# Patient Record
Sex: MALE | Race: WHITE | NOT HISPANIC OR LATINO | Employment: OTHER | URBAN - METROPOLITAN AREA
[De-identification: names, ages, dates, MRNs, and addresses within clinical notes are randomized per-mention and may not be internally consistent; named-entity substitution may affect disease eponyms.]

---

## 2017-07-05 ENCOUNTER — TRANSCRIBE ORDERS (OUTPATIENT)
Dept: ADMINISTRATIVE | Facility: HOSPITAL | Age: 82
End: 2017-07-05

## 2017-07-05 DIAGNOSIS — R53.1 WEAKNESS: Primary | ICD-10-CM

## 2017-07-13 ENCOUNTER — HOSPITAL ENCOUNTER (OUTPATIENT)
Dept: MRI IMAGING | Facility: HOSPITAL | Age: 82
Discharge: HOME | End: 2017-07-13
Attending: INTERNAL MEDICINE
Payer: COMMERCIAL

## 2017-07-13 DIAGNOSIS — R53.1 WEAKNESS: ICD-10-CM

## 2017-07-13 PROCEDURE — 70551 MRI BRAIN STEM W/O DYE: CPT

## 2018-09-24 ENCOUNTER — HOSPITAL ENCOUNTER (EMERGENCY)
Facility: HOSPITAL | Age: 83
Discharge: HOME | End: 2018-09-24
Attending: EMERGENCY MEDICINE | Admitting: EMERGENCY MEDICINE
Payer: COMMERCIAL

## 2018-09-24 VITALS
DIASTOLIC BLOOD PRESSURE: 91 MMHG | SYSTOLIC BLOOD PRESSURE: 169 MMHG | RESPIRATION RATE: 18 BRPM | HEART RATE: 73 BPM | TEMPERATURE: 97.7 F | WEIGHT: 210.87 LBS | OXYGEN SATURATION: 97 %

## 2018-09-24 DIAGNOSIS — R33.9 URINARY RETENTION: Primary | ICD-10-CM

## 2018-09-24 LAB
ANION GAP SERPL CALCULATED.3IONS-SCNC: 7 MMOL/L (ref 4–13)
BASOPHILS # BLD AUTO: 0.04 THOUSANDS/ΜL (ref 0–0.1)
BASOPHILS NFR BLD AUTO: 1 % (ref 0–1)
BILIRUB UR QL STRIP: NEGATIVE
BUN SERPL-MCNC: 22 MG/DL (ref 5–25)
CALCIUM SERPL-MCNC: 8.1 MG/DL (ref 8.3–10.1)
CHLORIDE SERPL-SCNC: 106 MMOL/L (ref 100–108)
CLARITY UR: CLEAR
CO2 SERPL-SCNC: 26 MMOL/L (ref 21–32)
COLOR UR: YELLOW
CREAT SERPL-MCNC: 1.26 MG/DL (ref 0.6–1.3)
EOSINOPHIL # BLD AUTO: 0.07 THOUSAND/ΜL (ref 0–0.61)
EOSINOPHIL NFR BLD AUTO: 2 % (ref 0–6)
ERYTHROCYTE [DISTWIDTH] IN BLOOD BY AUTOMATED COUNT: 12.5 % (ref 11.6–15.1)
GFR SERPL CREATININE-BSD FRML MDRD: 49 ML/MIN/1.73SQ M
GLUCOSE SERPL-MCNC: 96 MG/DL (ref 65–140)
GLUCOSE UR STRIP-MCNC: NEGATIVE MG/DL
HCT VFR BLD AUTO: 41.8 % (ref 36.5–49.3)
HGB BLD-MCNC: 14.1 G/DL (ref 12–17)
HGB UR QL STRIP.AUTO: NEGATIVE
IMM GRANULOCYTES # BLD AUTO: 0.02 THOUSAND/UL (ref 0–0.2)
IMM GRANULOCYTES NFR BLD AUTO: 0 % (ref 0–2)
KETONES UR STRIP-MCNC: NEGATIVE MG/DL
LEUKOCYTE ESTERASE UR QL STRIP: NEGATIVE
LYMPHOCYTES # BLD AUTO: 1.2 THOUSANDS/ΜL (ref 0.6–4.47)
LYMPHOCYTES NFR BLD AUTO: 27 % (ref 14–44)
MCH RBC QN AUTO: 30.6 PG (ref 26.8–34.3)
MCHC RBC AUTO-ENTMCNC: 33.7 G/DL (ref 31.4–37.4)
MCV RBC AUTO: 91 FL (ref 82–98)
MONOCYTES # BLD AUTO: 0.41 THOUSAND/ΜL (ref 0.17–1.22)
MONOCYTES NFR BLD AUTO: 9 % (ref 4–12)
NEUTROPHILS # BLD AUTO: 2.79 THOUSANDS/ΜL (ref 1.85–7.62)
NEUTS SEG NFR BLD AUTO: 61 % (ref 43–75)
NITRITE UR QL STRIP: NEGATIVE
NRBC BLD AUTO-RTO: 0 /100 WBCS
PH UR STRIP.AUTO: 5.5 [PH] (ref 4.5–8)
PLATELET # BLD AUTO: 148 THOUSANDS/UL (ref 149–390)
PMV BLD AUTO: 11.3 FL (ref 8.9–12.7)
POTASSIUM SERPL-SCNC: 3.9 MMOL/L (ref 3.5–5.3)
PROT UR STRIP-MCNC: NEGATIVE MG/DL
RBC # BLD AUTO: 4.61 MILLION/UL (ref 3.88–5.62)
SODIUM SERPL-SCNC: 139 MMOL/L (ref 136–145)
SP GR UR STRIP.AUTO: 1.01 (ref 1–1.03)
UROBILINOGEN UR QL STRIP.AUTO: 0.2 E.U./DL
WBC # BLD AUTO: 4.53 THOUSAND/UL (ref 4.31–10.16)

## 2018-09-24 PROCEDURE — 36415 COLL VENOUS BLD VENIPUNCTURE: CPT | Performed by: EMERGENCY MEDICINE

## 2018-09-24 PROCEDURE — 80048 BASIC METABOLIC PNL TOTAL CA: CPT | Performed by: EMERGENCY MEDICINE

## 2018-09-24 PROCEDURE — 81003 URINALYSIS AUTO W/O SCOPE: CPT

## 2018-09-24 PROCEDURE — 99283 EMERGENCY DEPT VISIT LOW MDM: CPT

## 2018-09-24 PROCEDURE — 85025 COMPLETE CBC W/AUTO DIFF WBC: CPT | Performed by: EMERGENCY MEDICINE

## 2018-09-24 RX ORDER — TAMSULOSIN HYDROCHLORIDE 0.4 MG/1
0.4 CAPSULE ORAL
COMMUNITY
Start: 2017-10-12

## 2018-09-24 NOTE — ED PROVIDER NOTES
History  Chief Complaint   Patient presents with    Urinary Retention     pt here with c/o urinary retention and pain since this morning  hx of blockage     77-year-old male brought in for evaluation with chief complaint of inability to urinate  Onset was this morning  Patient has significant urgency and suprapubic abdominal pain  Patient has tried to urinate but is only able to pass a few drops of urine  No fevers or chills  No dysuria  No hematuria  Patient has a history of similar symptoms in the past requiring a Ruelas catheter  According to family members however this was a traumatic experience for him and several times he accidentally dislodged the catheter himself  History provided by:  Patient and relative   used: No    Difficulty Urinating   Presenting symptoms: no dysuria    Presenting symptoms comment:  Urinary retention    Relieved by:  Nothing  Worsened by:  Nothing  Ineffective treatments:  None tried  Associated symptoms: abdominal pain (suprapubic) and urinary retention    Associated symptoms: no diarrhea, no fever, no hematuria, no nausea, no urinary frequency, no urinary incontinence and no vomiting    Abdominal pain:     Location:  Suprapubic    Quality: aching and pressure      Severity:  Moderate    Onset quality:  Sudden    Duration:  1 day    Timing:  Constant    Progression:  Worsening    Chronicity:  New  Risk factors: no change in medication        Prior to Admission Medications   Prescriptions Last Dose Informant Patient Reported? Taking?   tamsulosin (FLOMAX) 0 4 mg   Yes Yes   Sig: Take 0 8 mg by mouth      Facility-Administered Medications: None       Past Medical History:   Diagnosis Date    Gall stone     Hypotension     Urinary retention        History reviewed  No pertinent surgical history  History reviewed  No pertinent family history  I have reviewed and agree with the history as documented      Social History   Substance Use Topics    Smoking status: Never Smoker    Smokeless tobacco: Never Used    Alcohol use No        Review of Systems   Constitutional: Negative for chills, diaphoresis and fever  Respiratory: Negative for shortness of breath  Cardiovascular: Negative for chest pain and palpitations  Gastrointestinal: Positive for abdominal pain (suprapubic)  Negative for diarrhea, nausea and vomiting  Genitourinary: Positive for decreased urine volume, difficulty urinating and urgency  Negative for bladder incontinence, dysuria, frequency and hematuria  Skin: Negative for rash  All other systems reviewed and are negative  Physical Exam  Physical Exam   Constitutional: He is oriented to person, place, and time  He appears well-developed and well-nourished  He appears distressed  HENT:   Head: Normocephalic and atraumatic  Eyes: EOM are normal  Pupils are equal, round, and reactive to light  Neck: Normal range of motion  No JVD present  Cardiovascular: Normal rate, regular rhythm, normal heart sounds and intact distal pulses  Exam reveals no gallop and no friction rub  No murmur heard  Pulmonary/Chest: Effort normal and breath sounds normal  No respiratory distress  He has no wheezes  He has no rales  He exhibits no tenderness  Abdominal: There is tenderness (suprapubic tenderness over bladder)  Musculoskeletal: Normal range of motion  He exhibits no tenderness  Neurological: He is alert and oriented to person, place, and time  Hard of hearing     Skin: Skin is warm and dry  Psychiatric: He has a normal mood and affect  His behavior is normal  Judgment and thought content normal    Nursing note and vitals reviewed        Vital Signs  ED Triage Vitals [09/24/18 0748]   Temperature Pulse Respirations Blood Pressure SpO2   97 7 °F (36 5 °C) 73 18 169/91 97 %      Temp Source Heart Rate Source Patient Position - Orthostatic VS BP Location FiO2 (%)   Oral Monitor Sitting Right arm --      Pain Score       -- Vitals:    09/24/18 0748   BP: 169/91   Pulse: 73   Patient Position - Orthostatic VS: Sitting       Visual Acuity      ED Medications  Medications - No data to display    Diagnostic Studies  Results Reviewed     Procedure Component Value Units Date/Time    Basic metabolic panel [[id number]]  (Abnormal) Collected:  09/24/18 0819    Lab Status:  Final result Specimen:  Blood from Arm, Right Updated:  09/24/18 0849     Sodium 139 mmol/L      Potassium 3 9 mmol/L      Chloride 106 mmol/L      CO2 26 mmol/L      ANION GAP 7 mmol/L      BUN 22 mg/dL      Creatinine 1 26 mg/dL      Glucose 96 mg/dL      Calcium 8 1 (L) mg/dL      eGFR 49 ml/min/1 73sq m     Narrative:         National Kidney Disease Education Program recommendations are as follows:  GFR calculation is accurate only with a steady state creatinine  Chronic Kidney disease less than 60 ml/min/1 73 sq  meters  Kidney failure less than 15 ml/min/1 73 sq  meters      CBC and differential [[id number]]  (Abnormal) Collected:  09/24/18 0819    Lab Status:  Final result Specimen:  Blood from Arm, Right Updated:  09/24/18 0833     WBC 4 53 Thousand/uL      RBC 4 61 Million/uL      Hemoglobin 14 1 g/dL      Hematocrit 41 8 %      MCV 91 fL      MCH 30 6 pg      MCHC 33 7 g/dL      RDW 12 5 %      MPV 11 3 fL      Platelets 556 (L) Thousands/uL      nRBC 0 /100 WBCs      Neutrophils Relative 61 %      Immat GRANS % 0 %      Lymphocytes Relative 27 %      Monocytes Relative 9 %      Eosinophils Relative 2 %      Basophils Relative 1 %      Neutrophils Absolute 2 79 Thousands/µL      Immature Grans Absolute 0 02 Thousand/uL      Lymphocytes Absolute 1 20 Thousands/µL      Monocytes Absolute 0 41 Thousand/µL      Eosinophils Absolute 0 07 Thousand/µL      Basophils Absolute 0 04 Thousands/µL     ED Urine Macroscopic [[id number]] Collected:  09/24/18 0838    Lab Status:  Final result Specimen:  Urine Updated:  09/24/18 0826     Color, UA Yellow     Clarity, UA Clear pH, UA 5 5     Leukocytes, UA Negative     Nitrite, UA Negative     Protein, UA Negative mg/dl      Glucose, UA Negative mg/dl      Ketones, UA Negative mg/dl      Urobilinogen, UA 0 2 E U /dl      Bilirubin, UA Negative     Blood, UA Negative     Specific Gravity, UA 1 015    Narrative:       CLIN[facility] RESULT                 No orders to display              Procedures  Procedures       Phone Contacts  ED Phone Contact    ED Course                               MDM  Number of Diagnoses or Management Options  Urinary retention: new and requires workup  Diagnosis management comments: Background:89 y o  male patient presents with s/s of urinary retention    Differential: prostate disease, urinary tract infection, medication side effects, doubt neoplasm    Plan: bladder scan, urinalysis, bmp - urology referral           Amount and/or Complexity of Data Reviewed  Clinical lab tests: ordered and reviewed    Risk of Complications, Morbidity, and/or Mortality  Diagnostic procedures: high  Management options: high      CritCare Time    Disposition  Final diagnoses:   Urinary retention - acute     Time reflects when diagnosis was documented in both MDM as applicable and the Disposition within this note     Time User Action Codes Description Comment    9/24/2018  8:50 AM Farheen ZUÑIGA Add [R33 9] Urinary retention     9/24/2018  8:50 AM Farheen ZUÑIGA Modify [R33 9] Urinary retention acute      ED Disposition     ED Disposition Condition Comment    Discharge  [address] discharge to home/self care      Condition at discharge: Good        Follow-up Information     Follow up With Specialties Details Why [facility]  [address]  For [facility] Schedule an appointment as soon as possible for a visit in 2 days  [phone]  [phone]          Patient's Medications   Discharge Prescriptions    No medications on file     No discharge procedures on file     ED Provider  Electronically Signed by           Samuel Anne MD  09/24/18 1450

## 2018-09-24 NOTE — DISCHARGE INSTRUCTIONS
Urinary Retention in Men   WHAT YOU NEED TO KNOW:   Urinary retention is a condition that develops when your bladder does not empty completely when you urinate  DISCHARGE INSTRUCTIONS:   Medicines:   · Medicines  can help decrease the size of your prostate, fight infection, and help you urinate more easily  · Take your medicine as directed  Contact your healthcare provider if you think your medicine is not helping or if you have side effects  Tell him or her if you are allergic to any medicine  Keep a list of the medicines, vitamins, and herbs you take  Include the amounts, and when and why you take them  Bring the list or the pill bottles to follow-up visits  Carry your medicine list with you in case of an emergency  Ruelas catheter care: You may need a Ruelas catheter for up to 2 weeks at home  Healthcare providers will give you a smaller leg bag to collect urine  Keep the bag below your waist  This will prevent urine from flowing back into your bladder and causing an infection or other problems  Also, keep the tube free of kinks so the urine will drain properly  Do not pull on the catheter  This can cause pain and bleeding, and may cause the catheter to come out  Ask your healthcare provider or urologist for more information on Ruelas catheter care  Urinate regularly:  When your catheter is removed, do not let your bladder become too full before you urinate  Set regular times each day to urinate  Urinate as soon as you feel the need or at least every 3 hours while you are awake  Do not drink liquids before you go to bed  Urinate right before you go to bed  Follow up with your healthcare provider or urologist as directed:  Write down your questions so you remember to ask them during your visits  Contact your healthcare provider or urologist if:   · You have a fever  · You have pain when you urinate  · You have blood in your urine  · You have problems with your catheter      · You have questions or concerns about your condition or care  Return to the emergency department if:   · You have severe abdominal pain  · You are breathing faster than usual     · Your heartbeat is faster than usual     · Your face, hands, feet, or ankles are swollen  © 2017 [employer] Information is for End User's use only and may not be sold, redistributed or otherwise used for commercial purposes  All illustrations and images included in CareNotes® are the copyrighted property of [employer]  or [employer] Singh  The above information is an  only  It is not intended as medical advice for individual conditions or treatments  Talk to your doctor, nurse or pharmacist before following any medical regimen to see if it is safe and effective for you  Ruelas Catheter Placement and Care   WHAT YOU NEED TO KNOW:   A Ruelas catheter is a sterile tube that is inserted into your bladder to drain urine  It is also called an indwelling urinary catheter  The tip of the catheter has a small balloon filled with solution that holds the catheter inside your bladder  DISCHARGE INSTRUCTIONS:   Return to the emergency department if:   · Your catheter comes out  · You suddenly have material that looks like sand in the tubing or drainage bag  · No urine is draining into the bag and you have checked the system  · You have pain in your hip, back, pelvis, or lower abdomen  · You are confused or cannot think clearly  Contact your healthcare provider if:   · You have a fever  · You have bladder spasms for more than 1 day after the catheter is placed  · You see blood in the tubing or drainage bag  · You have a rash or itching where the catheter tube is secured to your skin  · Urine leaks from or around the catheter, tubing, or drainage bag  · The closed drainage system has accidently come open or apart  · You see a layer of crystals inside the tubing      · You have questions or concerns about your condition or care  Care for your Ruelas catheter:   · Clean your genital area 2 times every day  Clean your catheter and the area around where it was inserted  Use soap and water  Clean your anal opening and catheter area after every bowel movement  · Secure the catheter tube  so you do not pull or move the catheter  This helps prevent pain and bladder spasms  Healthcare providers will show you how to use medical tape or a strap to secure the catheter tube to your body  · Keep a closed drainage system  Your Ruelas catheter should always be attached to the drainage bag to form a closed system  Do not disconnect any part of the closed system unless you need to change the bag  Care for your drainage bag:   · Ask if a leg bag is right for you  A leg bag can be worn under your clothes  Ask your healthcare provider for more information about a leg bag  · Keep the drainage bag below the level of your waist   This helps stop urine from moving back up the tubing and into your bladder  Do not loop or kink the tubing  This can cause urine to back up and collect in your bladder  Do not let the drainage bag touch or lie on the floor  · Empty the drainage bag when needed  The weight of a full drainage bag can be painful  Empty the drainage bag every 3 to 6 hours or when it is ? full  · Clean and change the drainage bag as directed  Ask your healthcare provider how often you should change the drainage bag and what cleaning solution to use  Wear disposable gloves when you change the bag  Do not allow the end of the catheter or tubing to touch anything  Clean the ends with an alcohol pad before you reconnect them  What to do if problems develop:   · No urine is draining into the bag:      ¨ Check for kinks in the tubing and straighten them out  ¨ Check the tape or strap used to secure the catheter tube to your skin  Make sure it is not blocking the tube       ¨ Make sure you are not sitting or lying on the tubing  ¨ Make sure the urine bag is hanging below the level of your waist     · Urine leaks from or around the catheter, tubing, or drainage bag:  Check if the closed drainage system has accidently come open or apart  Clean the catheter and tubing ends with a new alcohol pad and reconnect them  Prevent an infection:   · Wash your hands often  Wash before and after you touch your catheter, tubing, or drainage bag  Use soap and water  Wear clean disposable gloves when you care for your catheter or disconnect the drainage bag  Wash your hands before you prepare or eat food  · Drink liquids as directed  Ask your healthcare provider how much liquid to drink each day and which liquids are best for you  Liquids will help flush your kidneys and bladder to help prevent infection  Follow up with your healthcare provider as directed:  Write down your questions so you remember to ask them during your visits  © 2017 [employer] Information is for End User's use only and may not be sold, redistributed or otherwise used for commercial purposes  All illustrations and images included in CareNotes® are the copyrighted property of [employer]  or Nuno Singh  The above information is an  only  It is not intended as medical advice for individual conditions or treatments  Talk to your doctor, nurse or pharmacist before following any medical regimen to see if it is safe and effective for you

## 2018-11-13 ENCOUNTER — HOSPITAL ENCOUNTER (EMERGENCY)
Facility: HOSPITAL | Age: 83
Discharge: HOME | End: 2018-11-13
Attending: EMERGENCY MEDICINE | Admitting: EMERGENCY MEDICINE
Payer: COMMERCIAL

## 2018-11-13 VITALS
TEMPERATURE: 98.1 F | DIASTOLIC BLOOD PRESSURE: 59 MMHG | WEIGHT: 215.17 LBS | SYSTOLIC BLOOD PRESSURE: 121 MMHG | RESPIRATION RATE: 18 BRPM | HEART RATE: 66 BPM | OXYGEN SATURATION: 98 %

## 2018-11-13 DIAGNOSIS — N17.9 AKI (ACUTE KIDNEY INJURY) (HCC): ICD-10-CM

## 2018-11-13 DIAGNOSIS — E87.5 ACUTE HYPERKALEMIA: ICD-10-CM

## 2018-11-13 DIAGNOSIS — R33.8 ACUTE URINARY RETENTION: Primary | ICD-10-CM

## 2018-11-13 LAB
ANION GAP SERPL CALCULATED.3IONS-SCNC: 11 MMOL/L (ref 4–13)
BASOPHILS # BLD AUTO: 0.01 THOUSANDS/ΜL (ref 0–0.1)
BASOPHILS NFR BLD AUTO: 0 % (ref 0–1)
BILIRUB UR QL STRIP: NEGATIVE
BUN SERPL-MCNC: 24 MG/DL (ref 5–25)
CALCIUM SERPL-MCNC: 8.4 MG/DL (ref 8.3–10.1)
CHLORIDE SERPL-SCNC: 104 MMOL/L (ref 100–108)
CLARITY UR: CLEAR
CO2 SERPL-SCNC: 24 MMOL/L (ref 21–32)
COLOR UR: YELLOW
CREAT SERPL-MCNC: 1.42 MG/DL (ref 0.6–1.3)
EOSINOPHIL # BLD AUTO: 0 THOUSAND/ΜL (ref 0–0.61)
EOSINOPHIL NFR BLD AUTO: 0 % (ref 0–6)
ERYTHROCYTE [DISTWIDTH] IN BLOOD BY AUTOMATED COUNT: 12.8 % (ref 11.6–15.1)
GFR SERPL CREATININE-BSD FRML MDRD: 43 ML/MIN/1.73SQ M
GLUCOSE SERPL-MCNC: 112 MG/DL (ref 65–140)
GLUCOSE UR STRIP-MCNC: NEGATIVE MG/DL
HCT VFR BLD AUTO: 40.3 % (ref 36.5–49.3)
HGB BLD-MCNC: 13.6 G/DL (ref 12–17)
HGB UR QL STRIP.AUTO: NEGATIVE
IMM GRANULOCYTES # BLD AUTO: 0.05 THOUSAND/UL (ref 0–0.2)
IMM GRANULOCYTES NFR BLD AUTO: 1 % (ref 0–2)
KETONES UR STRIP-MCNC: NEGATIVE MG/DL
LEUKOCYTE ESTERASE UR QL STRIP: NEGATIVE
LYMPHOCYTES # BLD AUTO: 1 THOUSANDS/ΜL (ref 0.6–4.47)
LYMPHOCYTES NFR BLD AUTO: 11 % (ref 14–44)
MCH RBC QN AUTO: 31.2 PG (ref 26.8–34.3)
MCHC RBC AUTO-ENTMCNC: 33.7 G/DL (ref 31.4–37.4)
MCV RBC AUTO: 92 FL (ref 82–98)
MONOCYTES # BLD AUTO: 0.58 THOUSAND/ΜL (ref 0.17–1.22)
MONOCYTES NFR BLD AUTO: 6 % (ref 4–12)
NEUTROPHILS # BLD AUTO: 7.51 THOUSANDS/ΜL (ref 1.85–7.62)
NEUTS SEG NFR BLD AUTO: 82 % (ref 43–75)
NITRITE UR QL STRIP: NEGATIVE
NRBC BLD AUTO-RTO: 0 /100 WBCS
PH UR STRIP.AUTO: 5.5 [PH] (ref 4.5–8)
PLATELET # BLD AUTO: 154 THOUSANDS/UL (ref 149–390)
PMV BLD AUTO: 11.2 FL (ref 8.9–12.7)
POTASSIUM SERPL-SCNC: 5.5 MMOL/L (ref 3.5–5.3)
PROT UR STRIP-MCNC: NEGATIVE MG/DL
RBC # BLD AUTO: 4.36 MILLION/UL (ref 3.88–5.62)
SODIUM SERPL-SCNC: 139 MMOL/L (ref 136–145)
SP GR UR STRIP.AUTO: 1.01 (ref 1–1.03)
UROBILINOGEN UR QL STRIP.AUTO: 0.2 E.U./DL
WBC # BLD AUTO: 9.15 THOUSAND/UL (ref 4.31–10.16)

## 2018-11-13 PROCEDURE — 85025 COMPLETE CBC W/AUTO DIFF WBC: CPT | Performed by: EMERGENCY MEDICINE

## 2018-11-13 PROCEDURE — 36415 COLL VENOUS BLD VENIPUNCTURE: CPT | Performed by: EMERGENCY MEDICINE

## 2018-11-13 PROCEDURE — 80048 BASIC METABOLIC PNL TOTAL CA: CPT | Performed by: EMERGENCY MEDICINE

## 2018-11-13 PROCEDURE — 99283 EMERGENCY DEPT VISIT LOW MDM: CPT

## 2018-11-13 PROCEDURE — 81003 URINALYSIS AUTO W/O SCOPE: CPT | Performed by: EMERGENCY MEDICINE

## 2018-11-13 RX ORDER — GABAPENTIN 600 MG/1
600 TABLET ORAL
COMMUNITY
End: 2018-11-15 | Stop reason: HOSPADM

## 2018-11-13 RX ORDER — UBIDECARENONE 75 MG
CAPSULE ORAL DAILY
COMMUNITY

## 2018-11-13 RX ORDER — HYDROCODONE BITARTRATE AND ACETAMINOPHEN 5; 300 MG/1; MG/1
1 TABLET ORAL EVERY 6 HOURS PRN
Status: ON HOLD | COMMUNITY
End: 2018-11-27

## 2018-11-13 RX ORDER — ALPRAZOLAM 0.25 MG/1
TABLET ORAL
Status: ON HOLD | COMMUNITY
End: 2018-11-27

## 2018-11-13 RX ORDER — SODIUM CHLORIDE 1000 MG
2 TABLET, SOLUBLE MISCELLANEOUS DAILY
COMMUNITY

## 2018-11-13 NOTE — DISCHARGE INSTRUCTIONS
Urinary Retention in Men   WHAT YOU NEED TO KNOW:   What is urinary retention? Urinary retention is a condition that develops when your bladder does not empty completely when you urinate  What causes urinary retention? · An enlarged prostate    · Blockages, such as a stone, growth, or narrowing of your urethra    · A weak bladder muscle    · Nerve damage from diabetes, stroke, or spinal cord injury    · Bladder diverticula, which are pockets of urine that form in your bladder and do not empty    · Certain medicines, such as narcotics, antihistamines, or antidepressants  What are the signs and symptoms of urinary retention? · Frequent urination, or the urge to urinate right after you finish    · An urge to urinate, but your urine does not come out or dribbles out slowly and weakly    · Frequent urine leaks that happen during the day or while you sleep    · Pain or pressure when you urinate    · Pain or stiffness in your abdomen, lower back, hips, or upper thighs    · Blood in your urine  How is urinary retention diagnosed? Your healthcare provider will ask about your health history and the medicines you take  He will press or tap on your lower abdomen  You may need any of the following tests:  · A digital rectal exam  is when healthcare providers carefully feel the size of your prostate  · A post void residual  test will show how much urine is left in your bladder after you urinate  You will be asked to urinate and then healthcare providers will use a small ultrasound machine to check how much urine is left in your bladder  · Blood or urine tests  may show infection or prostate specific antigen (PSA) levels  PSA may be elevated in prostate cancer  · An ultrasound  uses sound waves to show pictures on a monitor  An ultrasound may be done to show bladder stones, infection, or other problems  · A CT scan , or CAT scan, is a type of x-ray that is taken of your prostate, kidneys, and bladder   The pictures may show what is causing your urinary retention  You may be given a dye before the pictures are taken to help healthcare providers see the pictures better  Tell the healthcare provider if you have ever had an allergic reaction to contrast dye  How is urinary retention treated? · A Ruelas catheter  is a tube put into your bladder to drain urine into a bag  Keep the bag below your waist  This will prevent urine from flowing back into your bladder and causing an infection or other problems  Also, keep the tube free of kinks so the urine will drain properly  Do not pull on the catheter  This can cause pain and bleeding, and may cause the catheter to come out  · Medicines  can help decrease the size of your prostate, fight infection, and help you urinate more easily  · Surgery  may be needed to treat the condition that is causing your urinary retention  When should I contact my healthcare provider? · You have a fever  · You have pain when you urinate  · You have blood in your urine  · You have problems with your catheter  · You have questions or concerns about your condition or care  When should I seek immediate care or call 911? · You have severe abdominal pain  · You are breathing faster than usual     · Your heartbeat is faster than usual     · Your face, hands, feet, or ankles are swollen  CARE AGREEMENT:   You have the right to help plan your care  Learn about your health condition and how it may be treated  Discuss treatment options with your caregivers to decide what care you want to receive  You always have the right to refuse treatment  The above information is an  only  It is not intended as medical advice for individual conditions or treatments  Talk to your doctor, nurse or pharmacist before following any medical regimen to see if it is safe and effective for you    © 2017 [employer] Information is for End User's use only and may not be sold, redistributed or otherwise used for commercial purposes  All illustrations and images included in CareNotes® are the copyrighted property of [employer]  or Nuno Singh

## 2018-11-13 NOTE — ED PROVIDER NOTES
History  Chief Complaint   Patient presents with    Difficulty Urinating     Pt has procedure today at [facility] for throat biopsy  Per wife, pt was reporting unable to urinate this evening  Pt denies pain  15-year-old male presents with difficulty urinating , patient with history of urinary retention due to enlarged prostate  Has not able to fully empty his bladder past day  Only urinating small amounts, frequently throughout the day  Increased urgency increased frequency and burning with urination  Patient describes pain as burning type pain only occurs with urinating but does have a suprapubic fullness  Worse with palpation suprapubically better when left alone or standing  No fevers no chills eating well drinking well  Wife states he has had multiple traumatic experiences with Ruelas's in the past and she absolutely does not want a Ruelas pretty much under any circumstance she states the past we have been able to drain his bladder, he has been able to be discharged and he has been able to void on his own  She agrees to return if he is unable to        History provided by:  Patient and spouse  History limited by:  Dementia      Prior to Admission Medications   Prescriptions Last Dose Informant Patient Reported? Taking?    ALPRAZolam (XANAX) 0 25 mg tablet   Yes Yes   Sig: Take by mouth daily at bedtime as needed for anxiety   HYDROcodone-acetaminophen (VICODIN) 5-300 MG per tablet   Yes Yes   Sig: Take 1 tablet by mouth every 6 (six) hours as needed for moderate pain   cyanocobalamin (VITAMIN B-12) 100 mcg tablet   Yes Yes   Sig: Take by mouth daily   gabapentin (NEURONTIN) 600 MG tablet   Yes Yes   Sig: Take 300 mg by mouth 3 (three) times a day   sodium chloride 1 g tablet   Yes Yes   Sig: Take 2 g by mouth daily   tamsulosin (FLOMAX) 0 4 mg   Yes Yes   Sig: Take 0 4 mg by mouth        Facility-Administered Medications: None       Past Medical History:   Diagnosis Date    Gall stone     Hypotension  Thro[facility] cancer ([facility] [address] )     Urinary retention        History reviewed  No pertinent surgical history  History reviewed  No pertinent family history  I have reviewed and agree with the history as documented  Social History   Substance Use Topics    Smoking status: Never Smoker    Smokeless tobacco: Never Used    Alcohol use No        Review of Systems   Constitutional: Negative for activity change, chills, diaphoresis and fever  HENT: Negative for congestion, sinus pressure and sore throat  Eyes: Negative for pain and visual disturbance  Respiratory: Negative for cough, chest tightness, shortness of breath, wheezing and stridor  Cardiovascular: Negative for chest pain and palpitations  Gastrointestinal: Negative for abdominal distention, abdominal pain, constipation, diarrhea, nausea and vomiting  Genitourinary: Negative for dysuria and frequency  Musculoskeletal: Negative for neck pain and neck stiffness  Skin: Negative for rash  Neurological: Negative for dizziness, speech difficulty, light-headedness, numbness and headaches  Physical Exam  Physical Exam   Constitutional: He is oriented to person, place, and time  He appears well-developed  No distress  HENT:   Head: Normocephalic and atraumatic  Eyes: Pupils are equal, round, and reactive to light  Neck: Normal range of motion  Neck supple  No tracheal deviation present  Cardiovascular: Normal rate, regular rhythm, normal heart sounds and intact distal pulses  No murmur heard  Pulmonary/Chest: Effort normal and breath sounds normal  No stridor  No respiratory distress  Abdominal: Soft  He exhibits no distension  There is tenderness ( Suprapubic over distended palpable bladder)  There is no rebound and no guarding  Musculoskeletal: Normal range of motion  Neurological: He is alert and oriented to person, place, and time  Skin: Skin is warm and dry  He is not diaphoretic  No erythema  No pallor     Psychiatric: He has a normal mood and affect  Vitals reviewed  Vital Signs  ED Triage Vitals   Temperature Pulse Respirations Blood Pressure SpO2   11/13/18 0213 11/13/18 0133 11/13/18 0133 11/13/18 0133 11/13/18 0133   98 1 °F (36 7 °C) 66 18 121/59 98 %      Temp Source Heart Rate Source Patient Position - Orthostatic VS BP Location FiO2 (%)   11/13/18 0213 11/13/18 0133 -- -- --   Oral Monitor         Pain Score       11/13/18 0133       No Pain           Vitals:    11/13/18 0133   BP: 121/59   Pulse: 66       Visual Acuity      ED Medications  Medications - No data to display    Diagnostic Studies  Results Reviewed     Procedure Component Value Units Date/Time    UA w Reflex to Microscopic w Reflex to Culture [[id number]] Collected:  11/13/18 0208    Lab Status:  Final result Specimen:  Urine from Urine, Straight Cath Updated:  11/13/18 0215     Color, UA Yellow     Clarity, UA Clear     Specific Gravity, UA 1 015     pH, UA 5 5     Leukocytes, UA Negative     Nitrite, UA Negative     Protein, UA Negative mg/dl      Glucose, UA Negative mg/dl      Ketones, UA Negative mg/dl      Urobilinogen, UA 0 2 E U /dl      Bilirubin, UA Negative     Blood, UA Negative    Basic metabolic panel [[id number]]  (Abnormal) Collected:  11/13/18 0148    Lab Status:  Final result Specimen:  Blood from Arm, Right Updated:  11/13/18 0206     Sodium 139 mmol/L      Potassium 5 5 (H) mmol/L      Chloride 104 mmol/L      CO2 24 mmol/L      ANION GAP 11 mmol/L      BUN 24 mg/dL      Creatinine 1 42 (H) mg/dL      Glucose 112 mg/dL      Calcium 8 4 mg/dL      eGFR 43 ml/min/1 73sq m     Narrative:         National Kidney Disease Education Program recommendations are as follows:  GFR calculation is accurate only with a steady state creatinine  Chronic Kidney disease less than 60 ml/min/1 73 sq  meters  Kidney failure less than 15 ml/min/1 73 sq  meters      CBC and differential [[id number]]  (Abnormal) Collected:  11/13/18 0148    Lab Status:  Final result Specimen:  Blood from Arm, Right Updated:  11/13/18 0154     WBC 9 15 Thousand/uL      RBC 4 36 Million/uL      Hemoglobin 13 6 g/dL      Hematocrit 40 3 %      MCV 92 fL      MCH 31 2 pg      MCHC 33 7 g/dL      RDW 12 8 %      MPV 11 2 fL      Platelets 950 Thousands/uL      nRBC 0 /100 WBCs      Neutrophils Relative 82 (H) %      Immat GRANS % 1 %      Lymphocytes Relative 11 (L) %      Monocytes Relative 6 %      Eosinophils Relative 0 %      Basophils Relative 0 %      Neutrophils Absolute 7 51 Thousands/µL      Immature Grans Absolute 0 05 Thousand/uL      Lymphocytes Absolute 1 00 Thousands/µL      Monocytes Absolute 0 58 Thousand/µL      Eosinophils Absolute 0 00 Thousand/µL      Basophils Absolute 0 01 Thousands/µL                  No orders to display              Procedures  Procedures       Phone Contacts  ED Phone Contact    ED Course                               MDM  Number of Diagnoses or Management Options  Acute hyperkalemia: new and requires workup  Acute urinary retention: new and requires workup  LEXIE (acute kidney injury) [facility]): new and requires workup  Diagnosis management comments:       Initial ED assessment:  41-year-old male presents in acute urinary retention    Initial DDx includes but is not limited to:   Secondary to enlarged prostate, less likely urinary tract infection  As this is been for a day will check for signs of renal failure    Initial ED plan:   Blood work, urinalysis, recommended Ruelas catheter placement as this is recurrent wife refusing, will perform straight cath instead        Final ED summary/disposition:   After evaluation and workup in the emergency department, discussed results with patient's wife, she does not want him admitted as he has required chemical and physical restraints in the past, she does not want a Ruelas catheter as when he gets this in the past he attempts to pull out , this is all due to his dementia  , she understands risks benefits and alternatives is requesting to take him home and agrees to return for any recurrence of symptoms       Amount and/or Complexity of Data Reviewed  Clinical lab tests: ordered and reviewed  Decide to obtain previous medical records or to obtain history from someone other than the patient: yes  Obtain history from someone other than the patient: yes  Review and summarize past medical records: yes      CritCare Time    Disposition  Final diagnoses:   Acute urinary retention   LEX[facility] ([facility] kidney [facility]) ([facility] )   Acute hyperkalemia     Time reflects when diagnosis was documented in both MDM as applicable and the Disposition within this note     Time User Action Codes Description Comment    11/13/2018  2:18 AM Perri Cheung Add [R33 8] Acute urinary retention     11/13/2018  2:23 AM Irvin Bailon Add [N17 9] LEXIE (acute kidney injury) ([facility] )     11/13/2018  2:23 AM Perri Cheung Add [E87 5] Acute hyperkalemia       ED Disposition     ED Disposition Condition Comment    Discharge  [address] discharge to home/self care  Condition at discharge: Good        Follow-up Information     Follow up With Specialties Details Why Contact Info Additional [address] Emergency Department Emergency Medicine Go to If you still or having difficulty urinating as you will require a Ruelas catheter [address]  [phone] [facility] ED, [address], [place], [place], [address]          Patient's Medications   Discharge Prescriptions    No medications on file     No discharge procedures on file      ED Provider  Electronically Signed by           Dutch Hamilton DO  11/13/18 1838

## 2018-11-14 ENCOUNTER — HOSPITAL ENCOUNTER (OUTPATIENT)
Facility: HOSPITAL | Age: 83
Setting detail: OBSERVATION
Discharge: HOME HEALTH | End: 2018-11-15
Attending: EMERGENCY MEDICINE | Admitting: INTERNAL MEDICINE
Payer: COMMERCIAL

## 2018-11-14 DIAGNOSIS — R33.9 URINARY RETENTION: ICD-10-CM

## 2018-11-14 DIAGNOSIS — Z85.819 HISTORY OF THROAT CANCER: ICD-10-CM

## 2018-11-14 DIAGNOSIS — R33.8 ACUTE URINARY RETENTION: Primary | ICD-10-CM

## 2018-11-14 DIAGNOSIS — F03.90 DEMENTIA (HCC): ICD-10-CM

## 2018-11-14 PROBLEM — N17.9 ACUTE KIDNEY INJURY (HCC): Status: ACTIVE | Noted: 2018-11-14

## 2018-11-14 LAB
ANION GAP SERPL CALCULATED.3IONS-SCNC: 7 MMOL/L (ref 4–13)
ANION GAP SERPL CALCULATED.3IONS-SCNC: 8 MMOL/L (ref 4–13)
BASOPHILS # BLD AUTO: 0.03 THOUSANDS/ΜL (ref 0–0.1)
BASOPHILS # BLD AUTO: 0.03 THOUSANDS/ΜL (ref 0–0.1)
BASOPHILS NFR BLD AUTO: 0 % (ref 0–1)
BASOPHILS NFR BLD AUTO: 1 % (ref 0–1)
BILIRUB UR QL STRIP: NEGATIVE
BUN SERPL-MCNC: 25 MG/DL (ref 5–25)
BUN SERPL-MCNC: 27 MG/DL (ref 5–25)
CALCIUM SERPL-MCNC: 8 MG/DL (ref 8.3–10.1)
CALCIUM SERPL-MCNC: 8.4 MG/DL (ref 8.3–10.1)
CHLORIDE SERPL-SCNC: 108 MMOL/L (ref 100–108)
CHLORIDE SERPL-SCNC: 110 MMOL/L (ref 100–108)
CLARITY UR: CLEAR
CO2 SERPL-SCNC: 26 MMOL/L (ref 21–32)
CO2 SERPL-SCNC: 27 MMOL/L (ref 21–32)
COLOR UR: YELLOW
CREAT SERPL-MCNC: 1.23 MG/DL (ref 0.6–1.3)
CREAT SERPL-MCNC: 1.43 MG/DL (ref 0.6–1.3)
EOSINOPHIL # BLD AUTO: 0.12 THOUSAND/ΜL (ref 0–0.61)
EOSINOPHIL # BLD AUTO: 0.16 THOUSAND/ΜL (ref 0–0.61)
EOSINOPHIL NFR BLD AUTO: 2 % (ref 0–6)
EOSINOPHIL NFR BLD AUTO: 2 % (ref 0–6)
ERYTHROCYTE [DISTWIDTH] IN BLOOD BY AUTOMATED COUNT: 13 % (ref 11.6–15.1)
ERYTHROCYTE [DISTWIDTH] IN BLOOD BY AUTOMATED COUNT: 13.2 % (ref 11.6–15.1)
GFR SERPL CREATININE-BSD FRML MDRD: 42 ML/MIN/1.73SQ M
GFR SERPL CREATININE-BSD FRML MDRD: 51 ML/MIN/1.73SQ M
GLUCOSE SERPL-MCNC: 87 MG/DL (ref 65–140)
GLUCOSE SERPL-MCNC: 93 MG/DL (ref 65–140)
GLUCOSE UR STRIP-MCNC: NEGATIVE MG/DL
HCT VFR BLD AUTO: 36.3 % (ref 36.5–49.3)
HCT VFR BLD AUTO: 39.7 % (ref 36.5–49.3)
HGB BLD-MCNC: 12.1 G/DL (ref 12–17)
HGB BLD-MCNC: 13.1 G/DL (ref 12–17)
HGB UR QL STRIP.AUTO: NEGATIVE
IMM GRANULOCYTES # BLD AUTO: 0.02 THOUSAND/UL (ref 0–0.2)
IMM GRANULOCYTES # BLD AUTO: 0.04 THOUSAND/UL (ref 0–0.2)
IMM GRANULOCYTES NFR BLD AUTO: 0 % (ref 0–2)
IMM GRANULOCYTES NFR BLD AUTO: 1 % (ref 0–2)
KETONES UR STRIP-MCNC: NEGATIVE MG/DL
LEUKOCYTE ESTERASE UR QL STRIP: NEGATIVE
LYMPHOCYTES # BLD AUTO: 2.11 THOUSANDS/ΜL (ref 0.6–4.47)
LYMPHOCYTES # BLD AUTO: 2.28 THOUSANDS/ΜL (ref 0.6–4.47)
LYMPHOCYTES NFR BLD AUTO: 32 % (ref 14–44)
LYMPHOCYTES NFR BLD AUTO: 34 % (ref 14–44)
MCH RBC QN AUTO: 30.8 PG (ref 26.8–34.3)
MCH RBC QN AUTO: 30.9 PG (ref 26.8–34.3)
MCHC RBC AUTO-ENTMCNC: 33 G/DL (ref 31.4–37.4)
MCHC RBC AUTO-ENTMCNC: 33.3 G/DL (ref 31.4–37.4)
MCV RBC AUTO: 93 FL (ref 82–98)
MCV RBC AUTO: 93 FL (ref 82–98)
MONOCYTES # BLD AUTO: 0.56 THOUSAND/ΜL (ref 0.17–1.22)
MONOCYTES # BLD AUTO: 0.58 THOUSAND/ΜL (ref 0.17–1.22)
MONOCYTES NFR BLD AUTO: 8 % (ref 4–12)
MONOCYTES NFR BLD AUTO: 9 % (ref 4–12)
NEUTROPHILS # BLD AUTO: 3.66 THOUSANDS/ΜL (ref 1.85–7.62)
NEUTROPHILS # BLD AUTO: 3.71 THOUSANDS/ΜL (ref 1.85–7.62)
NEUTS SEG NFR BLD AUTO: 55 % (ref 43–75)
NEUTS SEG NFR BLD AUTO: 56 % (ref 43–75)
NITRITE UR QL STRIP: NEGATIVE
NRBC BLD AUTO-RTO: 0 /100 WBCS
NRBC BLD AUTO-RTO: 0 /100 WBCS
PH UR STRIP.AUTO: 5.5 [PH] (ref 4.5–8)
PLATELET # BLD AUTO: 112 THOUSANDS/UL (ref 149–390)
PLATELET # BLD AUTO: 136 THOUSANDS/UL (ref 149–390)
PMV BLD AUTO: 11.1 FL (ref 8.9–12.7)
PMV BLD AUTO: 11.5 FL (ref 8.9–12.7)
POTASSIUM SERPL-SCNC: 4.8 MMOL/L (ref 3.5–5.3)
POTASSIUM SERPL-SCNC: 5 MMOL/L (ref 3.5–5.3)
PROT UR STRIP-MCNC: NEGATIVE MG/DL
RBC # BLD AUTO: 3.91 MILLION/UL (ref 3.88–5.62)
RBC # BLD AUTO: 4.25 MILLION/UL (ref 3.88–5.62)
SODIUM SERPL-SCNC: 143 MMOL/L (ref 136–145)
SODIUM SERPL-SCNC: 143 MMOL/L (ref 136–145)
SP GR UR STRIP.AUTO: 1.01 (ref 1–1.03)
UROBILINOGEN UR QL STRIP.AUTO: 0.2 E.U./DL
WBC # BLD AUTO: 6.61 THOUSAND/UL (ref 4.31–10.16)
WBC # BLD AUTO: 6.69 THOUSAND/UL (ref 4.31–10.16)

## 2018-11-14 PROCEDURE — 97163 PT EVAL HIGH COMPLEX 45 MIN: CPT

## 2018-11-14 PROCEDURE — G8979 MOBILITY GOAL STATUS: HCPCS

## 2018-11-14 PROCEDURE — 85025 COMPLETE CBC W/AUTO DIFF WBC: CPT | Performed by: PHYSICIAN ASSISTANT

## 2018-11-14 PROCEDURE — G8978 MOBILITY CURRENT STATUS: HCPCS

## 2018-11-14 PROCEDURE — 96360 HYDRATION IV INFUSION INIT: CPT

## 2018-11-14 PROCEDURE — 99284 EMERGENCY DEPT VISIT MOD MDM: CPT

## 2018-11-14 PROCEDURE — 80048 BASIC METABOLIC PNL TOTAL CA: CPT | Performed by: EMERGENCY MEDICINE

## 2018-11-14 PROCEDURE — 85025 COMPLETE CBC W/AUTO DIFF WBC: CPT | Performed by: EMERGENCY MEDICINE

## 2018-11-14 PROCEDURE — 99219 PR INITIAL OBSERVATION CARE/DAY 50 MINUTES: CPT | Performed by: INTERNAL MEDICINE

## 2018-11-14 PROCEDURE — 99222 1ST HOSP IP/OBS MODERATE 55: CPT | Performed by: UROLOGY

## 2018-11-14 PROCEDURE — 36415 COLL VENOUS BLD VENIPUNCTURE: CPT | Performed by: EMERGENCY MEDICINE

## 2018-11-14 PROCEDURE — 97116 GAIT TRAINING THERAPY: CPT

## 2018-11-14 PROCEDURE — 80048 BASIC METABOLIC PNL TOTAL CA: CPT | Performed by: PHYSICIAN ASSISTANT

## 2018-11-14 PROCEDURE — 81003 URINALYSIS AUTO W/O SCOPE: CPT

## 2018-11-14 RX ORDER — HYDRALAZINE HYDROCHLORIDE 20 MG/ML
10 INJECTION INTRAMUSCULAR; INTRAVENOUS EVERY 6 HOURS PRN
Status: DISCONTINUED | OUTPATIENT
Start: 2018-11-14 | End: 2018-11-15 | Stop reason: HOSPADM

## 2018-11-14 RX ORDER — SODIUM CHLORIDE 1000 MG
2 TABLET, SOLUBLE MISCELLANEOUS DAILY
Status: DISCONTINUED | OUTPATIENT
Start: 2018-11-14 | End: 2018-11-15

## 2018-11-14 RX ORDER — GABAPENTIN 300 MG/1
900 CAPSULE ORAL
Status: ON HOLD | COMMUNITY
End: 2018-11-15

## 2018-11-14 RX ORDER — TAMSULOSIN HYDROCHLORIDE 0.4 MG/1
0.4 CAPSULE ORAL
Status: DISCONTINUED | OUTPATIENT
Start: 2018-11-14 | End: 2018-11-15 | Stop reason: HOSPADM

## 2018-11-14 RX ORDER — HEPARIN SODIUM 5000 [USP'U]/ML
5000 INJECTION, SOLUTION INTRAVENOUS; SUBCUTANEOUS EVERY 8 HOURS SCHEDULED
Status: DISCONTINUED | OUTPATIENT
Start: 2018-11-14 | End: 2018-11-15 | Stop reason: HOSPADM

## 2018-11-14 RX ORDER — GABAPENTIN 300 MG/1
300 CAPSULE ORAL 3 TIMES DAILY
Status: DISCONTINUED | OUTPATIENT
Start: 2018-11-14 | End: 2018-11-15 | Stop reason: HOSPADM

## 2018-11-14 RX ORDER — ALPRAZOLAM 0.25 MG/1
0.25 TABLET ORAL 3 TIMES DAILY PRN
Status: DISCONTINUED | OUTPATIENT
Start: 2018-11-14 | End: 2018-11-15 | Stop reason: HOSPADM

## 2018-11-14 RX ORDER — UBIDECARENONE 75 MG
100 CAPSULE ORAL DAILY
Status: DISCONTINUED | OUTPATIENT
Start: 2018-11-14 | End: 2018-11-15 | Stop reason: HOSPADM

## 2018-11-14 RX ORDER — BISACODYL 10 MG
10 SUPPOSITORY, RECTAL RECTAL DAILY PRN
Status: DISCONTINUED | OUTPATIENT
Start: 2018-11-14 | End: 2018-11-15 | Stop reason: HOSPADM

## 2018-11-14 RX ORDER — SODIUM CHLORIDE 9 MG/ML
50 INJECTION, SOLUTION INTRAVENOUS CONTINUOUS
Status: DISCONTINUED | OUTPATIENT
Start: 2018-11-14 | End: 2018-11-14

## 2018-11-14 RX ORDER — FINASTERIDE 5 MG/1
5 TABLET, FILM COATED ORAL DAILY
Status: DISCONTINUED | OUTPATIENT
Start: 2018-11-14 | End: 2018-11-15 | Stop reason: HOSPADM

## 2018-11-14 RX ORDER — SENNOSIDES 8.6 MG
1 TABLET ORAL
Status: DISCONTINUED | OUTPATIENT
Start: 2018-11-14 | End: 2018-11-15 | Stop reason: HOSPADM

## 2018-11-14 RX ORDER — ACETAMINOPHEN 325 MG/1
650 TABLET ORAL EVERY 6 HOURS PRN
Status: DISCONTINUED | OUTPATIENT
Start: 2018-11-14 | End: 2018-11-15 | Stop reason: HOSPADM

## 2018-11-14 RX ADMIN — HEPARIN SODIUM 5000 UNITS: 5000 INJECTION, SOLUTION INTRAVENOUS; SUBCUTANEOUS at 21:54

## 2018-11-14 RX ADMIN — SODIUM CHLORIDE TAB 1 GM 2 G: 1 TAB at 08:42

## 2018-11-14 RX ADMIN — SODIUM CHLORIDE 1000 ML: 0.9 INJECTION, SOLUTION INTRAVENOUS at 01:45

## 2018-11-14 RX ADMIN — FINASTERIDE 5 MG: 5 TABLET, FILM COATED ORAL at 18:34

## 2018-11-14 RX ADMIN — ALPRAZOLAM 0.25 MG: 0.25 TABLET ORAL at 23:16

## 2018-11-14 RX ADMIN — HEPARIN SODIUM 5000 UNITS: 5000 INJECTION, SOLUTION INTRAVENOUS; SUBCUTANEOUS at 13:45

## 2018-11-14 RX ADMIN — GABAPENTIN 300 MG: 300 CAPSULE ORAL at 08:42

## 2018-11-14 RX ADMIN — ACETAMINOPHEN 650 MG: 325 TABLET, FILM COATED ORAL at 23:16

## 2018-11-14 RX ADMIN — VITAM B12 100 MCG: 100 TAB at 08:42

## 2018-11-14 RX ADMIN — SODIUM CHLORIDE 50 ML/HR: 0.9 INJECTION, SOLUTION INTRAVENOUS at 03:41

## 2018-11-14 RX ADMIN — TAMSULOSIN HYDROCHLORIDE 0.4 MG: 0.4 CAPSULE ORAL at 16:23

## 2018-11-14 RX ADMIN — GABAPENTIN 300 MG: 300 CAPSULE ORAL at 16:23

## 2018-11-14 RX ADMIN — SENNOSIDES 8.6 MG: 8.6 TABLET, FILM COATED ORAL at 21:53

## 2018-11-14 RX ADMIN — HYDRALAZINE HYDROCHLORIDE 10 MG: 20 INJECTION INTRAMUSCULAR; INTRAVENOUS at 22:34

## 2018-11-14 RX ADMIN — ALPRAZOLAM 0.25 MG: 0.25 TABLET ORAL at 16:23

## 2018-11-14 RX ADMIN — GABAPENTIN 300 MG: 300 CAPSULE ORAL at 21:53

## 2018-11-14 NOTE — CONSULTS
CONSULT    Patient Name: Breanna Lyons  Patient MRN: [id number]  Date of Service: 11/14/2018   Date / Time Note Created: 11/14/2018 7:52 AM   Referring Provider: Zenia Pineda DO  Provider Creating Note: LONI Fair    PCP: Biju Leslie  Attending Provider:  Zenia Pineda DO    Reason for Consult: Urinary Retention    HPI --Mr Marlene Cullen is a 72-year-old male with dementia and BPH with obstruction formally known to [facility] for multiple episodes of retention with catheterization not seen since 2017  Patient is status post procedure for scrotal cancer and presented to emergency room postoperative with urinary retention, straight catheterized and discharged home without Ruelas catheter per family request dot patient with retention exceeding 800 mL in Ruelas catheter was inserted patient is on alpha blockade of note, spouse reports patient has difficulty maintain catheter without risk for self-induced trauma secondary to mental status and is concerned over patient's safety  Since catheterization, renal function has normalized from 1 43 on presentation to 1 2  Wife has expressed diversion to Ruelas catheter secondary to above; as well as, prior catheter associated UTI  Urologic consultation is requested against patient's significant  background for further management recommendations  Source:chart review and the patient       Patient Active Problem List   Diagnosis    Dementia    Urinary retention    Acute kidney [facility] ([facility] [address] )    History of throat cancer         Impressions  BPH with obstruction--long-standing  Patient is known to [facility] for multiple episodes of urinary retention and gross hematuria  Urinary Retention--secondary to previous  Complicated by recent debilitation/declining functional status, recent anesthesia administration and constipation  Recommendations  Maintain Ruelas catheter to straight drainage  Do not remove    Continue alpha blockade through discharge  Spouse reports recent history of constipation  Prescribed aggressive bowel regimen  Will re-attempt void trial tomorrow, after aggressive physical therapy and bowel regimen today  Discussed at length with spouse, patient has multiple competing risks  However, if patient can void even passively tomorrow in quantity sufficient amounts, would prefer to keep patient catheter free for the following reasons:  · Catheterization will not reduce patient's risk for UTI  · Catheterization according to the literature, contributes to medical frailty in patients of advanced age; as well as, reduction quality of life  · Patient has well-established history of [facility] for gross hematuria secondary to self-induced Ruelas trauma  (5 events per history)    It is in patient's best interest to refrain from unnecessary catheterization  If Mr Sonal Fajardo is not chronically infected, does not suffer from renal impairment or pain, recommend against device re-insertion near future  Nevertheless, patient may have low threshold for urinary retention and will have episodes requiring short-term catheterization or intermittent catheterization  The latter also reviewed with spouse  At this time, spouse nor daughter are interested in learning CIC, a healthy alternative  Mr Rox Guzmán lacks both the cognitive ability and physical dexterity to self perform  Will follow void trial all complete tomorrow  Past Medical History:   Diagnosis Date    Gall stone     Hypotension     Throat cancer ([facility] [address] )     Urinary retention        History reviewed  No pertinent surgical history  History reviewed  No pertinent family history  Social History     Social History    Marital status: Registered Domestic Partner     Spouse name: N/A    Number of children: N/A    Years of education: N/A     Occupational History    Not on file       Social History Main Topics    Smoking status: Never Smoker    Smokeless tobacco: Never Used    Alcohol use No    Drug use: No    Sexual activity: Not on file     Other Topics Concern    Not on file     Social History Narrative    No narrative on file       No Known Allergies    Review of Systems  10 point review of systems negative except as noted in HPI     Chart Review   Allergies, current medications, history, problem list    Vital Signs & Physical Exam  General appearance: fatigued  Head: Normocephalic, without obvious abnormality, atraumatic  Neck: no adenopathy, no carotid bruit, no JVD, supple, symmetrical, trachea midline and thyroid not enlarged, symmetric, no tenderness/mass/nodules  Lungs: clear to auscultation bilaterally  Heart: regular rate and rhythm, S1, S2 normal, no murmur, click, rub or gallop  Abdomen: soft, non-tender; bowel sounds normal; no masses,  no organomegaly  Extremities: extremities normal, warm and well-perfused; no cyanosis, clubbing, or edema  Pulses: 2+ and symmetric  Neurologic: Mental status: alertness: lethargic, orientation: person  Ruelas patent for clear mercedes urine     Laboratory Studies  Lab Results   Component Value Date     02/24/2014    K 5 0 11/14/2018    K 4 7 02/24/2014     (H) 11/14/2018     02/24/2014    CO2 26 11/14/2018    CO2 26 02/24/2014    GLUCOSE 114 02/24/2014    CREATININE 1 23 11/14/2018    CREATININE 0 92 02/24/2014    BUN 25 11/14/2018    BUN 23 02/24/2014       Imaging and Other Studies  )No results found  Medications   Scheduled Meds:  Current Facility-Administered Medications:  acetaminophen 650 mg Oral Q6H PRN Beulah Alexander PA-C   cyanocobalamin 100 mcg Oral Daily Nicolas Pouch MEGAN Daniels   gabapentin 300 mg Oral TID Nicolasjulieta Lam PA-C   heparin (porcine) 5,000 Units Subcutaneous [facility] Nicolas Libertad Daniels PA-C   sodium chloride 2 g Oral Daily Nicolas [facility] [place], [place]   tamsulosin 0 4 mg Oral Daily With Flaquita Kim PA-C     Continuous Infusions:   PRN Meds:   acetaminophen      Total time spent with patient 25 minutes, >50% spent counseling and/or coordination of care           )LONI Weinstein

## 2018-11-14 NOTE — H&P
H&P- [facility] 8/14/1926, 80 y o  male MRN: [id number]    Unit/Bed#: -01 Encounter: [id number]    Primary Care Provider: Georgi Gilman DO   Date and time admitted to hospital: 11/14/2018  1:10 AM    * Urinary retention   Assessment & Plan    · Patient initially presented to ED yesterday with wife after inability to urinate  Had been straight cath and sent home without saleh at request of patient's wife  Returned today for same  Patient has history of urinary retention in the past associated with UTI and prior saleh catheter requirement  Reportedly has BPH, currently on flomax  No established outpatient urology follow up currently  · UA negative for UTI yesterday, afebrile, no leukocytosis  · Found to have > 800 cc on bladder scan while in ED  Saleh catheter in place with light yellow urine  Given dementia will likely need mitts to prevent patient pulling on saleh  · Continue flomax  · Urology consult     Acute kidney injury [facility])   Assessment & Plan    · Creatinine on admission 1 42, baseline near 1 1-1 2  · Likely in the setting of post-renal secondary to BPH  · Continue to monitor, re-check BMP      History of throat cancer   Assessment & Plan    · Stable  · Follows with ENT at [place]    · Currently stable  · Delirium precautions, fall precautions  · Avoid benzodiazepines if possible  · Supportive care       VTE Prophylaxis: Heparin  / sequential compression device   Code Status: Level 3 - DNR/DNI  POLST: There is no POLST form on file for this patient (pre-hospital)  Discussion with family:  Discussed at length with wife and daughter at bedside  Anticipated Length of Stay:  Patient will be admitted on an Observation basis with an anticipated length of stay of  Less than 2 midnights  Justification for Hospital Stay: LEXIE, urinary retention    Total Time for Visit, including Counseling / Coordination of Care: 30 minutes    Greater than 50% of this total time spent on direct patient counseling and coordination of care  Chief Complaint:   Inability to urinate    History of Present Illness: Steward Soulier is a 80 y o  male who presents with inability to urinate x 2 days  Past medical history significant for dementia, throat cancer, and urinary retention  HPI mainly gathered from patient's wife and daughter at bedside  Patient's wife states that patient has had intermittent issues with urinary retention in the past   She states that starting yesterday, patient began complaining of abdominal pain and inability to urinate  He presented to the ED where patient required straight catheterization and was sent home without Ruelas at the request of the patient's wife  She verbalizes concern for caring for the patient long-term in regards to a Ruelas catheter given his dementia and history of physically removing catheters  Initially family was insistent on prophylactic antibiotics, however after extensive discussion agreed that at this time antibiotics are not warranted  Patient's wife denies any knowledge of patient having fever/chills or flank pain  Otherwise has offered no other complaints  Review of Systems:    Review of Systems   Unable to perform ROS: Dementia       Past Medical and Surgical History:     Past Medical History:   Diagnosis Date    Gall stone     Hypotension     [facility] ([facility] [address] )     Urinary retention        History reviewed  No pertinent surgical history  Meds/Allergies:    Prior to Admission medications    Medication Sig Start Date End Date Taking?  Authorizing Provider   ALPRAZolam Mateus Peguero) 0 25 mg tablet Take by mouth daily at bedtime as needed for anxiety   Yes Historical Provider, MD   cyanocobalamin (VITAMIN B-12) 100 mcg tablet Take by mouth daily   Yes Historical Provider, MD   gabapentin (NEURONTIN) 600 MG tablet Take 300 mg by mouth 3 (three) times a day   Yes Historical Provider, MD HYDROcodone-acetaminophen (VICODIN) 5-300 MG per tablet Take 1 tablet by mouth every 6 (six) hours as needed for moderate pain   Yes Historical Provider, MD   sodium chloride 1 g tablet Take 2 g by mouth daily   Yes Historical Provider, MD   tamsulosin (FLOMAX) 0 4 mg Take 0 4 mg by mouth   10/12/17  Yes Historical Provider, MD     I have reviewed home medications with patient family member  Allergies: No Known Allergies    Social History:     Marital Status: Registered Domestic Partner   Occupation: Retired  Patient Pre-hospital Living Situation: Lives at home with wife  Patient Pre-hospital Level of Mobility: Requires walker to ambulate  Patient Pre-hospital Diet Restrictions: None  Substance Use History:   History   Alcohol Use No     History   Smoking Status    Never Smoker   Smokeless Tobacco    Never Used     History   Drug Use No       Family History:    History reviewed  No pertinent family history  Physical Exam:     Vitals:   Blood Pressure: 124/88 (11/14/18 0114)  Pulse: 61 (11/14/18 0114)  Temperature: 97 9 °F (36 6 °C) (11/14/18 0114)  Temp Source: Oral (11/14/18 0114)  Respirations: 20 (11/14/18 0114)  Weight - Scale: 96 9 kg (213 lb 10 oz) (11/14/18 0114)  SpO2: 97 % (11/14/18 0114)    Physical Exam   Constitutional: Vital signs are normal  He appears well-developed  HENT:   Head: Normocephalic  Eyes: Pupils are equal, round, and reactive to light  EOM are normal  No scleral icterus  Neck: Normal range of motion  Cardiovascular: Normal rate, regular rhythm and normal heart sounds  No murmur heard  Pulmonary/Chest: Effort normal and breath sounds normal  No respiratory distress  Abdominal: Soft  Bowel sounds are normal  There is no tenderness  Genitourinary:   Genitourinary Comments: Ruelas catheter in place, bag with clear yellow urine  Musculoskeletal: He exhibits no edema  Neurological: He is alert  Patient oriented to self  Skin: Skin is warm and dry  Psychiatric: Cognition and memory are impaired  Additional Data:     Lab Results: I have personally reviewed pertinent reports  Results from last 7 days  Lab Units 11/14/18  0145   WBC Thousand/uL 6 69   HEMOGLOBIN g/dL 13 1   HEMATOCRIT % 39 7   PLATELETS Thousands/uL 136*   NEUTROS ABS Thousands/µL 3 66   NEUTROS PCT % 55   LYMPHS PCT % 34   MONOS PCT % 8   EOS PCT % 2       Results from last 7 days  Lab Units 11/14/18  0145   POTASSIUM mmol/L 4 8   CHLORIDE mmol/L 108   CO2 mmol/L 27   BUN mg/dL 27*   CREATININE mg/dL 1 43*   ANION GAP mmol/L 8   CALCIUM mg/dL 8 4                       Imaging: I have personally reviewed pertinent reports  No orders to display       EKG, Pathology, and Other Studies Reviewed on Admission:   · UA: negative for UTI    Allscripts / Epic Records Reviewed: Yes     ** Please Note: This note has been constructed using a voice recognition system   **

## 2018-11-14 NOTE — ASSESSMENT & PLAN NOTE
· Patient initially presented to ED yesterday with wife after inability to urinate  Had been straight cath and sent home without saleh at request of patient's wife  Returned today for same  Patient has history of urinary retention in the past associated with UTI and prior saleh catheter requirement  Reportedly has BPH, currently on flomax  No established outpatient urology follow up currently  · UA negative for UTI yesterday, afebrile, no leukocytosis  · Found to have > 800 cc on bladder scan while in ED  Saleh catheter in place with light yellow urine  Given dementia will likely need mitts to prevent patient pulling on saleh    · Continue flomax  · Urology consult

## 2018-11-14 NOTE — PLAN OF CARE
Problem: PHYSICAL THERAPY ADULT  Goal: Performs mobility at highest level of function for planned discharge setting  See evaluation for individualized goals  Treatment/Interventions: Functional transfer training, LE strengthening/ROM, Therapeutic exercise, Endurance training, Cognitive reorientation, Patient/family training, Equipment eval/education, Bed mobility, Gait training  Equipment Recommended: Other (Comment) (roller awlker)       See flowsheet documentation for full assessment, interventions and recommendations  Outcome: Progressing  Prognosis: Fair  Problem List: Decreased strength, Decreased endurance, Impaired balance, Decreased mobility, Decreased coordination, Decreased cognition, Decreased safety awareness, Impaired hearing  Assessment: Therapist provided education to pt for mobility technique including transfers and gait w/ roller walker  Education was provided due to findings from evaluation  Frequent repetition was needed for carryover to be noted  Pt was found to have improvement after education w/ decreased level of assist to maintain safety and increased ambulation distance  Pt continues to be a fall risk  continued inpatient PT tx is indicated to reduce fall risk factors  Recommendation: Home PT          See flowsheet documentation for full assessment

## 2018-11-14 NOTE — UTILIZATION REVIEW
Initial Clinical Review    Admission: Date/Time/Statement: 11/14/2018  0216 OBSERVATION    Orders Placed This Encounter   Procedures    Place in Observation (expected length of stay for this patient is less than two midnights)     Standing Status:   Standing     Number of Occurrences:   1     Order Specific Question:   Admitting Physician     Answer:   Ara Shukla     Order Specific Question:   Level of Care     Answer:   Med Surg [16]         ED: Date/Time/Mode of Arrival:   ED Arrival Information     Expected Arrival Acuity Means of Arrival Escorted By Service Admission Type    - 11/14/2018 01:03 Urgent Wheelchair Family Member Hospitalist Urgent    Arrival Complaint    Difficulty urinating          Chief Complaint:   Chief Complaint   Patient presents with    Difficulty Urinating     per spouse, "he is having some difficulty urinating which has been going on for a couple of hours ago, per spouse pt has been urinating all day, pt was seen at  [facility] yesterday for the same issue "       History of Illness: 80 y o  male who presents with inability to urinate x 2 days  Past medical history significant for dementia, throat cancer, and urinary retention  HPI mainly gathered from patient's wife and daughter at bedside  Patient's wife states that patient has had intermittent issues with urinary retention in the past   She states that starting yesterday, patient began complaining of abdominal pain and inability to urinate  He presented to the ED where patient required straight catheterization and was sent home without Ruelas at the request of the patient's wife  She verbalizes concern for caring for the patient long-term in regards to a Ruelas catheter given his dementia and history of physically removing catheters  Initially family was insistent on prophylactic antibiotics, however after extensive discussion agreed that at this time antibiotics are not warranted    Patient's wife denies any knowledge of patient having fever/chills or flank pain  ED Vital Signs:   ED Triage Vitals [11/14/18 0114]   Temperature Pulse Respirations Blood Pressure SpO2   97 9 °F (36 6 °C) 61 20 124/88 97 %      Temp Source Heart Rate Source Patient Position - Orthostatic VS BP Location FiO2 (%)   Oral Monitor Lying Right arm --      Pain Score       Worst Possible Pain        Wt Readings from Last 1 Encounters:   11/14/18 96 9 kg (213 lb 10 oz)       Vital Signs (abnormal): low pulse 53  Bladder scan @ 850 cc  Exam tenderness in suprapubic area  Cognition and memory impaired  impulsive    Abnormal Labs/Diagnostic Test Results:   Bun 27  Creatinine 1 43  Wbc 6 69, hgb 13 1, hct 39 7  Platelets 753  ED Treatment: urethral catheter - output initially 900 cc  Medication Administration from 11/14/2018 0102 to 11/14/2018 0255       Date/Time Order Dose Route Action Comments     11/14/2018 0145 sodium chloride 0 9 % bolus 1,000 mL 1,000 mL Intravenous New Bag           Past Medical/Surgical History:   Past Medical History:   Diagnosis Date    Gall stone     Hypotension     Throat cancer ([facility] [address] )     Urinary retention        Admitting Diagnosis: Difficulty urinating [R39 198]  Dementia [F03 90]  Acute urinary retention [R33 8]    Age/Sex: 80 y o  male    Assessment/Plan:   Urinary retention   Assessment & Plan     · Patient initially presented to ED yesterday with wife after inability to urinate  Had been straight cath and sent home without saleh at request of patient's wife  Returned today for same  Patient has history of urinary retention in the past associated with UTI and prior saleh catheter requirement  Reportedly has BPH, currently on flomax  No established outpatient urology follow up currently  · UA negative for UTI yesterday, afebrile, no leukocytosis  · Found to have > 800 cc on bladder scan while in ED  Saleh catheter in place with light yellow urine    Given dementia will likely need mitts to prevent patient pulling on saleh  · Continue flomax  · Urology consult      Acute kidney injury [facility])   Assessment & Plan     · Creatinine on admission 1 42, baseline near 1 1-1 2  · Likely in the setting of post-renal secondary to BPH  · Continue to monitor, re-check BMP       History of throat cancer   Assessment & Plan     · Stable  · Follows with ENT at [facility]     · Currently stable  · Delirium precautions, fall precautions  · Avoid benzodiazepines if possible  · Supportive care         Admission Orders:  11/14/2018  OBSERVATION   Scheduled Meds:   Current Facility-Administered Medications:  acetaminophen 650 mg Oral Q6H PRN   bisacodyl 10 mg Rectal Daily PRN   cyanocobalamin 100 mcg Oral Daily   gabapentin 300 mg Oral TID   heparin (porcine) 5,000 Units Subcutaneous Q8H [address]   senna 1 tablet Oral HS   sodium chloride 2 g Oral Daily   tamsulosin 0 4 mg Oral Daily With Dinner     Continuous Infusions:    PRN Meds: not used  OTHER ORDERS: scds  Restraints non violent  Consult urology  PT/OT      [address]  Utilization Review Department  Phone: [phone]; Fax [phone]  [email or web address]  ATTENTION: Please call with any questions or concerns to [phone]  and carefully listen to the prompts so that you are directed to the right person  Send all requests for admission clinical reviews, approved or denied determinations and any other requests to fax [phone]   All voicemails are confidential

## 2018-11-14 NOTE — PLAN OF CARE
Problem: PHYSICAL THERAPY ADULT  Goal: Performs mobility at highest level of function for planned discharge setting  See evaluation for individualized goals  Treatment/Interventions: Functional transfer training, LE strengthening/ROM, Therapeutic exercise, Endurance training, Cognitive reorientation, Patient/family training, Equipment eval/education, Bed mobility, Gait training  Equipment Recommended: Other (Comment) (roller awlker)       See flowsheet documentation for full assessment, interventions and recommendations  Prognosis: Fair  Problem List: Decreased strength, Decreased endurance, Impaired balance, Decreased mobility, Decreased coordination, Decreased cognition, Decreased safety awareness, Impaired hearing  Assessment: Pt began complaining of abdominal pain and inability to urinate  Dx: urinary retention, LEXIE, and dementia  order placed for PT eval and tx, w/ activity order of up w/ A and fall precautions  pt presents w/ comorbidities of dementia, throat cancer, urinary retention, and hypotension and personal factors of mobilizing w/ assistive device, decreased cognition, inability to perform IADLs and inability to perform ADLs  pt presents w/ weakness, decreased endurance, impaired cognition, impaired balance, gait deviations, altered sensation, impaired hearing, impaired coordination, decreased safety awareness and fall risk  these impairments are evident in findings from physical examination (weakness, altered sensation and impaired coordination), mobility assessment (need for standby to min assist w/ all phases of mobility when usually mobilizing independently, tolerance to only 90 feet of ambulation and need for cueing for mobility technique), and Barthel Index: 55/100  pt needed input for task focus and mobility technique/safety  pt is at risk for falls due to physical and safety awareness deficits   pt's clinical presentation is unstable/unpredictable (evident in need for assist w/ all phases of mobility when usually mobilizing independently, tolerance to only 90 feet of ambulation and need for input for task focus and mobility technique/safety w/ limited carryover of education received)  pt needs inpatient PT tx to improve mobility deficits  discharge recommendation is for home PT to reduce fall risk and maximize level of functional independence  Recommendation: Home PT          See flowsheet documentation for full assessment

## 2018-11-14 NOTE — PHYSICAL THERAPY NOTE
PHYSICAL THERAPY TREATMENT NOTE    Patient Name: Giovanny Prakash  SBDRQ'T Date: 11/14/2018 11/14/18 1379   Restrictions/Precautions   Other Precautions Impulsive;Cognitive; Chair Alarm; Bed Alarm; Fall Risk;Hard of hearing   General   Chart Reviewed Yes   Family/Caregiver Present Yes   Cognition   Overall Cognitive Status Impaired   Arousal/Participation Cooperative   Attention Attends with cues to redirect   Orientation Level Oriented to person;Disoriented to place; Disoriented to time;Disoriented to situation; Other (Comment)  (pt was identifeid w/ full name, ID bracelet)   Following Commands Follows one step commands with increased time or repetition   Subjective   Subjective pt agreed to PT intervention  mobility education was provided regarding transfers and roller walker use   education was completed via demonstration and verbal instruction  Bed Mobility   Additional Comments sit <---> stand was completed 4x w/ verbal cues for technique  pt noted improved technique on 3rd and 4th reps (progressing to need for only supervision assist w/ sit to stand)  Transfers   Sit to Stand 5  Supervision   Additional items Increased time required;Verbal cues  (for hand placement)   Stand to Sit 5  Supervision   Additional items Verbal cues  (for hand positioning)   Ambulation/Elevation   Gait pattern Shuffling;Decreased foot clearance   Gait Assistance 5  Supervision  (w/ chair follow)   Additional items Verbal cues; Tactile cues  (for walker placement, full step length)   Assistive Device Rolling walker   Distance 160 feet  (additional not possible due to fatigue)   Balance   Static Sitting Good   Dynamic Sitting Fair   Static Standing Fair   Dynamic Standing Fair   Ambulatory Fair -  (w/ roller walker)   Activity Tolerance   Activity Tolerance Patient limited by fatigue   [facility]urse Made A[facility]   Assessment Prognosis Fair   Problem List Decreased strength;Decreased endurance; Impaired balance;Decreased mobility; Decreased coordination;Decreased cognition;Decreased safety awareness; Impaired hearing   Assessment Therapist provided education to pt for mobility technique including transfers and gait w/ roller walker  Education was provided due to findings from evaluation  Frequent repetition was needed for carryover to be noted  Pt was found to have improvement after education w/ decreased level of assist to maintain safety and increased ambulation distance  Pt continues to be a fall risk  continued inpatient PT tx is indicated to reduce fall risk factors  Goals   Patient Goals go home   STG Expiration Date 11/24/18   Short Term Goal #1 pt will: Increase bilateral LE strength 1/2 grade to facilitate independent mobility, Perform all bed mobility tasks independently to decrease fall risk factors, Perform all transfers independently to improve independence, Ambulate 400 ft  with roller walker independently w/o LOB to expedite safe return home, Increase ambulatory balance 1 grade to decrease risk for falls, Complete exercise program w/ less than 25% input from therapist during session to increase overall activity tolerance, Tolerate 3 hr OOB to faciliate upright tolerance and Improve Barthel Index score to 80 or greater to facilitate independence   Treatment Day 1   Plan   Treatment/Interventions Functional transfer training;LE strengthening/ROM; Therapeutic exercise; Endurance training;Cognitive reorientation;Patient/family training;Equipment eval/education; Bed mobility;Gait training   Progress Progressing toward goals   PT Frequency 5x/wk   Recommendation   Recommendation Home PT   Equipment Recommended Other (Comment)  (roller walker)     Skilled inpatient PT recommended while in hospital to progress pt toward treatment goals      Silver Herrera, PT

## 2018-11-14 NOTE — ASSESSMENT & PLAN NOTE
· Stable  · Follows with ENT at [facility]

## 2018-11-14 NOTE — ED PROVIDER NOTES
History  Chief Complaint   Patient presents with    Difficulty Urinating     per spouse, "he is having some difficulty urinating which has been going on for a couple of hours ago, per spouse pt has been urinating all day, pt was seen at  [facility] yesterday for the same issue "     42-year-old male with a history of dementia presents to the emergency department for evaluation of difficulty urinating  Patient was seen in the department yesterday for acute urinary retention  The patient has had prior episodes in the past and the patient's wife was very concerned about the placement of a Ruelas catheter  When the patient had an indwelling Ruelas catheter for a prior episode of urinary retention 2 years ago he pulled the catheter out 3 times  He ended up requiring hospitalization at a skilled nursing facility for a short period of time  The patient has not had fevers or chills  He has been voiding very small amounts of urine frequently today  He presents with approximately 850 cc of urine in his bladder on bladder scan  At his recent visit his creatinine was 1 42, slightly elevated from his baseline  He had a normal urinalysis  History provided by:  Patient and spouse  History limited by:  Dementia   used: No    Difficulty Urinating   Presenting symptoms: dysuria    Context: spontaneously    Relieved by:  Nothing  Worsened by:  Nothing  Ineffective treatments:  None tried  Associated symptoms: urinary frequency and urinary retention    Associated symptoms: no fever and no nausea    Risk factors: no change in medication, no recent infection and no urinary catheter        Prior to Admission Medications   Prescriptions Last Dose Informant Patient Reported? Taking?    ALPRAZolam (XANAX) 0 25 mg tablet   Yes Yes   Sig: Take by mouth daily at bedtime as needed for anxiety   HYDROcodone-acetaminophen (VICODIN) 5-300 MG per tablet   Yes Yes   Sig: Take 1 tablet by mouth every 6 (six) hours as needed for moderate pain   cyanocobalamin (VITAMIN B-12) 100 mcg tablet   Yes Yes   Sig: Take by mouth daily   gabapentin (NEURONTIN) 600 MG tablet   Yes Yes   Sig: Take 300 mg by mouth 3 (three) times a day   sodium chloride 1 g tablet   Yes Yes   Sig: Take 2 g by mouth daily   tamsulosin (FLOMAX) 0 4 mg   Yes Yes   Sig: Take 0 4 mg by mouth        Facility-Administered Medications: None       Past Medical History:   Diagnosis Date    Gall stone     Hypotension     Throat cancer ([facility] [address] )     Urinary retention        History reviewed  No pertinent surgical history  History reviewed  No pertinent family history  I have reviewed and agree with the history as documented  Social History   Substance Use Topics    Smoking status: Never Smoker    Smokeless tobacco: Never Used    Alcohol use No        Review of Systems   Unable to perform ROS: Dementia   Constitutional: Negative for fever  Gastrointestinal: Negative for nausea  Genitourinary: Positive for dysuria and frequency  Musculoskeletal: Negative for back pain  All other systems reviewed and are negative  Physical Exam  Physical Exam   Constitutional: He is oriented to person, place, and time  Vital signs are normal  He appears well-developed and well-nourished  He appears distressed  HENT:   Head: Normocephalic and atraumatic  Eyes: Pupils are equal, round, and reactive to light  Conjunctivae and EOM are normal    Neck: Normal range of motion  Neck supple  Cardiovascular: Normal rate, regular rhythm, normal heart sounds and intact distal pulses  Pulmonary/Chest: Effort normal and breath sounds normal  No accessory muscle usage  No respiratory distress  He exhibits no tenderness  Abdominal: Soft  Normal appearance and bowel sounds are normal  He exhibits no distension  There is tenderness in the suprapubic area  There is no rebound and no guarding  Musculoskeletal: Normal range of motion   He exhibits no edema, tenderness or deformity  Lymphadenopathy:     He has no cervical adenopathy  Neurological: He is alert and oriented to person, place, and time  He has normal strength and normal reflexes  Coordination normal    Skin: Skin is warm, dry and intact  No rash noted  Psychiatric: He has a normal mood and affect  His behavior is normal  Cognition and memory are impaired  He expresses impulsivity  Nursing note and vitals reviewed  Vital Signs  ED Triage Vitals [11/14/18 0114]   Temperature Pulse Respirations Blood Pressure SpO2   97 9 °F (36 6 °C) 61 20 124/88 97 %      Temp Source Heart Rate Source Patient Position - Orthostatic VS BP Location FiO2 (%)   Oral Monitor Lying Right arm --      Pain Score       Worst Possible Pain           Vitals:    11/14/18 0114   BP: 124/88   Pulse: 61   Patient Position - Orthostatic VS: Lying       Visual Acuity      ED Medications  Medications   sodium chloride 0 9 % bolus 1,000 mL (1,000 mL Intravenous New Bag 11/14/18 0145)       Diagnostic Studies  Results Reviewed     Procedure Component Value Units Date/Time    Basic metabolic panel [[id number]]  (Abnormal) Collected:  11/14/18 0145    Lab Status:  Final result Specimen:  Blood from Arm, Right Updated:  11/14/18 0201     Sodium 143 mmol/L      Potassium 4 8 mmol/L      Chloride 108 mmol/L      CO2 27 mmol/L      ANION GAP 8 mmol/L      BUN 27 (H) mg/dL      Creatinine 1 43 (H) mg/dL      Glucose 93 mg/dL      Calcium 8 4 mg/dL      eGFR 42 ml/min/1 73sq m     Narrative:         National Kidney Disease Education Program recommendations are as follows:  GFR calculation is accurate only with a steady state creatinine  Chronic Kidney disease less than 60 ml/min/1 73 sq  meters  Kidney failure less than 15 ml/min/1 73 sq  meters      CBC and differential [[id number]]  (Abnormal) Collected:  11/14/18 0145    Lab Status:  Final result Specimen:  Blood from Arm, Right Updated:  11/14/18 0154     WBC 6 69 Thousand/uL      RBC 4 25 Million/uL Hemoglobin 13 1 g/dL      Hematocrit 39 7 %      MCV 93 fL      MCH 30 8 pg      MCHC 33 0 g/dL      RDW 13 0 %      MPV 11 1 fL      Platelets 689 (L) Thousands/uL      nRBC 0 /100 WBCs      Neutrophils Relative 55 %      Immat GRANS % 1 %      Lymphocytes Relative 34 %      Monocytes Relative 8 %      Eosinophils Relative 2 %      Basophils Relative 0 %      Neutrophils Absolute 3 66 Thousands/µL      Immature Grans Absolute 0 04 Thousand/uL      Lymphocytes Absolute 2 28 Thousands/µL      Monocytes Absolute 0 56 Thousand/µL      Eosinophils Absolute 0 12 Thousand/µL      Basophils Absolute 0 03 Thousands/µL                  No orders to display              Procedures  Procedures       Phone Contacts  ED Phone Contact    ED Course  ED Course as of Nov 14 0242 Wed Nov 14, 2018   [address] Patient's wife and daughter refused to take the patient home  They do not feel that he is safe at home with a catheter in place as he has pulled out multiple times in the past   They are insistent that I place the patient on antibiotics prophylactically                                  MDM  Number of Diagnoses or Management Options  Acute urinary retention: new and requires workup  Dementia: new and requires workup     Amount and/or Complexity of Data Reviewed  Clinical lab tests: ordered and reviewed  Decide to obtain previous medical records or to obtain history from someone other than the patient: yes  Obtain history from someone other than the patient: yes  Independent visualization of images, tracings, or specimens: yes    Patient Progress  Patient progress: stable    CritCare Time    Disposition  Final diagnoses:   Acute urinary retention   Dementia     Time reflects when diagnosis was documented in both MDM as applicable and the Disposition within this note     Time User Action Codes Description Comment    11/14/2018  2:12 AM Ольга Kraft Add [R33 8] Acute urinary retention     11/14/2018  2:13 AM Ольга Kraft Add [F03 90] Dementia       ED Disposition     ED Disposition Condition Comment    Admit  Case was discussed with JESSICA Barbosa and the patient's admission status was agreed to be Admission Status: observation status to the service of Dr Leif Bowie   Follow-up Information    None         Patient's Medications   Discharge Prescriptions    No medications on file     No discharge procedures on file      ED Provider  Electronically Signed by           Meenakshi Dunn DO  11/14/18 9564

## 2018-11-14 NOTE — ASSESSMENT & PLAN NOTE
· Creatinine on admission 1 42, baseline near 1 1-1 2  · Likely in the setting of post-renal secondary to BPH  · Continue to monitor, re-check BMP

## 2018-11-14 NOTE — PLAN OF CARE
Problem: DISCHARGE PLANNING - CARE MANAGEMENT  Goal: Discharge to post-acute care or home with appropriate resources  INTERVENTIONS:  - Conduct assessment to determine patient/family and health care team treatment goals, and need for post-acute services based on payer coverage, community resources, and patient preferences, and barriers to discharge  - Address psychosocial, clinical, and financial barriers to discharge as identified in assessment in conjunction with the patient/family and health care team  - Arrange appropriate level of post-acute services according to patients   needs and preference and payer coverage in collaboration with the physician and health care team  - Communicate with and update the patient/family, physician, and health care team regarding progress on the discharge plan  - Arrange appropriate transportation to post-acute venues  Outcome: Progressing  PATIENT IS ON OBS  PATIENT IS NOT A BUNDLE  PATIENT IS NOT A READMISSION  Observation notice reviewed  Copy provided to patient  Copy placed in medical records  Patient currently is confused and baseline and patient spouse is primary care take  Patient was seen by PT and determined that he did  Very well during evaluation however, will benefit from VNA  CM spoke with patient spouse about VNA options and determined that she would prefer a referral be sent to [facility] for SN and PT  At this time, patient spouse is interested in patient remain for "few days" however, CM explained that pending medical stability, patient maybe able to discharge home later today  CM explained to spouse the attending physician will round on patient today and discuss options and discharge frame  CM will send referral for [facility] and will follow through discharge

## 2018-11-14 NOTE — ASSESSMENT & PLAN NOTE
· Currently stable  · Delirium precautions, fall precautions  · Avoid benzodiazepines if possible  · Supportive care

## 2018-11-14 NOTE — PHYSICAL THERAPY NOTE
PHYSICAL THERAPY EVALUATION NOTE    Patient Name: Julianna Loaj  [id number] Date: 11/14/2018  AGE:   80 y o  Mrn:   [id number]  ADMIT DX:  Difficulty urinating [R39 198]  Dementia [F03 90]  Acute urinary retention [R33 8]    Past Medical History:   Diagnosis Date    Gall stone     Hypotension     Throat cancer ([facility] [address] )     Urinary retention      Length Of Stay: 0  PHYSICAL THERAPY EVALUATION :    11/14/18 0836   Pain Assessment   Pain Assessment No/denies pain   Home Living   Type of [address] [place] [address] Two level; Able to live on main level with bedroom/bathroom; Ramped entrance   Additional Comments lives w/ spouse  ambulated w/ roller walker  receives assist w/ ADLs as needed  no falls in last 6 months  Prior Function   Comments pt seen supine in bed w/ wife present  pt agreed to participate in PT eval  denied pain or dizziness  pt needed occasional input for task focus  social history was provided by pt's spouse  pt wants to go home  Restrictions/Precautions   Other Precautions Impulsive;Cognitive; Chair Alarm; Bed Alarm; Fall Risk;Hard of hearing   General   Additional Pertinent History room air resting pulse ox 96% and 67 BPM, active 94% and 83 BPM    Family/Caregiver Present Yes   Cognition   Overall Cognitive Status Impaired   Arousal/Participation Cooperative   Orientation Level Oriented to person;Disoriented to place; Disoriented to time;Disoriented to situation; Other (Comment)  (pt was identifeid w/ full name, ID bracelet)   Following Commands Follows one step commands with increased time or repetition   RUE Assessment   RUE Assessment WFL  (4-/5, shoulder 3+/5)   LUE Assessment   LUE Assessment WFL  (4-/5, shoulder 3+/5)   RLE Assessment   RLE Assessment WFL  (4-/5)   LLE Assessment   LLE Assessment WFL  (4-/5)   Coordination   Movements are Fluid and Coordinated 0   Coordination and Movement Description impaired coordination all extremities   Sensation X  (impaired hearing)   Light Touch   RLE Light Touch Impaired   RLE Light Touch Comments foot and toes   LLE Light Touch Impaired   LLE Light Touch Comments foot and toes   Bed Mobility   Supine to Sit 5  Supervision   Additional items HOB elevated; Bedrails; Increased time required   Transfers   Sit to Stand 4  Minimal assistance   Additional items Assist x 1; Increased time required;Verbal cues  (for hand placement, LE positioning)   Stand to Sit 5  Supervision   Additional items Impulsive;Verbal cues  (for body positioning, controlled descent)   Ambulation/Elevation   Gait pattern Shuffling;Decreased foot clearance; Foward flexed; Inconsistent sergei   Gait Assistance 4  Minimal assist  (w/ chair follow)   Additional items Assist x 1;Verbal cues; Tactile cues  (for walker placement, full step length, posture)   Assistive Device Rolling walker   Distance 90 feet  (additional not possible due to fatigue)   Balance   Static Sitting Good   Dynamic Sitting Fair   Static Standing Fair -   Dynamic Standing Fair -   Ambulatory Poor +  (w/ roller walker)   Activity Tolerance   Activity Tolerance Patient limited by fatigue   Nurse Made Aware [facility]   Assessment   Prognosis Fair   Problem List Decreased strength;Decreased endurance; Impaired balance;Decreased mobility; Decreased coordination;Decreased cognition;Decreased safety awareness; Impaired hearing   Assessment Pt began complaining of abdominal pain and inability to urinate  Dx: urinary retention, LEXIE, and dementia  order placed for PT eval and tx, w/ activity order of up w/ A and fall precautions  pt presents w/ comorbidities of dementia, throat cancer, urinary retention, and hypotension and personal factors of mobilizing w/ assistive device, decreased cognition, inability to perform IADLs and inability to perform ADLs   pt presents w/ weakness, decreased endurance, impaired cognition, impaired balance, gait deviations, altered sensation, impaired hearing, impaired coordination, decreased safety awareness and fall risk  these impairments are evident in findings from physical examination (weakness, altered sensation and impaired coordination), mobility assessment (need for standby to min assist w/ all phases of mobility when usually mobilizing independently, tolerance to only 90 feet of ambulation and need for cueing for mobility technique), and Barthel Index: 55/100  pt needed input for task focus and mobility technique/safety  pt is at risk for falls due to physical and safety awareness deficits  pt's clinical presentation is unstable/unpredictable (evident in need for assist w/ all phases of mobility when usually mobilizing independently, tolerance to only 90 feet of ambulation and need for input for task focus and mobility technique/safety w/ limited carryover of education received)  pt needs inpatient PT tx to improve mobility deficits  discharge recommendation is for home PT to reduce fall risk and maximize level of functional independence  Goals   Patient Goals go home   STG Expiration Date 11/24/18   Short Term Goal #1 pt will: Increase bilateral LE strength 1/2 grade to facilitate independent mobility, Perform all bed mobility tasks independently to decrease fall risk factors, Perform all transfers independently to improve independence, Ambulate 400 ft  with roller walker independently w/o LOB to expedite safe return home, Increase ambulatory balance 1 grade to decrease risk for falls, Complete exercise program w/ less than 25% input from therapist during session to increase overall activity tolerance, Tolerate 3 hr OOB to faciliate upright tolerance and Improve Barthel Index score to 80 or greater to facilitate independence   Plan   Treatment/Interventions Functional transfer training;LE strengthening/ROM; Therapeutic exercise; Endurance training;Cognitive reorientation;Patient/family training;Equipment eval/education; Bed mobility;Gait training   PT Frequency 5x/wk   Recommendation   Recommendation Home PT   Equipment Recommended Other (Comment)  (roller awlker)   Barthel Index   Feeding 10   Bathing 0   Grooming Score 5   Dressing Score 5   Bladder Score 0   Bowels Score 10   Toilet Use Score 5   Transfers (Bed/Chair) Score 10   Mobility (Level Surface) Score 10   Stairs Score 0   Barthel Index Score 55     Skilled PT recommended while in hospital and upon DC to progress pt toward treatment goals       Marissa Larry, PT

## 2018-11-15 ENCOUNTER — TELEPHONE (OUTPATIENT)
Dept: OTHER | Facility: HOSPITAL | Age: 83
End: 2018-11-15

## 2018-11-15 ENCOUNTER — HOSPITAL ENCOUNTER (EMERGENCY)
Facility: HOSPITAL | Age: 83
Discharge: HOME | End: 2018-11-16
Attending: EMERGENCY MEDICINE
Payer: COMMERCIAL

## 2018-11-15 VITALS
WEIGHT: 213.63 LBS | SYSTOLIC BLOOD PRESSURE: 140 MMHG | RESPIRATION RATE: 18 BRPM | HEART RATE: 58 BPM | DIASTOLIC BLOOD PRESSURE: 62 MMHG | TEMPERATURE: 97.5 F | OXYGEN SATURATION: 99 %

## 2018-11-15 VITALS
OXYGEN SATURATION: 97 % | HEART RATE: 61 BPM | TEMPERATURE: 98.3 F | SYSTOLIC BLOOD PRESSURE: 157 MMHG | DIASTOLIC BLOOD PRESSURE: 67 MMHG | RESPIRATION RATE: 18 BRPM

## 2018-11-15 DIAGNOSIS — R33.8 ACUTE URINARY RETENTION: Primary | ICD-10-CM

## 2018-11-15 LAB
ANION GAP SERPL CALCULATED.3IONS-SCNC: 6 MMOL/L (ref 4–13)
BUN SERPL-MCNC: 21 MG/DL (ref 5–25)
CALCIUM SERPL-MCNC: 8.1 MG/DL (ref 8.3–10.1)
CHLORIDE SERPL-SCNC: 108 MMOL/L (ref 100–108)
CO2 SERPL-SCNC: 28 MMOL/L (ref 21–32)
CREAT SERPL-MCNC: 1.15 MG/DL (ref 0.6–1.3)
GFR SERPL CREATININE-BSD FRML MDRD: 55 ML/MIN/1.73SQ M
GLUCOSE P FAST SERPL-MCNC: 94 MG/DL (ref 65–99)
GLUCOSE SERPL-MCNC: 94 MG/DL (ref 65–140)
MAGNESIUM SERPL-MCNC: 1.9 MG/DL (ref 1.6–2.6)
POTASSIUM SERPL-SCNC: 4.8 MMOL/L (ref 3.5–5.3)
SODIUM SERPL-SCNC: 142 MMOL/L (ref 136–145)

## 2018-11-15 PROCEDURE — 99283 EMERGENCY DEPT VISIT LOW MDM: CPT

## 2018-11-15 PROCEDURE — 83735 ASSAY OF MAGNESIUM: CPT | Performed by: NURSE PRACTITIONER

## 2018-11-15 PROCEDURE — 80048 BASIC METABOLIC PNL TOTAL CA: CPT | Performed by: INTERNAL MEDICINE

## 2018-11-15 PROCEDURE — 97110 THERAPEUTIC EXERCISES: CPT

## 2018-11-15 PROCEDURE — 99217 PR OBSERVATION CARE DISCHARGE MANAGEMENT: CPT | Performed by: NURSE PRACTITIONER

## 2018-11-15 PROCEDURE — 99225 PR SBSQ OBSERVATION CARE/DAY 25 MINUTES: CPT | Performed by: NURSE PRACTITIONER

## 2018-11-15 PROCEDURE — 97116 GAIT TRAINING THERAPY: CPT

## 2018-11-15 PROCEDURE — 97530 THERAPEUTIC ACTIVITIES: CPT

## 2018-11-15 RX ORDER — SENNOSIDES 8.6 MG
1 TABLET ORAL
Qty: 30 EACH | Refills: 0 | Status: SHIPPED | OUTPATIENT
Start: 2018-11-15

## 2018-11-15 RX ORDER — FINASTERIDE 5 MG/1
5 TABLET, FILM COATED ORAL DAILY
Qty: 30 TABLET | Refills: 1 | Status: SHIPPED | OUTPATIENT
Start: 2018-11-16

## 2018-11-15 RX ORDER — GABAPENTIN 300 MG/1
300 CAPSULE ORAL 3 TIMES DAILY
Qty: 90 CAPSULE | Refills: 0 | Status: SHIPPED | OUTPATIENT
Start: 2018-11-15

## 2018-11-15 RX ORDER — SODIUM CHLORIDE 1000 MG
1 TABLET, SOLUBLE MISCELLANEOUS DAILY
Status: DISCONTINUED | OUTPATIENT
Start: 2018-11-15 | End: 2018-11-15 | Stop reason: HOSPADM

## 2018-11-15 RX ORDER — BISACODYL 10 MG
10 SUPPOSITORY, RECTAL RECTAL DAILY PRN
Qty: 20 SUPPOSITORY | Refills: 0 | Status: SHIPPED | OUTPATIENT
Start: 2018-11-15

## 2018-11-15 RX ORDER — SODIUM CHLORIDE 1000 MG
1 TABLET, SOLUBLE MISCELLANEOUS DAILY
Status: DISCONTINUED | OUTPATIENT
Start: 2018-11-16 | End: 2018-11-15

## 2018-11-15 RX ADMIN — GABAPENTIN 300 MG: 300 CAPSULE ORAL at 10:11

## 2018-11-15 RX ADMIN — VITAM B12 100 MCG: 100 TAB at 10:21

## 2018-11-15 RX ADMIN — HEPARIN SODIUM 5000 UNITS: 5000 INJECTION, SOLUTION INTRAVENOUS; SUBCUTANEOUS at 06:41

## 2018-11-15 RX ADMIN — FINASTERIDE 5 MG: 5 TABLET, FILM COATED ORAL at 10:11

## 2018-11-15 NOTE — ASSESSMENT & PLAN NOTE
Patient initially presented to ED with wife after inability to urinate  Had been straight cathed and sent home without saleh at request of patient's wife  Returned for same  Patient has history of urinary retention in the past associated with UTI, prior saleh catheter requirement, and traumatic saleh removals  Reportedly has BPH, currently on flomax  No established outpatient urology follow up currently  · UA negative for UTI yesterday, afebrile, no leukocytosis  · Found to have > 800 cc on bladder scan while in ED  Saleh catheter in place with light yellow urine      · Urology consulted, appreciate input  · Started Proscar in addition to Flomax to assist with decreasing prostate size and voiding dynamics   · Successful voiding trial 11/15/18  · OK for discharge without catheter or straight caths per urology as patient voiding on his own   · Encourage oral hydration to prevent dehydration/ constipation  · Follow-up with urology in 1 month  · Pt has an appt with his PCP tomorrow in case he develops urinary retention after discharge

## 2018-11-15 NOTE — DISCHARGE SUMMARY
Discharge- Gorge Burkitt 8/14/1926, 80 y o  male MRN: [id number]    Unit/Bed#: -01 Encounter: [id number]    Primary Care Provider: Radha Sánchez DO   Date and time admitted to hospital: 11/14/2018  1:10 AM        * Urinary retention   Assessment & Plan    Patient initially presented to ED with wife after inability to urinate  Had been straight cathed and sent home without saleh at request of patient's wife  Returned for same  Patient has history of urinary retention in the past associated with UTI, prior saleh catheter requirement, and traumatic saleh removals  Reportedly has BPH, currently on flomax  No established outpatient urology follow up currently  · UA negative for UTI yesterday, afebrile, no leukocytosis  · Found to have > 800 cc on bladder scan while in ED  Saleh catheter in place with light yellow urine  · Urology consulted, appreciate input  · Started Proscar in addition to Flomax to assist with decreasing prostate size and voiding dynamics   · Successful voiding trial 11/15/18  · OK for discharge without catheter or straight caths per urology as patient voiding on his own   · Encourage oral hydration to prevent dehydration/ constipation  · Follow-up with urology in 1 month  · Pt has an appt with his PCP tomorrow in case he develops urinary retention after discharge      Acute kidney injury ([facility] [address] )   Assessment & Plan    Creatinine on admission 1 42 -> 1 23 -> 1 15, likely in the setting of post-renal secondary to BPH  Improved with saleh catheter  Pt underwent successful voiding trial  · Follow-up with PCP   Wife will encourage oral intake and monitor for voiding/ constipation      Dementia   Assessment & Plan    Currently at baseline per wife   · Supportive care  · Home PT arranged      History of throat cancer   Assessment & Plan    · Stable  · Follows with ENT at [facility] / Practitioner: Sree Parra  PCP: Radha Sánchez, DO  Admission Date:   Admission Orders     Ordered        11/14/18 0216  Place in Observation (expected length of stay for this patient is less than two midnights)  Once             Discharge Date: 11/15/18    Resolved Problems  Date Reviewed: 11/15/2018    None          Consultations During Hospital Stay:  · Urology - Dr Gomez [facility]    Procedures Performed:     · UA: normal     Significant Findings / Test Results:     · None    Incidental Findings:   · None     Test Results Pending at Discharge (will require follow up): · None     Outpatient Tests Requested:  · None    Complications:  None    Reason for Admission: Urinary retention, agitation     Hospital Course: Julianna Loja is a 80 y o  male patient who originally presented to the hospital on 11/14/2018 due to increased agitation and urinary retention  A saleh catheter was inserted in the ED  Patient evaluated by urology  Patient on Flomax 0 4 mg daily  Urology recommended adding Proscar 5 mg daily to assist with decreasing prostate size and improving voiding dynamics  Patient underwent a successful voiding trial  His caregiver is his wife who is at the bedside  She is encouraging oral fluids and will monitor for dehydration and constipation  She would like to take him home today  He has a follow-up appointment scheduled with his PCP tomorrow in case he develops urinary retention after discharge  Please see above list of diagnoses and related plan for additional information  Condition at Discharge: stable     Discharge Day Visit / Exam:     Subjective:  Patient observed resting up right in bed, comfortable  Alert to self but follows commands  Caregiver is his wife who is at bedside along with daughter  Patient tolerated saleh catheter removal at 6:45 am today  He voided twice  Void amount and PVRs reviewed with Urology NP  Patient declines any complaints  Wife requesting to take him home     Vitals: Blood Pressure: 140/62 (11/15/18 1021)  Pulse: 58 (11/15/18 0900)  Temperature: 97 5 °F (36 4 °C) (11/15/18 0900)  Temp Source: Oral (11/14/18 2203)  Respirations: 18 (11/15/18 0900)  Weight - Scale: 96 9 kg (213 lb 10 oz) (11/14/18 0114)  SpO2: 99 % (11/15/18 0900)  Exam:   Physical Exam   Constitutional: He appears well-developed  No distress  HENT:   Head: Normocephalic  Neck: Normal range of motion  Cardiovascular: Normal rate and regular rhythm  Pulmonary/Chest: Effort normal and breath sounds normal  No respiratory distress  He has no wheezes  He has no rhonchi  He has no rales  Abdominal: Soft  Bowel sounds are normal  He exhibits no distension  There is no tenderness  Musculoskeletal: Normal range of motion  He exhibits no edema or tenderness  Neurological: He is alert  Skin: Skin is warm and dry  He is not diaphoretic  Psychiatric: He has a normal mood and affect  His behavior is normal    Nursing note and vitals reviewed  Discussion with Family: Wife and daughter at bedside    Discharge instructions/Information to patient and family:   See after visit summary for information provided to patient and family  Provisions for Follow-Up Care:  See after visit summary for information related to follow-up care and any pertinent home health orders  Disposition:     Home with [facility] (Reminder: Complete face to face encounter)    For Discharges to [facility]:   · Not Applicable to this Patient - Not Applicable to this Patient    Planned Readmission: None     Discharge Statement:  I spent > 30 minutes discharging the patient  This time was spent on the day of discharge  I had direct contact with the patient on the day of discharge  Greater than 50% of the total time was spent examining patient, answering all patient questions, arranging and discussing plan of care with patient as well as directly providing post-discharge instructions  Additional time then spent on discharge activities   Coordination with urology and case management  Discharge Medications:  See after visit summary for reconciled discharge medications provided to patient and family        ** Please Note: This note has been constructed using a voice recognition system **

## 2018-11-15 NOTE — PHYSICAL THERAPY NOTE
PHYSICAL THERAPY NOTE    Patient Name: Willa Allison  [id number] Date: 11/15/2018        11/15/18 1008   Pain Assessment   Pain Assessment No/denies pain   Pain Score No Pain   Restrictions/Precautions   Other Precautions Impulsive;Cognitive; Chair Alarm; Bed Alarm; Fall Risk;Hard of hearing   General   Family/Caregiver Present Yes   Subjective   Subjective Patient supine in bed and is agreeable to therapy session  Patient identifers obtained from name &   Bed Mobility   Supine to Sit 5  Supervision   Additional items Assist x 1;HOB elevated; Bedrails; Increased time required   Sit to Supine Unable to assess   Additional Comments Patient seated OOB in recliner with chair alarm engaged, call bell and belongings in reach  Transfers   Sit to Stand 5  Supervision   Additional items Assist x 1; Armrests; Increased time required;Verbal cues   Stand to Sit 5  Supervision   Additional items Assist x 1; Armrests; Increased time required;Verbal cues   Toilet transfer 5  Supervision   Additional items Assist x 1; Increased time required;Verbal cues;Standard toilet  (R grab bar)   Ambulation/Elevation   Gait pattern Shuffling;Decreased foot clearance; Foward flexed; Inconsistent sergei   Gait Assistance 5  Supervision  (occasional contact guard)   Additional items Assist x 1;Verbal cues; Tactile cues   Assistive Device Rolling walker   Distance 170' x1, 15' x1   Balance   Static Sitting Good   Dynamic Sitting Fair   Static Standing Fair   Dynamic Standing Fair   Ambulatory Fair -  (with roller walker)   Activity Tolerance   Activity Tolerance Patient limited by fatigue   Nurse Made Aware Spoke to [place], RN    Exercises   Hip Flexion Sitting;5 reps;AROM; Bilateral  (less than 5 reps)   Knee AROM Long Arc Quad Sitting;5 reps;AROM; Bilateral  (less than 5 reps)   Ankle Pumps Sitting;5 reps;AROM; Bilateral   Assessment   Prognosis Fair   Problem List Decreased strength;Decreased endurance; Impaired balance;Decreased mobility; Decreased coordination;Decreased cognition;Decreased safety awareness; Impaired hearing   Assessment Patient remains consistent with supervision for supine to sit and sit<>stand transfers with verbal instruction for body positioning and hand placement with good follow through however poor carry over  Demonstrtaed ability to ambulate increased gait distance with roller walker and supervision to contact guard assist  Increased assistance of contact guard required at times secondary to increased distraction from activity in hallway requiring control of walker advancement and obstacle avoidance as well as verbal instruction for posture and step length  Attempt made to perform seated B LE exercise program however patient reluctant, performing minimal movements  Provided handout of HEP as well as verbal and visual demonstration to patient and spouse to increase understanding and form  Continue to focus on OOB mobility tasks with reinforcemetn of safe transfer technique and walker management as appropriate  Goals   Patient Goals to go home   STG Expiration Date 11/24/18   Treatment Day 2   Plan   Treatment/Interventions Functional transfer training;LE strengthening/ROM; Therapeutic exercise; Endurance training;Cognitive reorientation;Patient/family training;Equipment eval/education; Bed mobility;Gait training   Progress Progressing toward goals   PT Frequency 5x/wk   Recommendation   Recommendation Home PT   Equipment Recommended Other (Comment)  (roller walker)       Jack De La Cruz, THIERRY

## 2018-11-15 NOTE — PLAN OF CARE
Problem: PHYSICAL THERAPY ADULT  Goal: Performs mobility at highest level of function for planned discharge setting  See evaluation for individualized goals  Treatment/Interventions: Functional transfer training, LE strengthening/ROM, Therapeutic exercise, Endurance training, Cognitive reorientation, Patient/family training, Equipment eval/education, Bed mobility, Gait training  Equipment Recommended: Other (Comment) (roller awlker)       See flowsheet documentation for full assessment, interventions and recommendations  Outcome: Progressing  Prognosis: Fair  Problem List: Decreased strength, Decreased endurance, Impaired balance, Decreased mobility, Decreased coordination, Decreased cognition, Decreased safety awareness, Impaired hearing  Assessment: Patient remains consistent with supervision for supine to sit and sit<>stand transfers with verbal instruction for body positioning and hand placement with good follow through however poor carry over  Demonstrtaed ability to ambulate increased gait distance with roller walker and supervision to contact guard assist  Increased assistance of contact guard required at times secondary to increased distraction from activity in hallway requiring control of walker advancement and obstacle avoidance as well as verbal instruction for posture and step length  Attempt made to perform seated B LE exercise program however patient reluctant, performing minimal movements  Provided handout of HEP as well as verbal and visual demonstration to patient and spouse to increase understanding and form  Continue to focus on OOB mobility tasks with reinforcemetn of safe transfer technique and walker management as appropriate  Recommendation: Home PT          See flowsheet documentation for full assessment

## 2018-11-15 NOTE — PROGRESS NOTES
Progress Note - Bert Moon 80 y o  male MRN: [id number]    Unit/Bed#: -01 Encounter: [id number]      Assessment:  Mr Hartman Current is a 70-year-old male with dementia seen in consultation yesterday regarding urinary retention status post Ruelas placement  After long discussion with spouse, family elected to proceed with void trial   Today patient is afebrile and comfortable  Spontaneously voiding in small amounts with last bladder scan showing residual of approximately 216 mL  Patient denies any flank, testicular, groin or suprapubic pain, dysuria or gross hematuria  Patient had 1 medium bowel movement yesterday after administration of suppository  Plan:  Monitor void  Patient may make attempts empty frequently  However, in the absence of pain, recurrent UTI or renal failure, due to history of repeated self-induced Ruelas catheter trauma, it is in patient's best interest to refrain from catheterization if possible  Continue medical optimization  Ambulate, prevent/treat constipation and avoid narcotics  Continue alpha blockade  Follow-up in our office in approximately 1 month non urgently  No further  intervention indicated during this hospital stay  Will sign off  Subjective:   I feel good   As above    Objective:     Vitals: Blood pressure 140/62, pulse 58, temperature 97 5 °F (36 4 °C), resp  rate 18, weight 96 9 kg (213 lb 10 oz), SpO2 99 %  ,There is no height or weight on file to calculate BMI        Intake/Output Summary (Last 24 hours) at 11/15/18 1225  Last data filed at 11/15/18 1120   Gross per 24 hour   Intake              120 ml   Output             2800 ml   Net            -2680 ml       Physical Exam: General appearance: alert, appears stated age and cooperative  Head: Normocephalic, without obvious abnormality, atraumatic  Neck: no adenopathy, no carotid bruit, no JVD, supple, symmetrical, trachea midline and thyroid not enlarged, symmetric, no tenderness/mass/nodules  Lungs: clear to auscultation bilaterally  Heart: regular rate and rhythm, S1, S2 normal, no murmur, click, rub or gallop  Abdomen: soft, non-tender; bowel sounds normal; no masses,  no organomegaly  Extremities: extremities normal, warm and well-perfused; no cyanosis, clubbing, or edema  Pulses: 2+ and symmetric  Neurologic: Mental status: alertness: alert, orientation: person, place     Invasive Devices     Peripheral Intravenous Line            Peripheral IV 11/14/18 Right Antecubital 1 day              Lab Results   Component Value Date    WBC 6 61 11/14/2018    HGB 12 1 11/14/2018    HCT 36 3 (L) 11/14/2018    MCV 93 11/14/2018     (L) 11/14/2018     Lab Results   Component Value Date    GLUCOSE 114 02/24/2014    CALCIUM 8 1 (L) 11/15/2018     02/24/2014    K 4 8 11/15/2018    CO2 28 11/15/2018     11/15/2018    BUN 21 11/15/2018    CREATININE 1 15 11/15/2018       Lab, Imaging and other studies: I have personally reviewed pertinent reports

## 2018-11-15 NOTE — DISCHARGE INSTRUCTIONS
Urinary Retention:   Start Proscar 5 mg daily  Continue Flomax 0 4 mg daily   Avoid dehydration and constipation   Follow-up with your PCP    Visiting nurses will be out to see you at home

## 2018-11-15 NOTE — ASSESSMENT & PLAN NOTE
Creatinine on admission 1 42 -> 1 23 -> 1 15, likely in the setting of post-renal secondary to BPH  Improved with saleh catheter  Pt underwent successful voiding trial  · Follow-up with PCP   Wife will encourage oral intake and monitor for voiding/ constipation

## 2018-11-15 NOTE — TELEPHONE ENCOUNTER
Mr Tone Pascual is a 66-year-old male seen at [facility] for urinary retention  Please contact patient caregiver with hospital follow-up appointment date and time in approximately 1 month with Dr Laura Dawson  Thank you

## 2018-11-16 ENCOUNTER — HOSPITAL ENCOUNTER (EMERGENCY)
Facility: HOSPITAL | Age: 83
Discharge: HOME | End: 2018-11-16
Attending: EMERGENCY MEDICINE | Admitting: EMERGENCY MEDICINE
Payer: COMMERCIAL

## 2018-11-16 VITALS
BODY MASS INDEX: 25.77 KG/M2 | HEART RATE: 65 BPM | RESPIRATION RATE: 16 BRPM | TEMPERATURE: 98.1 F | WEIGHT: 207.23 LBS | SYSTOLIC BLOOD PRESSURE: 203 MMHG | DIASTOLIC BLOOD PRESSURE: 99 MMHG | OXYGEN SATURATION: 99 % | HEIGHT: 75 IN

## 2018-11-16 DIAGNOSIS — R31.0 GROSS HEMATURIA: Primary | ICD-10-CM

## 2018-11-16 LAB
ANION GAP SERPL CALCULATED.3IONS-SCNC: 5 MMOL/L (ref 4–13)
BACTERIA UR QL AUTO: ABNORMAL /HPF
BACTERIA UR QL AUTO: ABNORMAL /HPF
BASOPHILS # BLD AUTO: 0.03 THOUSANDS/ΜL (ref 0–0.1)
BASOPHILS NFR BLD AUTO: 0 % (ref 0–1)
BILIRUB UR QL STRIP: NEGATIVE
BILIRUB UR QL STRIP: NEGATIVE
BUN SERPL-MCNC: 21 MG/DL (ref 5–25)
CALCIUM SERPL-MCNC: 8.4 MG/DL (ref 8.3–10.1)
CHLORIDE SERPL-SCNC: 105 MMOL/L (ref 100–108)
CLARITY UR: ABNORMAL
CLARITY UR: CLEAR
CO2 SERPL-SCNC: 30 MMOL/L (ref 21–32)
COLOR UR: ABNORMAL
COLOR UR: YELLOW
CREAT SERPL-MCNC: 1.19 MG/DL (ref 0.6–1.3)
EOSINOPHIL # BLD AUTO: 0.07 THOUSAND/ΜL (ref 0–0.61)
EOSINOPHIL NFR BLD AUTO: 1 % (ref 0–6)
ERYTHROCYTE [DISTWIDTH] IN BLOOD BY AUTOMATED COUNT: 12.8 % (ref 11.6–15.1)
GFR SERPL CREATININE-BSD FRML MDRD: 53 ML/MIN/1.73SQ M
GLUCOSE SERPL-MCNC: 87 MG/DL (ref 65–140)
GLUCOSE UR STRIP-MCNC: NEGATIVE MG/DL
GLUCOSE UR STRIP-MCNC: NEGATIVE MG/DL
HCT VFR BLD AUTO: 42.8 % (ref 36.5–49.3)
HGB BLD-MCNC: 14.2 G/DL (ref 12–17)
HGB UR QL STRIP.AUTO: ABNORMAL
HGB UR QL STRIP.AUTO: ABNORMAL
IMM GRANULOCYTES # BLD AUTO: 0.04 THOUSAND/UL (ref 0–0.2)
IMM GRANULOCYTES NFR BLD AUTO: 1 % (ref 0–2)
KETONES UR STRIP-MCNC: NEGATIVE MG/DL
KETONES UR STRIP-MCNC: NEGATIVE MG/DL
LEUKOCYTE ESTERASE UR QL STRIP: ABNORMAL
LEUKOCYTE ESTERASE UR QL STRIP: NEGATIVE
LYMPHOCYTES # BLD AUTO: 1.5 THOUSANDS/ΜL (ref 0.6–4.47)
LYMPHOCYTES NFR BLD AUTO: 18 % (ref 14–44)
MCH RBC QN AUTO: 30.7 PG (ref 26.8–34.3)
MCHC RBC AUTO-ENTMCNC: 33.2 G/DL (ref 31.4–37.4)
MCV RBC AUTO: 92 FL (ref 82–98)
MONOCYTES # BLD AUTO: 0.79 THOUSAND/ΜL (ref 0.17–1.22)
MONOCYTES NFR BLD AUTO: 9 % (ref 4–12)
NEUTROPHILS # BLD AUTO: 6.07 THOUSANDS/ΜL (ref 1.85–7.62)
NEUTS SEG NFR BLD AUTO: 71 % (ref 43–75)
NITRITE UR QL STRIP: NEGATIVE
NITRITE UR QL STRIP: NEGATIVE
NON-SQ EPI CELLS URNS QL MICRO: ABNORMAL /HPF
NON-SQ EPI CELLS URNS QL MICRO: ABNORMAL /HPF
NRBC BLD AUTO-RTO: 0 /100 WBCS
PH UR STRIP.AUTO: 5.5 [PH] (ref 4.5–8)
PH UR STRIP.AUTO: 6 [PH] (ref 4.5–8)
PLATELET # BLD AUTO: 160 THOUSANDS/UL (ref 149–390)
PMV BLD AUTO: 11.3 FL (ref 8.9–12.7)
POTASSIUM SERPL-SCNC: 6 MMOL/L (ref 3.5–5.3)
PROT UR STRIP-MCNC: ABNORMAL MG/DL
PROT UR STRIP-MCNC: NEGATIVE MG/DL
RBC # BLD AUTO: 4.63 MILLION/UL (ref 3.88–5.62)
RBC #/AREA URNS AUTO: ABNORMAL /HPF
RBC #/AREA URNS AUTO: ABNORMAL /HPF
SODIUM SERPL-SCNC: 140 MMOL/L (ref 136–145)
SP GR UR STRIP.AUTO: 1.02 (ref 1–1.03)
SP GR UR STRIP.AUTO: <=1.005 (ref 1–1.03)
UROBILINOGEN UR QL STRIP.AUTO: 0.2 E.U./DL
UROBILINOGEN UR QL STRIP.AUTO: 0.2 E.U./DL
WBC # BLD AUTO: 8.5 THOUSAND/UL (ref 4.31–10.16)
WBC #/AREA URNS AUTO: ABNORMAL /HPF
WBC #/AREA URNS AUTO: ABNORMAL /HPF

## 2018-11-16 PROCEDURE — 81001 URINALYSIS AUTO W/SCOPE: CPT | Performed by: EMERGENCY MEDICINE

## 2018-11-16 PROCEDURE — 36415 COLL VENOUS BLD VENIPUNCTURE: CPT | Performed by: EMERGENCY MEDICINE

## 2018-11-16 PROCEDURE — 81001 URINALYSIS AUTO W/SCOPE: CPT

## 2018-11-16 PROCEDURE — 85025 COMPLETE CBC W/AUTO DIFF WBC: CPT | Performed by: EMERGENCY MEDICINE

## 2018-11-16 PROCEDURE — 80048 BASIC METABOLIC PNL TOTAL CA: CPT | Performed by: EMERGENCY MEDICINE

## 2018-11-16 PROCEDURE — 99283 EMERGENCY DEPT VISIT LOW MDM: CPT

## 2018-11-16 PROCEDURE — 87086 URINE CULTURE/COLONY COUNT: CPT | Performed by: EMERGENCY MEDICINE

## 2018-11-16 RX ORDER — LIDOCAINE HYDROCHLORIDE 20 MG/ML
JELLY TOPICAL ONCE
Status: COMPLETED | OUTPATIENT
Start: 2018-11-16 | End: 2018-11-16

## 2018-11-16 RX ADMIN — LIDOCAINE HYDROCHLORIDE: 20 JELLY TOPICAL at 00:06

## 2018-11-16 NOTE — ED PROVIDER NOTES
17        Sanjay Tovar  [address] [place] [address]          Patient:  Sanjay Tovar : 1970  MR#: [id number]      Dear Sanjay:    We have been unable to reach you by phone.  Please contact the office at your earliest convenience to discuss recent blood sugar results.      Sincerely,          GRACY Javier  [facility] [place] [address]  [address] [place] [address]  [phone]  [phone]   History  Chief Complaint   Patient presents with    Blood in Urine     Angela arrives to ED with complaint of blood in his fowley cath  Angela family states he has the cath due to an enlarged prostate so he can urinate  Angela family states that he has not had a bloody urine problem in the past but was recently put on flomax, finesteride  Patient is a 24-year-old male that presents for blood in his Ruelas catheter  He was recently seen yesterday p m  And discharge early this morning a m  Patient came in yesterday for acute urinary tension had a Ruelas catheter placed and discharged home  Wife and sister states that he had approximately 2 L of normal appearing urine draining from his Ruelas catheter today and then approximately 30-45 minutes ago with a saw red blood in the Ruelas bag and brought him to the ER  Patient states that he has bilateral lower pelvic pain at approximately the bladder  Denied any recent history of trauma to the catheter or falls, fever, chills, nausea, vomiting, blood in stool  Recently received Lovenox during his last admission but does not take blood thinners daily  MDM  Likely small bladder bleed versus prostate inflammation  Check basic blood work check kidney function, UA, monitor blood loss and urine output in catheter            Prior to Admission Medications   Prescriptions Last Dose Informant Patient Reported? Taking?    ALPRAZolam (XANAX) 0 25 mg tablet   Yes No   Sig: Take by mouth daily at bedtime as needed for anxiety   HYDROcodone-acetaminophen (VICODIN) 5-300 MG per tablet   Yes No   Sig: Take 1 tablet by mouth every 6 (six) hours as needed for moderate pain   bisacodyl (DULCOLAX) 10 mg suppository   No No   Sig: Insert 1 suppository (10 mg total) into the rectum daily as needed for constipation for up to 20 doses   cyanocobalamin (VITAMIN B-12) 100 mcg tablet   Yes No   Sig: Take by mouth daily   finasteride (PROSCAR) 5 mg tablet   No No   Sig: Take 1 tablet (5 mg total) by mouth daily   gabapentin (NEURONTIN) 300 mg capsule   No No   Sig: Take 1 capsule (300 mg total) by mouth 3 (three) times a day   senna (SENOKOT) 8 6 mg   No No   Sig: Take 1 tablet (8 6 mg total) by mouth daily at bedtime   sodium chloride 1 g tablet   Yes No   Sig: Take 2 g by mouth daily   tamsulosin (FLOMAX) 0 4 mg   Yes No   Sig: Take 0 4 mg by mouth        Facility-Administered Medications: None       Past Medical History:   Diagnosis Date    Gall stone     Hypotension     Throat cancer ([facility] [address] )     Urinary retention        Past Surgical History:   Procedure Laterality Date    TONSILLECTOMY         Family History   Problem Relation Age of Onset    No Known Problems Mother     No Known Problems Father     No Known Problems Sister     No Known Problems Brother     No Known Problems Son     No Known Problems Daughter     No Known Problems Maternal Grandmother     No Known Problems Maternal Grandfather     No Known Problems Paternal Grandmother     No Known Problems Paternal Grandfather     No Known Problems Cousin     Rheum arthritis Neg Hx     Osteoarthritis Neg Hx     Asthma Neg Hx     Diabetes Neg Hx     Heart failure Neg Hx     Hyperlipidemia Neg Hx     Hypertension Neg Hx     Migraines Neg Hx     Rashes / Skin problems Neg Hx     Seizures Neg Hx     Stroke Neg Hx     Thyroid disease Neg Hx      I have reviewed and agree with the history as documented  Social History   Substance Use Topics    Smoking status: Never Smoker    Smokeless tobacco: Never Used    Alcohol use No        Review of Systems   Constitutional: Negative for activity change, appetite change and fever  HENT: Negative for facial swelling  Respiratory: Negative for cough, choking, shortness of breath and wheezing  Cardiovascular: Negative for chest pain, palpitations and leg swelling  Gastrointestinal: Negative for abdominal pain, constipation, diarrhea, nausea and vomiting     Genitourinary: Positive for difficulty urinating  Negative for decreased urine volume, discharge, flank pain, frequency, penile swelling, scrotal swelling and testicular pain  Gross Blood in Ruelas catheter   Musculoskeletal: Negative for back pain and neck pain  Skin: Negative for color change  Allergic/Immunologic: Negative for immunocompromised state  Neurological: Negative for dizziness, syncope, facial asymmetry, light-headedness, numbness and headaches  Psychiatric/Behavioral: Negative for agitation, behavioral problems and confusion  Physical Exam  Physical Exam   Constitutional: He appears well-developed and well-nourished  HENT:   Head: Normocephalic and atraumatic  Nose: Nose normal    Eyes: Pupils are equal, round, and reactive to light  Conjunctivae and EOM are normal    Neck: Normal range of motion  Neck supple  Cardiovascular: Normal rate and regular rhythm  Abdominal: Soft  Bowel sounds are normal    Genitourinary: Penis normal  No penile tenderness  Genitourinary Comments: Gross blood in Ruelas catheter   Musculoskeletal: Normal range of motion  Neurological: He is alert  Demented   Skin: Skin is warm  Psychiatric: He has a normal mood and affect  Judgment normal    Nursing note and vitals reviewed        Vital Signs  ED Triage Vitals [11/16/18 1733]   Temperature Pulse Respirations Blood Pressure SpO2   98 1 °F (36 7 °C) 65 16 (!) 203/99 99 %      Temp Source Heart Rate Source Patient Position - Orthostatic VS BP Location FiO2 (%)   Oral Monitor Lying Right arm --      Pain Score       7           Vitals:    11/16/18 1733   BP: (!) 203/99   Pulse: 65   Patient Position - Orthostatic VS: Lying       Visual Acuity      ED Medications  Medications - No data to display    Diagnostic Studies  Results Reviewed     Procedure Component Value Units Date/Time    UA w Reflex to Microscopic w Reflex to Culture [[id number]]  (Abnormal) Collected:  11/16/18 9765    Lab Status:  Final result Specimen:  Urine from Urine, Indwelling Ruelas Catheter Updated:  11/16/18 1902     Color, UA Red     Clarity, UA Slightly Cloudy     Specific [place], UA 1 020     pH, UA 6 0     Leukocytes, UA Small (A)     Nitrite, UA Negative     Protein,  (2+) (A) mg/dl      Glucose, UA Negative mg/dl      Ketones, UA Negative mg/dl      Urobilinogen, UA 0 2 E U /dl      Bilirubin, UA Negative     Blood, UA Large (A)    Urine Microscopic [[id number]] Collected:  11/16/18 1835    Lab Status: In process Specimen:  Urine from Urine, Indwelling Ruelas Catheter Updated:  11/16/18 5546    Basic metabolic panel [[id number]]  (Abnormal) Collected:  11/16/18 1812    Lab Status:  Final result Specimen:  Blood from Arm, Left Updated:  11/16/18 1843     Sodium 140 mmol/L      Potassium 6 0 (H) mmol/L      Chloride 105 mmol/L      CO2 30 mmol/L      ANION GAP 5 mmol/L      BUN 21 mg/dL      Creatinine 1 19 mg/dL      Glucose 87 mg/dL      Calcium 8 4 mg/dL      eGFR 53 ml/min/1 73sq m     Narrative:         National Kidney Disease Education Program recommendations are as follows:  GFR calculation is accurate only with a steady state creatinine  Chronic Kidney disease less than 60 ml/min/1 73 sq  meters  Kidney failure less than 15 ml/min/1 73 sq  meters      CBC and differential [[id number]] Collected:  11/16/18 1812    Lab Status:  Final result Specimen:  Blood from Arm, Left Updated:  11/16/18 1821     WBC 8 50 Thousand/uL      RBC 4 63 Million/uL      Hemoglobin 14 2 g/dL      Hematocrit 42 8 %      MCV 92 fL      MCH 30 7 pg      MCHC 33 2 g/dL      RDW 12 8 %      MPV 11 3 fL      Platelets 980 Thousands/uL      nRBC 0 /100 WBCs      Neutrophils Relative 71 %      Immat GRANS % 1 %      Lymphocytes Relative 18 %      Monocytes Relative 9 %      Eosinophils Relative 1 %      Basophils Relative 0 %      Neutrophils Absolute 6 07 Thousands/µL      Immature Grans Absolute 0 04 Thousand/uL      Lymphocytes Absolute 1 50 Thousands/µL Monocytes Absolute 0 79 Thousand/µL      Eosinophils Absolute 0 07 Thousand/µL      Basophils Absolute 0 03 Thousands/µL                  No orders to display              Procedures  Procedures       Phone Contacts  ED Phone Contact    ED Course  ED Course as of Nov 16 1932 Fri Nov 16, 2018   8359 Spoke with patient and they are comfortable with discharge home he has appointment with PCP Monday and they are going to discuss removing the Ruelas                                MDM  Number of Diagnoses or Management Options  Gross hematuria: new and requires workup     Amount and/or Complexity of Data Reviewed  Clinical lab tests: ordered and reviewed  Tests in the medicine section of CPT®: ordered and reviewed  Discussion of test results with the performing providers: yes  Decide to obtain previous medical records or to obtain history from someone other than the patient: yes  Obtain history from someone other than the patient: yes  Review and summarize past medical records: yes  Discuss the patient with other providers: yes  Independent visualization of images, tracings, or specimens: yes      CritCare Time    Disposition  Final diagnoses:   Gross hematuria     Time reflects when diagnosis was documented in both MDM as applicable and the Disposition within this note     Time User Action Codes Description Comment    11/16/2018  7:27 PM Natalie Area Add [R31 0] Gross hematuria       ED Disposition     ED Disposition Condition Comment    Discharge  [facility] discharge to home/self care      Condition at discharge: Stable        Follow-up Information     Follow up With Specialties Details Why Contact Info Additional [address], DO Family Medicine   [address] Emergency Department Emergency Medicine  As needed, If symptoms worsen [address] [facility]  [facility] ED, Maya Carrillo  [address] [place], [place], [address]          Patient's Medications   Discharge Prescriptions    No medications on file     No discharge procedures on file      ED Provider  Electronically Signed by           Gus Handley PA-C  11/16/18 1924 Baltazar Raman PA-C  11/16/18 1014

## 2018-11-16 NOTE — DISCHARGE INSTRUCTIONS
Urinary Retention in Men   WHAT YOU NEED TO KNOW:   Urinary retention is a condition that develops when your bladder does not empty completely when you urinate  DISCHARGE INSTRUCTIONS:   Medicines:   · Medicines  can help decrease the size of your prostate, fight infection, and help you urinate more easily  · Take your medicine as directed  Contact your healthcare provider if you think your medicine is not helping or if you have side effects  Tell him or her if you are allergic to any medicine  Keep a list of the medicines, vitamins, and herbs you take  Include the amounts, and when and why you take them  Bring the list or the pill bottles to follow-up visits  Carry your medicine list with you in case of an emergency  Ruelas catheter care: You may need a Ruelas catheter for up to 2 weeks at home  Healthcare providers will give you a smaller leg bag to collect urine  Keep the bag below your waist  This will prevent urine from flowing back into your bladder and causing an infection or other problems  Also, keep the tube free of kinks so the urine will drain properly  Do not pull on the catheter  This can cause pain and bleeding, and may cause the catheter to come out  Ask your healthcare provider or urologist for more information on Ruelas catheter care  Urinate regularly:  When your catheter is removed, do not let your bladder become too full before you urinate  Set regular times each day to urinate  Urinate as soon as you feel the need or at least every 3 hours while you are awake  Do not drink liquids before you go to bed  Urinate right before you go to bed  Follow up with your healthcare provider or urologist as directed:  Write down your questions so you remember to ask them during your visits  Contact your healthcare provider or urologist if:   · You have a fever  · You have pain when you urinate  · You have blood in your urine  · You have problems with your catheter      · You have questions or concerns about your condition or care  Return to the emergency department if:   · You have severe abdominal pain  · You are breathing faster than usual     · Your heartbeat is faster than usual     · Your face, hands, feet, or ankles are swollen  © 2017 [address] [employer] Information is for End User's use only and may not be sold, redistributed or otherwise used for commercial purposes  All illustrations and images included in CareNotes® are the copyrighted property of A D A M , [employer]  or Nuno Singh  The above information is an  only  It is not intended as medical advice for individual conditions or treatments  Talk to your doctor, nurse or pharmacist before following any medical regimen to see if it is safe and effective for you  Urinary Leg Bag   WHAT YOU NEED TO KNOW:   What is a urinary leg bag? A urinary leg bag holds urine that drains from your catheter  It fits under your clothes and allows you to do your normal daily activities  How do I use a urinary leg bag? · Wash your hands  before and after you touch your catheter, tubing, or drainage bag  Use soap and water  This reduces the risk of infection  · Strap your leg bag to your thigh or calf  Make sure the straps are comfortable  The straps can cause problems with blood flow in your leg if they are too tight  · Clean the tip of the drainage tube with alcohol  before attaching it to your catheter  This helps prevent bacteria from getting into your catheter  · The connecting tube should not pull on your catheter  Skin breakdown can occur if there is constant pulling on the catheter  · Check the tube often to make sure it is not kinked or twisted  Blockage in the tube can cause urine to back up into your bladder  Your urine must flow straight through the tube into your leg bag  · Always keep the leg bag below your bladder    This prevents urine from the bag going back into your bladder, which may cause an infection  · Empty your leg bag when it is ½ full, or every 3 hours  A full bag may break or disconnect from the catheter  · Change to your bedside bag before you go to bed  Your bedside bag can hold more urine  Do not use your leg bag at night because it could become too full or break  · Clean your leg bag after every use  Fill the bag with 2 parts vinegar and 3 parts water  Let it soak for 20 minutes, then rinse and let dry  Follow your healthcare provider's instruction on replacing your leg bag with a new one  CARE AGREEMENT:   You have the right to help plan your care  Learn about your health condition and how it may be treated  Discuss treatment options with your caregivers to decide what care you want to receive  You always have the right to refuse treatment  The above information is an  only  It is not intended as medical advice for individual conditions or treatments  Talk to your doctor, nurse or pharmacist before following any medical regimen to see if it is safe and effective for you  © 2017 [address] [facility] Information is for End User's use only and may not be sold, redistributed or otherwise used for commercial purposes  All illustrations and images included in CareNotes® are the copyrighted property of A D A M , Inc  or [employer]  Ruelas Catheter Placement and Care   WHAT YOU NEED TO KNOW:   A Ruelas catheter is a sterile tube that is inserted into your bladder to drain urine  It is also called an indwelling urinary catheter  The tip of the catheter has a small balloon filled with solution that holds the catheter inside your bladder  DISCHARGE INSTRUCTIONS:   Return to the emergency department if:   · Your catheter comes out  · You suddenly have material that looks like sand in the tubing or drainage bag  · No urine is draining into the bag and you have checked the system      · You have pain in your hip, back, pelvis, or lower abdomen  · You are confused or cannot think clearly  Contact your healthcare provider if:   · You have a fever  · You have bladder spasms for more than 1 day after the catheter is placed  · You see blood in the tubing or drainage bag  · You have a rash or itching where the catheter tube is secured to your skin  · Urine leaks from or around the catheter, tubing, or drainage bag  · The closed drainage system has accidently come open or apart  · You see a layer of crystals inside the tubing  · You have questions or concerns about your condition or care  Care for your Ruelas catheter:   · Clean your genital area 2 times every day  Clean your catheter and the area around where it was inserted  Use soap and water  Clean your anal opening and catheter area after every bowel movement  · Secure the catheter tube  so you do not pull or move the catheter  This helps prevent pain and bladder spasms  Healthcare providers will show you how to use medical tape or a strap to secure the catheter tube to your body  · Keep a closed drainage system  Your Ruelas catheter should always be attached to the drainage bag to form a closed system  Do not disconnect any part of the closed system unless you need to change the bag  Care for your drainage bag:   · Ask if a leg bag is right for you  A leg bag can be worn under your clothes  Ask your healthcare provider for more information about a leg bag  · Keep the drainage bag below the level of your waist   This helps stop urine from moving back up the tubing and into your bladder  Do not loop or kink the tubing  This can cause urine to back up and collect in your bladder  Do not let the drainage bag touch or lie on the floor  · Empty the drainage bag when needed  The weight of a full drainage bag can be painful  Empty the drainage bag every 3 to 6 hours or when it is ? full       · Clean and change the drainage bag as directed  Ask your healthcare provider how often you should change the drainage bag and what cleaning solution to use  Wear disposable gloves when you change the bag  Do not allow the end of the catheter or tubing to touch anything  Clean the ends with an alcohol pad before you reconnect them  What to do if problems develop:   · No urine is draining into the bag:      ¨ Check for kinks in the tubing and straighten them out  ¨ Check the tape or strap used to secure the catheter tube to your skin  Make sure it is not blocking the tube  ¨ Make sure you are not sitting or lying on the tubing  ¨ Make sure the urine bag is hanging below the level of your waist     · Urine leaks from or around the catheter, tubing, or drainage bag:  Check if the closed drainage system has accidently come open or apart  Clean the catheter and tubing ends with a new alcohol pad and reconnect them  Prevent an infection:   · Wash your hands often  Wash before and after you touch your catheter, tubing, or drainage bag  Use soap and water  Wear clean disposable gloves when you care for your catheter or disconnect the drainage bag  Wash your hands before you prepare or eat food  · Drink liquids as directed  Ask your healthcare provider how much liquid to drink each day and which liquids are best for you  Liquids will help flush your kidneys and bladder to help prevent infection  Follow up with your healthcare provider as directed:  Write down your questions so you remember to ask them during your visits  © 2017 [address] [employer] Information is for End User's use only and may not be sold, redistributed or otherwise used for commercial purposes  All illustrations and images included in CareNotes® are the copyrighted property of A [employer] M [employer] Inc  or Nuno Singh  The above information is an  only  It is not intended as medical advice for individual conditions or treatments   Talk to your doctor, nurse or pharmacist before following any medical regimen to see if it is safe and effective for you

## 2018-11-16 NOTE — ED PROVIDER NOTES
History  Chief Complaint   Patient presents with    Difficulty Urinating     C/O of painful urination and difficulty urinating  Was seen in ER fpr the past two days, tuesday and wednesday  Pt was discharged from [facility] this morning, was admitted for painful/difficulty urinating  Pt did have a catheter when admitted and then removed   Painful Urination     Patient is a 80year old male with lower abdominal pain and burning pain in his penis since last night  Unable to urinate since last night and dysuria  Was last seen in this ED on 11/14/18 for urinary retention and had saleh catheter removed prior to his discharge from the hospital  Patient has dementia  No vomiting or diarrhea  No fever  [facility] website checked on this patient and last Rx filled was on 10/30/18 for vicodin for 40 day supply  No abdominal surgery  History provided by:  Patient, EMS personnel and relative (daughter)  History limited by:  Dementia   used: No    Difficulty Urinating   Presenting symptoms: penile pain    Associated symptoms: abdominal pain    Associated symptoms: no diarrhea, no fever and no vomiting        Prior to Admission Medications   Prescriptions Last Dose Informant Patient Reported? Taking?    ALPRAZolam (XANAX) 0 25 mg tablet   Yes Yes   Sig: Take by mouth daily at bedtime as needed for anxiety   HYDROcodone-acetaminophen (VICODIN) 5-300 MG per tablet   Yes Yes   Sig: Take 1 tablet by mouth every 6 (six) hours as needed for moderate pain   bisacodyl (DULCOLAX) 10 mg suppository   No Yes   Sig: Insert 1 suppository (10 mg total) into the rectum daily as needed for constipation for up to 20 doses   cyanocobalamin (VITAMIN B-12) 100 mcg tablet   Yes Yes   Sig: Take by mouth daily   finasteride (PROSCAR) 5 mg tablet   No Yes   Sig: Take 1 tablet (5 mg total) by mouth daily   gabapentin (NEURONTIN) 300 mg capsule   No Yes   Sig: Take 1 capsule (300 mg total) by mouth 3 (three) times a day   senna (SENOKOT) 8 6 mg   No Yes   Sig: Take 1 tablet (8 6 mg total) by mouth daily at bedtime   sodium chloride 1 g tablet   Yes Yes   Sig: Take 2 g by mouth daily   tamsulosin (FLOMAX) 0 4 mg   Yes Yes   Sig: Take 0 4 mg by mouth        Facility-Administered Medications: None       Past Medical History:   Diagnosis Date    Gall stone     Hypotension     Throat cancer ([address] )     Urinary retention        Past Surgical History:   Procedure Laterality Date    TONSILLECTOMY         Family History   Problem Relation Age of Onset    No Known Problems Mother     No Known Problems Father     No Known Problems Sister     No Known Problems Brother     No Known Problems Son     No Known Problems Daughter     No Known Problems Maternal Grandmother     No Known Problems Maternal Grandfather     No Known Problems Paternal Grandmother     No Known Problems Paternal Grandfather     No Known Problems Cousin     Rheum arthritis Neg Hx     Osteoarthritis Neg Hx     Asthma Neg Hx     Diabetes Neg Hx     Heart failure Neg Hx     Hyperlipidemia Neg Hx     Hypertension Neg Hx     Migraines Neg Hx     Rashes / Skin problems Neg Hx     Seizures Neg Hx     Stroke Neg Hx     Thyroid disease Neg Hx      I have reviewed and agree with the history as documented  Social History   Substance Use Topics    Smoking status: Never Smoker    Smokeless tobacco: Never Used    Alcohol use No        Review of Systems   Constitutional: Negative for fever  Gastrointestinal: Positive for abdominal pain  Negative for diarrhea and vomiting  Genitourinary: Positive for difficulty urinating and penile pain  Psychiatric/Behavioral: Positive for confusion (dementia)  All other systems reviewed and are negative  Physical Exam  Physical Exam   Constitutional: He appears well-developed and well-nourished  He appears distressed (moderate)  HENT:   Head: Normocephalic and atraumatic     Mucous membranes moist     Eyes: No scleral icterus  Neck: No tracheal deviation present  Cardiovascular: Normal rate, regular rhythm and normal heart sounds  No murmur heard  Pulmonary/Chest: Effort normal and breath sounds normal  No stridor  No respiratory distress  Abdominal: Soft  Bowel sounds are normal  He exhibits distension (suprapubic)  There is tenderness (suprapubic)  There is no rebound and no guarding  Musculoskeletal: He exhibits no edema or deformity  Neurological:   Awake, confused  No gross focal deficits  Skin: Skin is warm and dry  No rash noted  Psychiatric: He has a normal mood and affect  Nursing note and vitals reviewed        Vital Signs  ED Triage Vitals [11/15/18 2352]   Temperature Pulse Respirations Blood Pressure SpO2   98 3 °F (36 8 °C) 61 18 157/67 97 %      Temp Source Heart Rate Source Patient Position - Orthostatic VS BP Location FiO2 (%)   Oral Monitor Lying Left arm --      Pain Score       7           Vitals:    11/15/18 2352   BP: 157/67   Pulse: 61   Patient Position - Orthostatic VS: Lying       Visual Acuity      ED Medications  Medications   lidocaine (URO-JET) 2 % topical gel ( Topical Given 11/16/18 0006)       Diagnostic Studies  Results Reviewed     Procedure Component Value Units Date/Time    Urine Microscopic [[id number]]  (Abnormal) Collected:  11/16/18 0100    Lab Status:  Final result Specimen:  Urine from Urine, Indwelling Ruelas Catheter Updated:  11/16/18 0117     RBC, UA 0-1 (A) /hpf      WBC, UA None Seen /hpf      Epithelial Cells None Seen /hpf      Bacteria, UA None Seen /hpf     ED Urine Macroscopic [[id number]]  (Abnormal) Collected:  11/16/18 0100    Lab Status:  Final result Specimen:  Urine Updated:  11/16/18 0104     Color, UA Yellow     Clarity, UA Clear     pH, UA 5 5     Leukocytes, UA Negative     Nitrite, UA Negative     Protein, UA Negative mg/dl      Glucose, UA Negative mg/dl      Ketones, UA Negative mg/dl      Urobilinogen, UA 0 2 E U /dl      Bilirubin, UA Negative Blood, UA Trace (A)     Specific [place], UA <=1 005    Narrative:       CLINITEK RESULT                 No orders to display              Procedures  Procedures       Phone Contacts  ED Phone Contact    ED Course  ED Course as of Nov 16 0126 Fri Nov 16, 2018   0123 UA d/w daughter  Patient sleeping  900 mL of urine obtained after saleh placed  Abdomen not distended and nontender  [facility]  Number of Diagnoses or Management Options  Diagnosis management comments: DDX including but not limited to: urinary retention, reaction to anesthesia, BPH, hematuria, UTI, tumor, neurologic etiology  Amount and/or Complexity of Data Reviewed  Clinical lab tests: ordered and reviewed  Decide to obtain previous medical records or to obtain history from someone other than the patient: yes  Obtain history from someone other than the patient: yes  Review and summarize past medical records: yes      CritCare Time    Disposition  Final diagnoses:   Acute urinary retention     Time reflects when diagnosis was documented in both MDM as applicable and the Disposition within this note     Time User Action Codes Description Comment    11/16/2018  1:24 AM Jackeline Colorado [R33 8] Acute urinary retention       ED Disposition     ED Disposition Condition Comment    Discharge  [address] discharge to home/self care  Condition at discharge: Stable        Follow-up Information     Follow up With Specialties Details Why Contact Info Additional [address] [facility] [place] [facility] [place] [facility] Call in 1 day Return sooner if increased pain, fever, vomiting, bleeding  Jeff Smith [address]  [address] [place] [address] [facility] [place] [facility] [place], [address] [place] [address] [facility] [place], [place], [phone]          Patient's Medications   Discharge Prescriptions    No medications on file     No discharge procedures on file      ED Provider  Electronically Signed by           Loco De Oliveira MD  11/16/18 6153

## 2018-11-16 NOTE — TELEPHONE ENCOUNTER
Solitario Rodriguez from [facility] called and said that patient was in the ER last night and had saleh placed, and was to call today for orders  Please advise the [facility]

## 2018-11-16 NOTE — TELEPHONE ENCOUNTER
Called and spoke to Carolina Malhotra from [place],  Verbal order given to maintain saleh catheter to drainage, irrigate as needed for blood, sediment, clogging, change as needed  Patient scheduled for NP appt with Dr Christian Deleon on 12/18/18 at 8:15 am [facility]

## 2018-11-16 NOTE — ED ATTENDING ATTESTATION
I, Arvind Smith DO, saw and evaluated the patient  I have discussed the patient with the resident/non-physician practitioner and agree with the resident's/non-physician practitioner's findings, Plan of Care, and MDM as documented in the resident's/non-physician practitioner's note, except where noted  All available labs and Radiology studies were reviewed  At this point I agree with the current assessment done in the Emergency Department  I have conducted an independent evaluation of this patient a history and physical is as follows:      Critical Care Time  CritCare Time    Procedures     12-year-old male presents with hematuria patient recently seen here by myself retention, was discharged and upcoming back due to recurrent retention had to have a Ruelas placed was removed upon discharge as patient was admitted, was seen here again overnight and Ruelas was replaced  Urine did not show evidence of UTI  , patient drain 2 L of straw-colored urine throughout the day today  , few hours ago a turned blood tinged prompting family to be very concerned bring patient here for evaluation  Patient himself has no complaints  No modifying or alleviating factors  On examination patient does have clear clot free blood tinged urine  No abdominal tenderness  No CVA tenderness    Long discussion with family, will check hemoglobin will check creatinine will recheck urine

## 2018-11-17 NOTE — DISCHARGE INSTRUCTIONS
Acute Hematuria   WHAT YOU SHOULD KNOW:   Hematuria is blood in your urine from an injury or medical condition  Acute means the problem starts suddenly, worsens quickly, and lasts a short time  Your urine may be bright red to dark brown  Some common causes of hematuria are bladder infection, kidney stone, trauma to the kidneys or bladder, and some medications  Sometimes blood clots can block the urethra so that you cannot empty your bladder  AFTER YOU LEAVE:   Medicines:  Ask about these or other medicines you may need to treat the cause of your acute hematuria:  · Antibiotics: This medicine is given to fight or prevent an infection caused by bacteria  Always take your antibiotics exactly as ordered by your healthcare provider  Do not stop taking your medicine unless directed by your healthcare provider  Never save antibiotics or take leftover antibiotics that were given to you for another illness  · Take your medicine as directed  Call your healthcare provider if you think your medicine is not helping or if you have side effects  Tell him if you are allergic to any medicine  Keep a list of the medicines, vitamins, and herbs you take  Include the amounts, and when and why you take them  Bring the list or the pill bottles to follow-up visits  Carry your medicine list with you in case of an emergency  Increase the amount of fluid you drink:  Drink clear fluids to help flush the blood from your urinary tract  Follow instructions about how much fluid to drink  Follow up with your healthcare provider as directed: Your healthcare provider will tell you how often to come in for follow-up visits  He may refer you to a specialist, such as a urologist or nephrologist  The specialists may do tests or procedures to find more serious problems with your urinary system  Write down your questions so you remember to ask them during your visits     Contact your healthcare provider if:   · You have a fever that gets worse or does not go away with treatment  · You cannot keep liquids or medicines down  · Your urine gets darker, even after you drink extra liquids  · You have questions or concerns about your condition, treatment, or care  Seek care immediately or call 911 if:   · You have blood in your urine after a new injury, such as a fall  · You are urinating very small amounts or not at all  · You feel like you cannot empty your bladder  · You have severe back or side pain that does not go away with treatment  © 2014 [employer] is for End User's use only and may not be sold, redistributed or otherwise used for commercial purposes  All illustrations and images included in CareNotes® are the copyrighted property of [employer]  or Nuno Singh  The above information is an  only  It is not intended as medical advice for individual conditions or treatments  Talk to your doctor, nurse or pharmacist before following any medical regimen to see if it is safe and effective for you

## 2018-11-18 LAB — BACTERIA UR CULT: NORMAL

## 2018-11-19 NOTE — TELEPHONE ENCOUNTER
Called and spoke to wife, patient was seen by primary care physician this morning and had his saleh removed, he was instructed by PCP to contact his primary care office if unable to urinate by 7 pm tonight  Per spouse his primary care has office hours until 9 pm tonight and patient would be evaluated if unable to urinate by his PCP  Wife states she will call office back if needed

## 2018-11-20 ENCOUNTER — HOSPITAL ENCOUNTER (INPATIENT)
Facility: HOSPITAL | Age: 83
LOS: 7 days | Discharge: NURSING, REHAB OR LONG-TERM CARE | DRG: 726 | End: 2018-11-28
Attending: EMERGENCY MEDICINE | Admitting: INTERNAL MEDICINE
Payer: COMMERCIAL

## 2018-11-20 DIAGNOSIS — R33.9 URINARY RETENTION: ICD-10-CM

## 2018-11-20 DIAGNOSIS — N17.9 ACUTE-ON-CHRONIC KIDNEY INJURY (HCC): Primary | ICD-10-CM

## 2018-11-20 DIAGNOSIS — N18.9 ACUTE-ON-CHRONIC KIDNEY INJURY (HCC): Primary | ICD-10-CM

## 2018-11-20 DIAGNOSIS — F03.90 DEMENTIA WITHOUT BEHAVIORAL DISTURBANCE, UNSPECIFIED DEMENTIA TYPE (HCC): ICD-10-CM

## 2018-11-20 DIAGNOSIS — R31.29 MICROSCOPIC HEMATURIA: ICD-10-CM

## 2018-11-20 DIAGNOSIS — K80.20 GALLSTONE: ICD-10-CM

## 2018-11-20 PROCEDURE — 99285 EMERGENCY DEPT VISIT HI MDM: CPT

## 2018-11-21 ENCOUNTER — APPOINTMENT (EMERGENCY)
Dept: RADIOLOGY | Facility: HOSPITAL | Age: 83
DRG: 726 | End: 2018-11-21
Payer: COMMERCIAL

## 2018-11-21 ENCOUNTER — APPOINTMENT (OUTPATIENT)
Dept: ULTRASOUND IMAGING | Facility: HOSPITAL | Age: 83
DRG: 726 | End: 2018-11-21
Payer: COMMERCIAL

## 2018-11-21 ENCOUNTER — APPOINTMENT (EMERGENCY)
Dept: CT IMAGING | Facility: HOSPITAL | Age: 83
DRG: 726 | End: 2018-11-21
Payer: COMMERCIAL

## 2018-11-21 ENCOUNTER — TELEPHONE (OUTPATIENT)
Dept: OTHER | Facility: HOSPITAL | Age: 83
End: 2018-11-21

## 2018-11-21 PROBLEM — N18.9 ACUTE-ON-CHRONIC KIDNEY INJURY (HCC): Status: ACTIVE | Noted: 2018-11-21

## 2018-11-21 PROBLEM — R31.29 MICROSCOPIC HEMATURIA: Status: ACTIVE | Noted: 2018-11-21

## 2018-11-21 PROBLEM — N17.9 ACUTE-ON-CHRONIC KIDNEY INJURY (HCC): Status: ACTIVE | Noted: 2018-11-21

## 2018-11-21 LAB
ABO GROUP BLD: NORMAL
ALBUMIN SERPL BCP-MCNC: 3 G/DL (ref 3.5–5)
ALP SERPL-CCNC: 248 U/L (ref 46–116)
ALT SERPL W P-5'-P-CCNC: 17 U/L (ref 12–78)
ANION GAP SERPL CALCULATED.3IONS-SCNC: 5 MMOL/L (ref 4–13)
ANION GAP SERPL CALCULATED.3IONS-SCNC: 7 MMOL/L (ref 4–13)
APTT PPP: 30 SECONDS (ref 26–38)
AST SERPL W P-5'-P-CCNC: 13 U/L (ref 5–45)
BACTERIA UR QL AUTO: ABNORMAL /HPF
BASOPHILS # BLD AUTO: 0.04 THOUSANDS/ΜL (ref 0–0.1)
BASOPHILS NFR BLD AUTO: 1 % (ref 0–1)
BILIRUB SERPL-MCNC: 0.6 MG/DL (ref 0.2–1)
BILIRUB UR QL STRIP: NEGATIVE
BLD GP AB SCN SERPL QL: NEGATIVE
BUN SERPL-MCNC: 20 MG/DL (ref 5–25)
BUN SERPL-MCNC: 24 MG/DL (ref 5–25)
CALCIUM SERPL-MCNC: 8.2 MG/DL (ref 8.3–10.1)
CALCIUM SERPL-MCNC: 8.4 MG/DL (ref 8.3–10.1)
CHLORIDE SERPL-SCNC: 105 MMOL/L (ref 100–108)
CHLORIDE SERPL-SCNC: 107 MMOL/L (ref 100–108)
CLARITY UR: CLEAR
CO2 SERPL-SCNC: 27 MMOL/L (ref 21–32)
CO2 SERPL-SCNC: 29 MMOL/L (ref 21–32)
COLOR UR: YELLOW
CREAT SERPL-MCNC: 1.2 MG/DL (ref 0.6–1.3)
CREAT SERPL-MCNC: 1.42 MG/DL (ref 0.6–1.3)
EOSINOPHIL # BLD AUTO: 0.17 THOUSAND/ΜL (ref 0–0.61)
EOSINOPHIL NFR BLD AUTO: 3 % (ref 0–6)
ERYTHROCYTE [DISTWIDTH] IN BLOOD BY AUTOMATED COUNT: 12.6 % (ref 11.6–15.1)
GFR SERPL CREATININE-BSD FRML MDRD: 43 ML/MIN/1.73SQ M
GFR SERPL CREATININE-BSD FRML MDRD: 52 ML/MIN/1.73SQ M
GLUCOSE SERPL-MCNC: 94 MG/DL (ref 65–140)
GLUCOSE SERPL-MCNC: 97 MG/DL (ref 65–140)
GLUCOSE UR STRIP-MCNC: NEGATIVE MG/DL
HCT VFR BLD AUTO: 40.4 % (ref 36.5–49.3)
HGB BLD-MCNC: 13.4 G/DL (ref 12–17)
HGB UR QL STRIP.AUTO: ABNORMAL
IMM GRANULOCYTES # BLD AUTO: 0.04 THOUSAND/UL (ref 0–0.2)
IMM GRANULOCYTES NFR BLD AUTO: 1 % (ref 0–2)
INR PPP: 1.03 (ref 0.86–1.17)
KETONES UR STRIP-MCNC: NEGATIVE MG/DL
LEUKOCYTE ESTERASE UR QL STRIP: NEGATIVE
LYMPHOCYTES # BLD AUTO: 2.33 THOUSANDS/ΜL (ref 0.6–4.47)
LYMPHOCYTES NFR BLD AUTO: 34 % (ref 14–44)
MCH RBC QN AUTO: 30.9 PG (ref 26.8–34.3)
MCHC RBC AUTO-ENTMCNC: 33.2 G/DL (ref 31.4–37.4)
MCV RBC AUTO: 93 FL (ref 82–98)
MONOCYTES # BLD AUTO: 0.59 THOUSAND/ΜL (ref 0.17–1.22)
MONOCYTES NFR BLD AUTO: 9 % (ref 4–12)
NEUTROPHILS # BLD AUTO: 3.66 THOUSANDS/ΜL (ref 1.85–7.62)
NEUTS SEG NFR BLD AUTO: 52 % (ref 43–75)
NITRITE UR QL STRIP: NEGATIVE
NON-SQ EPI CELLS URNS QL MICRO: ABNORMAL /HPF
NRBC BLD AUTO-RTO: 0 /100 WBCS
PH UR STRIP.AUTO: 5.5 [PH] (ref 4.5–8)
PLATELET # BLD AUTO: 187 THOUSANDS/UL (ref 149–390)
PMV BLD AUTO: 11.2 FL (ref 8.9–12.7)
POTASSIUM SERPL-SCNC: 4.5 MMOL/L (ref 3.5–5.3)
POTASSIUM SERPL-SCNC: 4.7 MMOL/L (ref 3.5–5.3)
PROT SERPL-MCNC: 6.6 G/DL (ref 6.4–8.2)
PROT UR STRIP-MCNC: NEGATIVE MG/DL
PROTHROMBIN TIME: 13.2 SECONDS (ref 11.8–14.2)
RBC # BLD AUTO: 4.33 MILLION/UL (ref 3.88–5.62)
RBC #/AREA URNS AUTO: ABNORMAL /HPF
RH BLD: POSITIVE
SODIUM SERPL-SCNC: 139 MMOL/L (ref 136–145)
SODIUM SERPL-SCNC: 141 MMOL/L (ref 136–145)
SP GR UR STRIP.AUTO: 1.02 (ref 1–1.03)
SPECIMEN EXPIRATION DATE: NORMAL
UROBILINOGEN UR QL STRIP.AUTO: 0.2 E.U./DL
WBC # BLD AUTO: 6.83 THOUSAND/UL (ref 4.31–10.16)
WBC #/AREA URNS AUTO: ABNORMAL /HPF

## 2018-11-21 PROCEDURE — 76857 US EXAM PELVIC LIMITED: CPT

## 2018-11-21 PROCEDURE — 86900 BLOOD TYPING SEROLOGIC ABO: CPT | Performed by: EMERGENCY MEDICINE

## 2018-11-21 PROCEDURE — 74178 CT ABD&PLV WO CNTR FLWD CNTR: CPT

## 2018-11-21 PROCEDURE — 81001 URINALYSIS AUTO W/SCOPE: CPT | Performed by: EMERGENCY MEDICINE

## 2018-11-21 PROCEDURE — 99222 1ST HOSP IP/OBS MODERATE 55: CPT | Performed by: NURSE PRACTITIONER

## 2018-11-21 PROCEDURE — 0T9B70Z DRAINAGE OF BLADDER WITH DRAINAGE DEVICE, VIA NATURAL OR ARTIFICIAL OPENING: ICD-10-PCS | Performed by: EMERGENCY MEDICINE

## 2018-11-21 PROCEDURE — 86901 BLOOD TYPING SEROLOGIC RH(D): CPT | Performed by: EMERGENCY MEDICINE

## 2018-11-21 PROCEDURE — 93005 ELECTROCARDIOGRAM TRACING: CPT

## 2018-11-21 PROCEDURE — 85025 COMPLETE CBC W/AUTO DIFF WBC: CPT | Performed by: EMERGENCY MEDICINE

## 2018-11-21 PROCEDURE — 36415 COLL VENOUS BLD VENIPUNCTURE: CPT | Performed by: EMERGENCY MEDICINE

## 2018-11-21 PROCEDURE — 96361 HYDRATE IV INFUSION ADD-ON: CPT

## 2018-11-21 PROCEDURE — 99219 PR INITIAL OBSERVATION CARE/DAY 50 MINUTES: CPT | Performed by: INTERNAL MEDICINE

## 2018-11-21 PROCEDURE — 86850 RBC ANTIBODY SCREEN: CPT | Performed by: EMERGENCY MEDICINE

## 2018-11-21 PROCEDURE — 85730 THROMBOPLASTIN TIME PARTIAL: CPT | Performed by: EMERGENCY MEDICINE

## 2018-11-21 PROCEDURE — 96360 HYDRATION IV INFUSION INIT: CPT

## 2018-11-21 PROCEDURE — 80053 COMPREHEN METABOLIC PANEL: CPT | Performed by: EMERGENCY MEDICINE

## 2018-11-21 PROCEDURE — 80048 BASIC METABOLIC PNL TOTAL CA: CPT | Performed by: PHYSICIAN ASSISTANT

## 2018-11-21 PROCEDURE — 71045 X-RAY EXAM CHEST 1 VIEW: CPT

## 2018-11-21 PROCEDURE — 85610 PROTHROMBIN TIME: CPT | Performed by: EMERGENCY MEDICINE

## 2018-11-21 RX ORDER — ALPRAZOLAM 0.25 MG/1
0.25 TABLET ORAL ONCE
Status: COMPLETED | OUTPATIENT
Start: 2018-11-21 | End: 2018-11-21

## 2018-11-21 RX ORDER — FINASTERIDE 5 MG/1
5 TABLET, FILM COATED ORAL DAILY
Status: DISCONTINUED | OUTPATIENT
Start: 2018-11-21 | End: 2018-11-28 | Stop reason: HOSPADM

## 2018-11-21 RX ORDER — LIDOCAINE HYDROCHLORIDE 20 MG/ML
JELLY TOPICAL ONCE
Status: COMPLETED | OUTPATIENT
Start: 2018-11-21 | End: 2018-11-21

## 2018-11-21 RX ORDER — SODIUM CHLORIDE 1000 MG
2 TABLET, SOLUBLE MISCELLANEOUS DAILY
Status: DISCONTINUED | OUTPATIENT
Start: 2018-11-21 | End: 2018-11-28 | Stop reason: HOSPADM

## 2018-11-21 RX ORDER — SENNOSIDES 8.6 MG
1 TABLET ORAL
Status: DISCONTINUED | OUTPATIENT
Start: 2018-11-21 | End: 2018-11-28 | Stop reason: HOSPADM

## 2018-11-21 RX ORDER — HYDRALAZINE HYDROCHLORIDE 20 MG/ML
5 INJECTION INTRAMUSCULAR; INTRAVENOUS EVERY 6 HOURS PRN
Status: DISCONTINUED | OUTPATIENT
Start: 2018-11-21 | End: 2018-11-28 | Stop reason: HOSPADM

## 2018-11-21 RX ORDER — BISACODYL 10 MG
10 SUPPOSITORY, RECTAL RECTAL DAILY PRN
Status: DISCONTINUED | OUTPATIENT
Start: 2018-11-21 | End: 2018-11-28 | Stop reason: HOSPADM

## 2018-11-21 RX ORDER — UBIDECARENONE 75 MG
100 CAPSULE ORAL DAILY
Status: DISCONTINUED | OUTPATIENT
Start: 2018-11-21 | End: 2018-11-28 | Stop reason: HOSPADM

## 2018-11-21 RX ORDER — AMLODIPINE BESYLATE 5 MG/1
5 TABLET ORAL DAILY
Status: DISCONTINUED | OUTPATIENT
Start: 2018-11-21 | End: 2018-11-25

## 2018-11-21 RX ORDER — ACETAMINOPHEN 325 MG/1
650 TABLET ORAL EVERY 6 HOURS PRN
Status: DISCONTINUED | OUTPATIENT
Start: 2018-11-21 | End: 2018-11-28 | Stop reason: HOSPADM

## 2018-11-21 RX ORDER — GABAPENTIN 300 MG/1
300 CAPSULE ORAL 3 TIMES DAILY
Status: DISCONTINUED | OUTPATIENT
Start: 2018-11-21 | End: 2018-11-28 | Stop reason: HOSPADM

## 2018-11-21 RX ORDER — ALPRAZOLAM 0.25 MG/1
0.25 TABLET ORAL
Status: DISCONTINUED | OUTPATIENT
Start: 2018-11-21 | End: 2018-11-24

## 2018-11-21 RX ORDER — SODIUM CHLORIDE 9 MG/ML
125 INJECTION, SOLUTION INTRAVENOUS CONTINUOUS
Status: DISCONTINUED | OUTPATIENT
Start: 2018-11-21 | End: 2018-11-21

## 2018-11-21 RX ORDER — TRAMADOL HYDROCHLORIDE 50 MG/1
50 TABLET ORAL 2 TIMES DAILY PRN
Status: DISCONTINUED | OUTPATIENT
Start: 2018-11-21 | End: 2018-11-28 | Stop reason: HOSPADM

## 2018-11-21 RX ORDER — TAMSULOSIN HYDROCHLORIDE 0.4 MG/1
0.4 CAPSULE ORAL
Status: DISCONTINUED | OUTPATIENT
Start: 2018-11-21 | End: 2018-11-28 | Stop reason: HOSPADM

## 2018-11-21 RX ADMIN — GABAPENTIN 300 MG: 300 CAPSULE ORAL at 16:11

## 2018-11-21 RX ADMIN — SODIUM CHLORIDE 125 ML/HR: 0.9 INJECTION, SOLUTION INTRAVENOUS at 01:15

## 2018-11-21 RX ADMIN — TRAMADOL HYDROCHLORIDE 50 MG: 50 TABLET, COATED ORAL at 20:09

## 2018-11-21 RX ADMIN — TAMSULOSIN HYDROCHLORIDE 0.4 MG: 0.4 CAPSULE ORAL at 16:11

## 2018-11-21 RX ADMIN — SODIUM CHLORIDE TAB 1 GM 2 G: 1 TAB at 09:51

## 2018-11-21 RX ADMIN — GABAPENTIN 300 MG: 300 CAPSULE ORAL at 20:09

## 2018-11-21 RX ADMIN — LIDOCAINE HYDROCHLORIDE: 20 JELLY TOPICAL at 00:46

## 2018-11-21 RX ADMIN — VITAM B12 100 MCG: 100 TAB at 09:57

## 2018-11-21 RX ADMIN — ACETAMINOPHEN 650 MG: 325 TABLET, FILM COATED ORAL at 12:10

## 2018-11-21 RX ADMIN — SODIUM CHLORIDE 500 ML: 0.9 INJECTION, SOLUTION INTRAVENOUS at 00:36

## 2018-11-21 RX ADMIN — ALPRAZOLAM 0.25 MG: 0.25 TABLET ORAL at 17:41

## 2018-11-21 RX ADMIN — FINASTERIDE 5 MG: 5 TABLET, FILM COATED ORAL at 09:51

## 2018-11-21 RX ADMIN — IODIXANOL 100 ML: 320 INJECTION, SOLUTION INTRAVASCULAR at 02:01

## 2018-11-21 RX ADMIN — ALPRAZOLAM 0.25 MG: 0.25 TABLET ORAL at 21:37

## 2018-11-21 RX ADMIN — GABAPENTIN 300 MG: 300 CAPSULE ORAL at 09:51

## 2018-11-21 RX ADMIN — SENNOSIDES 8.6 MG: 8.6 TABLET, FILM COATED ORAL at 21:37

## 2018-11-21 RX ADMIN — AMLODIPINE BESYLATE 5 MG: 5 TABLET ORAL at 16:11

## 2018-11-21 NOTE — ASSESSMENT & PLAN NOTE
· Pt has attempted to pull out Ruelas in the past  · Supportive care and gentle re-direction, sleep hygiene  · Agitation that required restraint   Start low dose quetiapine

## 2018-11-21 NOTE — CONSULTS
CONSULT    Patient Name: Othel Lefort  Patient MRN: [id number]  Date of Service: 11/21/2018   Date / Time Note Created: 11/21/2018 10:07 AM   Referring Provider: Mia Kothari MD  Provider Creating Note: LONI iXe    PCP: Munira Williamson  Attending Provider:  Mia Kothari MD    Reason for Consult: Urinary Retention    HPI -- Ms Linda Dickson is a 51-year-old male seen in consultation for urinary retention and discharge with Ruelas catheter 11/15 with failed trial of void 11/16 requiring insertion of catheter by home visiting nurses and return visit to emergency room for gross hematuria with spontaneous resolution  Ruelas catheter removed once again by primary care physician with requirement for catheter re-insertion  Patient was scheduled for office visit and patient declined awaiting urologic evaluation prior to catheter removal   Patient is afebrile and denies  complaints  Creatinine normalized overnight post catheter insertion from 1 42 to baseline 1 20  Patient maintained on tamsulosin  CT scan of the abdomen and pelvis show positive large bladder diverticulum versus peritoneal cyst/mass  Bilateral upper tracts are nonobstructed  There is no lymphadenopathy appreciated  Significant prostatomegaly  Ruelas catheter is patent for clear mercedes urine  Urologic consultation is requested against patient's  background  Source:chart review and the patient--of note, most of history obtained via chart review, interview with nurse  Patient is very poor historian         Patient Active Problem List   Diagnosis    Dementia    Urinary retention    [facility] [facility] )    History of throat cancer    Acute-on-chronic kidney injury ([facility] )    Microscopic hematuria       Impressions  BPH--significant prostatomegaly seen on CT scan  Urinary retention--chronic in secondary to 1   With evidence suggesting substantial long-term high bladder pressures with subsequent development of bladder diverticulum versus mass/peritoneal cyst   Acute kidney injury--question obstructive  Resolved post catheter insertion    Recommendations  Maintain Ruelas catheter to straight drainage  Do not remove  Not nursing or other department managed  Not inclined to suspect malignancy at this point  CT scan is negative for other finding such as lymphadenopathy or metastatic disease  However, will review ultrasound when made available  In light of significant bladder diverticulum, patient will unfortunately never fully empty bladder  Continue alpha blockade  Due to patient's advanced age, surgical intervention may not be in patient's best interest and comes with significant risk  However, will defer this decision to attending  Patient does not require emergent  intervention, but further evaluation and workup for possible consideration for other newer surgical approaches to treat BPH such as urolift, if appropriate  Will reiterate, it may not help to empty bladder, considering presence of diverticulum  Discharge with Ruelas in place  Assign visiting nurses services to patient for monitoring and catheter care at home  Our service will reschedule office follow-up            Past Medical History:   Diagnosis Date    Gall stone     Hypotension     Throat cancer ([address]     Urinary retention        Past Surgical History:   Procedure Laterality Date    TONSILLECTOMY         Family History   Problem Relation Age of Onset    No Known Problems Mother     No Known Problems Father     No Known Problems Sister     No Known Problems Brother     No Known Problems Son     No Known Problems Daughter     No Known Problems Maternal Grandmother     No Known Problems Maternal Grandfather     No Known Problems Paternal Grandmother     No Known Problems Paternal Grandfather     No Known Problems Cousin     Rheum arthritis Neg Hx     Osteoarthritis Neg Hx     Asthma Neg Hx     Diabetes Neg Hx     Heart failure Neg Hx     Hyperlipidemia Neg Hx     Hypertension Neg Hx     Migraines Neg Hx     Rashes / Skin problems Neg Hx     Seizures Neg Hx     Stroke Neg Hx     Thyroid disease Neg Hx        Social History     Social History    Marital status: Registered Domestic Partner     Spouse name: N/A    Number of children: N/A    Years of education: N/A     Occupational History    Not on file       Social History Main Topics    Smoking status: Never Smoker    Smokeless tobacco: Never Used    Alcohol use No    Drug use: No    Sexual activity: Not on file     Other Topics Concern    Not on file     Social History Narrative    No narrative on file       No Known Allergies    Review of Systems  10 point review of systems negative except as noted in HPI     Chart Review   Allergies, current medications, history, problem list    Vital Signs & Physical Exam  General appearance: alert, appears stated age, cooperative and slowed mentation  Head: Normocephalic, without obvious abnormality, atraumatic  Neck: no adenopathy, no carotid bruit, no JVD, supple, symmetrical, trachea midline and thyroid not enlarged, symmetric, no tenderness/mass/nodules  Lungs: diminished breath sounds  Heart: regular rate and rhythm, S1, S2 normal, no murmur, click, rub or gallop  Abdomen: soft, non-tender; bowel sounds normal; no masses,  no organomegaly  Extremities: extremities normal, warm and well-perfused; no cyanosis, clubbing, or edema  Pulses: 2+ and symmetric  Neurologic: Grossly normal  Ruelas patent for clear mercedes urine     Laboratory Studies  Lab Results   Component Value Date     02/24/2014    K 4 5 11/21/2018    K 4 7 02/24/2014     11/21/2018     02/24/2014    CO2 29 11/21/2018    CO2 26 02/24/2014    GLUCOSE 114 02/24/2014    CREATININE 1 20 11/21/2018    CREATININE 0 92 02/24/2014    BUN 20 11/21/2018    BUN 23 02/24/2014    MG 1 9 11/15/2018       Imaging and Other Studies  )Xr Chest 1 View Portable    Result Date: 11/21/2018  Narrative: CHEST INDICATION:   hematuria  Urinary retention COMPARISON:  02/24/2014 EXAM PERFORMED/VIEWS:  XR CHEST PORTABLE One image FINDINGS: Cardiomediastinal silhouette appears unremarkable  No evidence of heart failure  Questionable nodule in the right midlung  No infiltrates  No pneumothorax  No pleural effusion  Osseous structures appear within normal limits for patient age  Impression: Questionable nodule in the right midlung  This is the not described on the initial ED interpretation  CT of the chest recommended  The study was marked in [facility] for immediate notification  Workstation performed: [id number]     Ct Renal Protocol    Result Date: 11/21/2018  Narrative: CT ABDOMEN AND PELVIS WITH AND WITHOUT IV CONTRAST INDICATION:   Hematuria  COMPARISON:  None  TECHNIQUE: Initial CT of the abdomen and pelvis was performed without intravenous contrast   Subsequent dynamic CT evaluation of the abdomen and pelvis was performed after the administration of intravenous contrast in both nephrographic and delayed phases after the administration of intravenous contrast   Axial, sagittal, and coronal 2D reformatted images were created from the source data and submitted for interpretation  Radiation dose length product (DLP) for this visit:  7524 mGy-cm   This examination, like all CT scans performed in the [facility], was performed utilizing techniques to minimize radiation dose exposure, including the use of iterative reconstruction and automated exposure control  IV Contrast:  100 mL of iodixanol (VISIPAQUE) Enteric Contrast:  Enteric contrast was not administered  FINDINGS: ABDOMEN RIGHT KIDNEY AND URETER: No solid renal mass  No detectable urothelial mass  No hydronephrosis or hydroureter  No urinary tract calculi  No perinephric collection  LEFT KIDNEY AND URETER: No solid renal mass  No detectable urothelial mass  No hydronephrosis or hydroureter  No urinary tract calculi  No perinephric collection  URINARY BLADDER: Partially collapsed around a Ruelas catheter balloon  Tiny droplets of air in the urinary bladder  Large left posterior urinary bladder diverticulum versus peritoneal cyst/mass measuring 4 1 x 3 9 cm  Thickening of the urinary bladder wall is seen  No calculi  LOWER CHEST:  No clinically significant abnormality identified in the visualized lower chest  LIVER/BILIARY TREE:  Unremarkable  GALLBLADDER:  There are gallstone(s) within the gallbladder, without pericholecystic inflammatory changes  SPLEEN:  Unremarkable  PANCREAS:  Fatty infiltration of the pancreas  ADRENAL GLANDS:  Unremarkable  STOMACH AND BOWEL:  Unremarkable  ABDOMINOPELVIC CAVITY:  No ascites  No free intraperitoneal air  No lymphadenopathy  VESSELS:  Atherosclerotic changes of the aorta and its branches  PELVIS REPRODUCTIVE ORGANS:  Extremely large prostate APPENDIX: No findings to suggest appendicitis  ABDOMINAL WALL/INGUINAL REGIONS:  There is a small fat-containing umbilical hernia  Large left inguinal hernia containing nonobstructed loops of bowel  OSSEOUS STRUCTURES:  Heterogeneous appearance of the sacrum and iliac bones of unclear etiology  Degenerative changes of the spine are seen  Impression: Diffusely thickened urinary bladder wall which is collapsed around a Ruelas catheter balloon  Correlate for cystitis  Large left posterior urinary bladder diverticulum versus peritoneal cyst/mass  Recommend nonemergent urinary bladder ultrasound for further evaluation  Cholelithiasis The study was marked in EPIC for significant notification   Workstation performed: [id number]         Medications   Scheduled Meds:  Current Facility-Administered Medications:  acetaminophen 650 mg Oral Q6H PRN Nestora Cogan, PA-C   bisacodyl 10 mg Rectal Daily PRN Nestora Cogan, PA-EULALIO   cyanocobalamin 100 mcg Oral Daily Nestora Cogan, PA-EULALIO   finasteride 5 mg Oral Daily Nestora Cogan, PA-C   gabapentin 300 mg Oral TID Bhavna Brown PA-C   senna 1 tablet Oral HS Bhavna Brown PA-C   sodium chloride 2 g Oral Daily Bhavna Brown PA-C   tamsulosin 0 4 mg Oral Daily With Dinner Bhavna Brown PA-C     Continuous Infusions:   PRN Meds:   acetaminophen    bisacodyl      Total time spent with patient  20minutes, >50% spent counseling and/or coordination of care           )LONI Woods

## 2018-11-21 NOTE — QUICK NOTE
Contacted by Internal Medicine service to review plan with patient's significant other and daughter  We discussed the following:  Diagnosis and impression  Risk factors with and without catheter    Plan:  Patient's daughter and significant other were amenable to the following--    Maintain Ruelas catheter  Do not remove  Continue medical optimization and proceed with transfer to short-term rehab  Ruelas catheter will be removed by skilled nursing at rehab facility  Begin CIC at facility if patient does not void t i d   Follow-up with attending physician at outpatient visit to to discuss and determine long term elimination management  Answered all questions to both daughter and significant other satisfaction  Communicated with Dr Genie Blanco, attending Internal Medicine

## 2018-11-21 NOTE — QUICK NOTE
Ultrasound reviewed:  Large bladder diverticulum confirmed  Proceed with plan as noted prior  Refer to previous note  After visit summary details instructions for Ruelas catheter care and trial of void to be completed by skilled nursing at [facility]  Orders placed to maintain Ruelas and not to remove    Our office will contact caregiver with follow-up  No further  intervention indicated

## 2018-11-21 NOTE — UTILIZATION REVIEW
Initial Clinical Review    Admission: Date/Time/Statement: 11/21/2018  0307 OBSERVATION AND CHANGED TO INPATIENT 11/21/2018  1422 RE: needs > 2 midnight stay for saleh catheter management/voding trial/possible straight cath if voiding difficulty  Start   Ordered   11/21/18 1422  Inpatient Admission Once     Transfer Service: General Medicine       Question Answer Comment   Admitting Physician Estefany Holguin    Level of Care Med Surg    Estimated length of stay More than 2 Midnights    Certification I certify that inpatient services are medically necessary for this patient for a duration of greater than two midnights  See H&P and MD Progress Notes for additional information about the patient's course of treatment  11/21/18 1422             ED: Date/Time/Mode of Arrival:   ED Arrival Information     Expected Arrival Acuity Means of Arrival Escorted By Service Admission Type    - 11/20/2018 23:55 Urgent Wheelchair Family Member Hospitalist Urgent    Arrival Complaint    prostate blockage per family          Chief Complaint:   Chief Complaint   Patient presents with    Urinary Retention     Pt has had multiple hospitalizations for urinary retention  Pt c/o lower pelvic pain  Pt states he has been urinating all day but sabrina a little at a time  History of Illness: 80 y o  male with PMHx of dementia, throat cancer who presents with urinary retention  Pt is alert and oriented x2  He knows his name and that he is in the hospital  But does not know the year  He states he is here because he could not sleep last night because of "pain in my penis"  He cannot specify any further  When asked if has gross hematuria, he says "there was"       ED Vital Signs:   ED Triage Vitals [11/21/18 0002]   Temperature Pulse Respirations Blood Pressure SpO2   97 6 °F (36 4 °C) 58 16 163/72 98 %      Temp Source Heart Rate Source Patient Position - Orthostatic VS BP Location FiO2 (%)   Oral Monitor Sitting Right arm -- Pain Score       --        Wt Readings from Last 1 Encounters:   11/21/18 94 kg (207 lb 3 7 oz)       Vital Signs (abnormal): maximum /78  Low pulse 52  Exam - abdominal distention and mild suprapubic tenderness  Bradycardia  Abnormal Labs/Diagnostic Test Results:   UA large blood  Bun 24  Creatinine 1 42  Alkaline phosphatase 248  Albumin 3  Ct renal protocol - Diffusely thickened urinary bladder wall which is collapsed around a Saleh catheter balloon   Correlate for cystitis  Large left posterior urinary bladder diverticulum versus peritoneal cyst/mass   Recommend nonemergent urinary bladder ultrasound for further evaluation  Cholelithiasis    CxR- Questionable nodule in the right midlung    ED Treatment: urinary saleh  Medication Administration from 11/20/2018 0223 to 11/21/2018 0339       Date/Time Order Dose Route Action Comments     11/21/2018 0136 sodium chloride 0 9 % bolus 500 mL 0 mL Intravenous Stopped      11/21/2018 0036 sodium chloride 0 9 % bolus 500 mL 500 mL Intravenous New Bag      11/21/2018 0115 sodium chloride 0 9 % infusion 125 mL/hr Intravenous New Bag      11/21/2018 0046 lidocaine (URO-JET) 2 % topical gel   Topical Given      11/21/2018 0201 iodixanol (VISIPAQUE) 320 MG/ML injection 100 mL 100 mL Intravenous Given           Past Medical/Surgical History: Active Ambulatory Problems     Diagnosis Date Noted    Dementia 11/14/2018    Urinary retention 11/14/2018    Acute kidney injury ([facility] [address] ) 11/14/2018    History of throat cancer 11/14/2018    Acute-on-chronic kidney injury ([facility] [address] ) 11/21/2018     Past Medical History:   Diagnosis Date    Gall stone     Hypotension     Throat cancer ([facility] [address] )     Urinary retention        Admitting Diagnosis: Gallstone [K80 20]  Urinary retention [R33 9]  Microscopic hematuria [R31 29]  Acute-on-chronic kidney injury ([address] ) [N17 9, N18 9]    Age/Sex: 80 y o  male    Assessment/Plan:   This is a 80year old male from home with past medical history of dementia and throat cancer who presents with urinary retention  He has a history of urinary retention due to enlarged prostate  Patient presented to ED 11/13 for retention, required straight cath and was discharged home, wife refused saleh catheter as patient pulls out in the past  He returned 11/14 for retention, admitted for observation, required saleh, but had successful voiding trial on 11/15 and discharged with out it  On 11/15 patient returned to ED with lower abdominal pain and burning, found in retention, saleh placed and discharged home  On 11/16, patient returned to ED with blood in saleh, labs ok and patient dc home to follow with PCP to remove saleh on Monday  Patient returns  11/20/2018 to ED, PCP removed saleh earlier in day and patient had retention  Saleh placed  CT abdomen showed large left posterior urinary bladder diverticulum vs peritoneal cyst/mass  Urinary bladder US recommended  Patient is admitted to observation and urology consulted  BMP will be monitored for increased creatinine of 1 42 with baseline of 1 1  Patient changed to inpatient on 11/21/2018-  Patient with history of dementia, who lives at home has urinary retention  Patient pulls at saleh at times, Patient will likely benefit from higher level care for Saleh catheter management/voiding trial/possible straight cath if voiding difficulty  PT/OT eval for possible rehab  placement   Continue Saleh catheter for now    Admission Orders: 11/21/2018  0307 OBSERVATION AND CHANGED TO  INPATIENT 11/21/2018  1422  Scheduled Meds:   Current Facility-Administered Medications:  acetaminophen 650 mg Oral Q6H PRN   bisacodyl 10 mg Rectal Daily PRN   cyanocobalamin 100 mcg Oral Daily   finasteride 5 mg Oral Daily   gabapentin 300 mg Oral TID   senna 1 tablet Oral HS   sodium chloride 2 g Oral Daily   tamsulosin 0 4 mg Oral Daily With Dinner     Continuous Infusions:   sodium chloride 0 9 % infusion  Rate: 125 mL/hr Dose: 125 mL/hr  Freq: Continuous Route: IV  Indications of Use: IV Hydration  Last Dose: 125 mL/hr (11/21/18 0115)  Start: 11/21/18 0015 End: 11/21/18 0502    PRN Meds:   Acetaminophen- used x 1  OTHER ORDERS: insert urinary catheter  scds  Consult urology    Per urology - BPH--significant prostatomegaly seen on CT scan  Urinary retention--chronic in secondary to 1  With evidence suggesting substantial long-term high bladder pressures with subsequent development of bladder diverticulum versus mass/peritoneal cyst   Acute kidney injury--question obstructive  Resolved post catheter insertion     Recommendations  Maintain Ruelas catheter to straight drainage  Do not remove  Not nursing or other department managed  Not inclined to suspect malignancy at this point  CT scan is negative for other finding such as lymphadenopathy or metastatic disease  However, will review ultrasound when made available  In light of significant bladder diverticulum, patient will unfortunately never fully empty bladder  Continue alpha blockade  Due to patient's advanced age, surgical intervention may not be in patient's best interest and comes with significant risk  However, will defer this decision to attending  Patient does not require emergent  intervention, but further evaluation and workup for possible consideration for other newer surgical approaches to treat BPH such as urolift, if appropriate  Will reiterate, it may not help to empty bladder, considering presence of diverticulum        [facility] Utilization Review Department  Phone: [phone]; Fax [phone]  [email or web address]  ATTENTION: Please call with any questions or concerns to [phone]  and carefully listen to the prompts so that you are directed to the right person  Send all requests for admission clinical reviews, approved or denied determinations and any other requests to fax [phone]  All voicemails are confidential

## 2018-11-21 NOTE — PLAN OF CARE
DISCHARGE PLANNING     Discharge to home or other facility with appropriate resources Progressing        INFECTION - ADULT     Absence or prevention of progression during hospitalization Progressing        Knowledge Deficit     Patient/family/caregiver demonstrates understanding of disease process, treatment plan, medications, and discharge instructions Progressing        PAIN - ADULT     Verbalizes/displays adequate comfort level or baseline comfort level Progressing        Prexisting or High Potential for Compromised Skin Integrity     Skin integrity is maintained or improved Progressing        SAFETY ADULT     Patient will remain free of falls Progressing     Maintain or return to baseline ADL function Progressing     Maintain or return mobility status to optimal level Progressing

## 2018-11-21 NOTE — ASSESSMENT & PLAN NOTE
· Cr baseline likely 1 1-1 2, today is 1 42  · Likely post-obstructive uropathy 2/2 BPH and urinary retention   · Suspect improvement following placement of Ruelas   · This was the case on previous admission   · Monitor BMP

## 2018-11-21 NOTE — ASSESSMENT & PLAN NOTE
· Pt has attempted to pull out Ruelas in the past  · Supportive care and gentle re-direction, sleep hygiene

## 2018-11-21 NOTE — ED PROVIDER NOTES
History  Chief Complaint   Patient presents with    Urinary Retention     Pt has had multiple hospitalizations for urinary retention  Pt c/o lower pelvic pain  Pt states he has been urinating all day but sabrina a little at a time  Patient is a 80year old male who had saleh catheter removed by PMD yesterday and has had trouble urinating today  Was last seen in this ED on 11/16/18 for gross hematuria and had saleh catheter  (+) lower abdominal pain  No N/V/D  No fever  Family states that Dr Taran Miller wants patient admitted tonight  Patient has dementia and pulls at saleh catheter as per family  No known abdominal surgery  Has appt with urology on 12/13/18  [facility] website checked on this patient and last Rx filled was on 10/30/18 for vicodin for 40 day supply  History provided by:  Patient, relative and significant other (daughter)  History limited by:  Dementia   used: No        Prior to Admission Medications   Prescriptions Last Dose Informant Patient Reported? Taking?    ALPRAZolam (XANAX) 0 25 mg tablet   Yes Yes   Sig: Take by mouth daily at bedtime as needed for anxiety   HYDROcodone-acetaminophen (VICODIN) 5-300 MG per tablet   Yes Yes   Sig: Take 1 tablet by mouth every 6 (six) hours as needed for moderate pain   bisacodyl (DULCOLAX) 10 mg suppository   No Yes   Sig: Insert 1 suppository (10 mg total) into the rectum daily as needed for constipation for up to 20 doses   cyanocobalamin (VITAMIN B-12) 100 mcg tablet   Yes Yes   Sig: Take by mouth daily   finasteride (PROSCAR) 5 mg tablet   No Yes   Sig: Take 1 tablet (5 mg total) by mouth daily   gabapentin (NEURONTIN) 300 mg capsule   No Yes   Sig: Take 1 capsule (300 mg total) by mouth 3 (three) times a day   senna (SENOKOT) 8 6 mg   No Yes   Sig: Take 1 tablet (8 6 mg total) by mouth daily at bedtime   sodium chloride 1 g tablet   Yes Yes   Sig: Take 2 g by mouth daily   tamsulosin (FLOMAX) 0 4 mg   Yes Yes   Sig: Take 0 4 mg by mouth        Facility-Administered Medications: None       Past Medical History:   Diagnosis Date    Gall stone     Hypotension     Throat cancer ([facility] [address] )     Urinary retention        Past Surgical History:   Procedure Laterality Date    TONSILLECTOMY         Family History   Problem Relation Age of Onset    No Known Problems Mother     No Known Problems Father     No Known Problems Sister     No Known Problems Brother     No Known Problems Son     No Known Problems Daughter     No Known Problems Maternal Grandmother     No Known Problems Maternal Grandfather     No Known Problems Paternal Grandmother     No Known Problems Paternal Grandfather     No Known Problems Cousin     Rheum arthritis Neg Hx     Osteoarthritis Neg Hx     Asthma Neg Hx     Diabetes Neg Hx     Heart failure Neg Hx     Hyperlipidemia Neg Hx     Hypertension Neg Hx     Migraines Neg Hx     Rashes / Skin problems Neg Hx     Seizures Neg Hx     Stroke Neg Hx     Thyroid disease Neg Hx      I have reviewed and agree with the history as documented  Social History   Substance Use Topics    Smoking status: Never Smoker    Smokeless tobacco: Never Used    Alcohol use No        Review of Systems   Unable to perform ROS: Dementia   Constitutional: Negative for fever  Gastrointestinal: Positive for abdominal pain  Negative for diarrhea, nausea and vomiting  Genitourinary: Positive for difficulty urinating and hematuria  All other systems reviewed and are negative  Physical Exam  Physical Exam   Constitutional: He appears well-developed and well-nourished  He appears distressed (moderate)  HENT:   Head: Normocephalic and atraumatic  Moist mucous membranes  Eyes: No scleral icterus  Neck: No tracheal deviation present  Cardiovascular: Regular rhythm and normal heart sounds  No murmur heard  Bradycardia  Pulmonary/Chest: Effort normal and breath sounds normal  No stridor   No respiratory distress  Abdominal: Soft  Bowel sounds are normal  He exhibits distension (mild suprapubic)  There is tenderness (mild suprapubic)  There is no rebound and no guarding  Musculoskeletal: He exhibits no edema or deformity  Neurological:   Awake  Skin: Skin is warm and dry  No rash noted  Psychiatric: He has a normal mood and affect  Nursing note and vitals reviewed        Vital Signs  ED Triage Vitals [11/21/18 0002]   Temperature Pulse Respirations Blood Pressure SpO2   97 6 °F (36 4 °C) 58 16 163/72 98 %      Temp Source Heart Rate Source Patient Position - Orthostatic VS BP Location FiO2 (%)   Oral Monitor Sitting Right arm --      Pain Score       --           Vitals:    11/21/18 0002 11/21/18 0045 11/21/18 0230   BP: 163/72 137/63 (!) 175/78   Pulse: 58 (!) 52 (!) 52   Patient Position - Orthostatic VS: Sitting Lying Lying       Visual Acuity      ED Medications  Medications   sodium chloride 0 9 % infusion (125 mL/hr Intravenous New Bag 11/21/18 0115)   sodium chloride 0 9 % bolus 500 mL (0 mL Intravenous Stopped 11/21/18 0136)   lidocaine (URO-JET) 2 % topical gel ( Topical Given 11/21/18 0046)   iodixanol (VISIPAQUE) 320 MG/ML injection 100 mL (100 mL Intravenous Given 11/21/18 0201)       Diagnostic Studies  Results Reviewed     Procedure Component Value Units Date/Time    Urine Microscopic [[id number]]  (Abnormal) Collected:  11/21/18 0114    Lab Status:  Final result Specimen:  Urine from Urine, Straight Cath Updated:  11/21/18 0218     RBC, UA 10-20 (A) /hpf      WBC, UA 0-1 (A) /hpf      Epithelial Cells Occasional /hpf      Bacteria, UA None Seen /hpf     UA w Reflex to Microscopic w Reflex to Culture [[id number]]  (Abnormal) Collected:  11/21/18 0114    Lab Status:  Final result Specimen:  Urine from Urine, Straight Cath Updated:  11/21/18 0210     Color, UA Yellow     Clarity, UA Clear     Specific [place], UA 1 020     pH, UA 5 5     Leukocytes, UA Negative     Nitrite, UA Negative Protein, UA Negative mg/dl      Glucose, UA Negative mg/dl      Ketones, UA Negative mg/dl      Urobilinogen, UA 0 2 E U /dl      Bilirubin, UA Negative     Blood, UA Large (A)    Comprehensive metabolic panel [[id number]]  (Abnormal) Collected:  11/21/18 0034    Lab Status:  Final result Specimen:  Blood from Arm, Right Updated:  11/21/18 0100     Sodium 139 mmol/L      Potassium 4 7 mmol/L      Chloride 105 mmol/L      CO2 27 mmol/L      ANION GAP 7 mmol/L      BUN 24 mg/dL      Creatinine 1 42 (H) mg/dL      Glucose 97 mg/dL      Calcium 8 4 mg/dL      AST 13 U/L      ALT 17 U/L      Alkaline Phosphatase 248 (H) U/L      Total Protein 6 6 g/dL      Albumin 3 0 (L) g/dL      Total Bilirubin 0 60 mg/dL      eGFR 43 ml/min/1 73sq m     Narrative:         National Kidney Disease Education Program recommendations are as follows:  GFR calculation is accurate only with a steady state creatinine  Chronic Kidney disease less than 60 ml/min/1 73 sq  meters  Kidney failure less than 15 ml/min/1 73 sq  meters      Protime-INR [[id number]]  (Normal) Collected:  11/21/18 0034    Lab Status:  Final result Specimen:  Blood from Arm, Right Updated:  11/21/18 0054     Protime 13 2 seconds      INR 1 03    APTT [[id number]]  (Normal) Collected:  11/21/18 0034    Lab Status:  Final result Specimen:  Blood from Arm, Right Updated:  11/21/18 0054     PTT 30 seconds     CBC and differential [[id number]] Collected:  11/21/18 0034    Lab Status:  Final result Specimen:  Blood from Arm, Right Updated:  11/21/18 0044     WBC 6 83 Thousand/uL      RBC 4 33 Million/uL      Hemoglobin 13 4 g/dL      Hematocrit 40 4 %      MCV 93 fL      MCH 30 9 pg      MCHC 33 2 g/dL      RDW 12 6 %      MPV 11 2 fL      Platelets 296 Thousands/uL      nRBC 0 /100 WBCs      Neutrophils Relative 52 %      Immat GRANS % 1 %      Lymphocytes Relative 34 %      Monocytes Relative 9 %      Eosinophils Relative 3 %      Basophils Relative 1 %      Neutrophils Absolute 3 66 Thousands/µL      Immature Grans Absolute 0 04 Thousand/uL      Lymphocytes Absolute 2 33 Thousands/µL      Monocytes Absolute 0 59 Thousand/µL      Eosinophils Absolute 0 17 Thousand/µL      Basophils Absolute 0 04 Thousands/µL                  CT renal protocol   ED Interpretation by Thomas Alan MD (11/21 0300)   FINDINGS:      ABDOMEN      RIGHT KIDNEY AND URETER:   No solid renal mass   No detectable urothelial mass  No hydronephrosis or hydroureter  No urinary tract calculi  No perinephric collection  LEFT KIDNEY AND URETER:   No solid renal mass   No detectable urothelial mass  No hydronephrosis or hydroureter  No urinary tract calculi  No perinephric collection  URINARY BLADDER:   Partially collapsed around a Ruelas catheter balloon   Tiny droplets of air in the urinary bladder   Large left posterior urinary bladder diverticulum versus peritoneal cyst/mass measuring 4 1 x 3 9 cm   Thickening of the urinary bladder wall is seen  No calculi  LOWER CHEST:  No clinically significant abnormality identified in the visualized lower chest       LIVER/BILIARY TREE:  Unremarkable  GALLBLADDER:  There are gallstone(s) within the gallbladder, without pericholecystic inflammatory changes  SPLEEN:  Unremarkable  PANCREAS:  Fatty infiltration of the pancreas  ADRENAL GLANDS:  Unremarkable  STOMACH AND BOWEL:  Unremarkable  ABDOMINOPELVIC CAVITY:  No ascites   No free intraperitoneal air  No lymphadenopathy  VESSELS:  Atherosclerotic changes of the aorta and its branches  PELVIS      REPRODUCTIVE ORGANS:  Extremely large prostate      APPENDIX: No findings to suggest appendicitis  ABDOMINAL WALL/INGUINAL REGIONS: [place] is a small fat-containing umbilical hernia   Large left inguinal hernia containing nonobstructed loops of bowel  OSSEOUS STRUCTURES:  Heterogeneous appearance of the sacrum and iliac bones of unclear etiology   Degenerative changes of the spine are seen  Impression:        Diffusely thickened urinary bladder wall which is collapsed around a Ruelas catheter balloon   Correlate for cystitis  Large left posterior urinary bladder diverticulum versus peritoneal cyst/mass   Recommend nonemergent urinary bladder ultrasound for further evaluation  Cholelithiasis      The study was marked in EPIC for significant notification  Workstation performed: [id number]         Final Result by Ruy Anand DO (11/21 0247)      Diffusely thickened urinary bladder wall which is collapsed around a Ruelas catheter balloon  Correlate for cystitis  Large left posterior urinary bladder diverticulum versus peritoneal cyst/mass  Recommend nonemergent urinary bladder ultrasound for further evaluation  Cholelithiasis      The study was marked in EPIC for significant notification  Workstation performed: [id number]         XR chest 1 view portable   ED Interpretation by Carmel Akbar MD (11/21 0150)   No acute disease read by me  Procedures  ECG 12 Lead Documentation  Date/Time: 11/21/2018 12:26 AM  Performed by: Silvino Peguero  Authorized by: Silvino Peguero     Indications / Diagnosis:  Gross hematuria  ECG reviewed by me, the ED Provider: yes    Patient location:  ED  Previous ECG:     Previous ECG:  Compared to current    Comparison ECG info:  2/24/14    Similarity:  Changes noted (inverted P waves)  Rate:     ECG rate:  54    ECG rate assessment: bradycardic    Rhythm:     Rhythm comment:  S  allie vs  ectopic bradycardia; inverted P waves  Ectopy:     Ectopy: none    QRS:     QRS axis:  Normal  Conduction:     Conduction: normal    ST segments:     ST segments:  Normal  T waves:     T waves: normal             Phone Contacts  ED Phone Contact    ED Course  ED Course as of Nov 21 0331   Wed Nov 21, 2018   0128 Labs d/w daughters   Ruelas placed and about 300 mL of urine obtained and no gross hematuria noted at this time  IVFs being given for acute on chronic kidney injury  MDM  Number of Diagnoses or Management Options  Diagnosis management comments: DDx including but not limited to: UTI, coagulopathy, anemia, renal colic, pyelonephritis, tumor, urinary retention, recent instrumentation  Amount and/or Complexity of Data Reviewed  Clinical lab tests: ordered and reviewed  Tests in the radiology section of CPT®: ordered and reviewed  Decide to obtain previous medical records or to obtain history from someone other than the patient: yes  Obtain history from someone other than the patient: yes  Review and summarize past medical records: yes  Independent visualization of images, tracings, or specimens: yes      CritCare Time    Disposition  Final diagnoses:   Acute-on-chronic kidney injury ([facility] [address] )   Microscopic hematuria   Urinary retention   Gallstone     Time reflects when diagnosis was documented in both MDM as applicable and the Disposition within this note     Time User Action Codes Description Comment    11/21/2018  3:05 AM Charmel Michelet Add [N17 9,  N18 9] Acute-on-chronic kidney injury ([facility] [address] )     11/21/2018  3:05 AM Charmel Michelet Add [R31 29] Microscopic hematuria     11/21/2018  3:05 AM Charmel Michelet Add [R33 9] Urinary retention     11/21/2018  3:06 AM Charmel Michelet Add [K80 20] Gallstone       ED Disposition     ED Disposition Condition Comment    Admit  Case was discussed with JESSICA Denney and the patient's admission status was agreed to be Admission Status: observation status to the service of Dr Kole Hopson   Follow-up Information    None         Patient's Medications   Discharge Prescriptions    No medications on file     No discharge procedures on file      ED Provider  Electronically Signed by           Raj Ayala MD  11/21/18 6165       Raj Ayala MD  11/21/18 3158

## 2018-11-21 NOTE — ASSESSMENT & PLAN NOTE
· Discharged on 11/15 after passing voiding trial without Ruelas, however 11/16 required Ruelas placement in ED with return of acute urinary retention 2/2 BPH  · PCP removed Ruelas today and again pt presents with retention to the ED   · CTAP shows "Large left posterior urinary bladder diverticulum"  Confirmed by urinary bladder ultrasound  · Maintain Ruelas  · Family expressed concern managing catheter at home due to dementia   May benefit from higher level care

## 2018-11-21 NOTE — H&P
H&P- Edgard Dhaliwal 8/14/1926, 80 y o  male MRN: [id number]    Unit/Bed#: -01 Encounter: [id number]    Primary Care Provider: Richard Cast DO   Date and time admitted to hospital: 11/20/2018 11:58 PM    * Urinary retention   Assessment & Plan    · Discharged on 11/15 after passing voiding trial without Ruelas, however 11/16 required Ruelas placement in ED with return of acute urinary retention 2/2 BPH  · Then presented after this for gross hematuria, no CBI required as this improved and pt comfortable to be discharged with outpatient f/u  · PCP removed Ruelas today and again pt presents with retention to the ED   · Ruelas placed again and 300 mL's of urine put out, no clots or gross hematuria   · CTAP shows "Large left posterior urinary bladder diverticulum versus peritoneal cyst/mass    Recommend nonemergent urinary bladder ultrasound for further evaluation"  · UA negative for infection although it is remarkable for microscopic hematuria  · CBC wnl, afebrile, hgb stable at 13 4  · Urology appointment for hematuria is not until 12/13 and PCP Dr Erik Payton would like admission tonight with inpatient Urology consult   · Will obtain bladder US and consult Urology   · Continue Flomax and Proscar     Acute kidney injury [facility])   Assessment & Plan    · Cr baseline likely 1 1-1 2, today is 1 42  · Likely post-obstructive uropathy 2/2 BPH and urinary retention   · Suspect improvement following placement of Ruelas   · This was the case on previous admission   · Monitor BMP      History of throat cancer   Assessment & Plan    · No issues currently, follows with ENT at [place]    · Pt has attempted to pull out Ruelas in the past  · Supportive care and gentle re-direction, sleep hygiene     Microscopic hematuria   Assessment & Plan    · See primary plan        VTE Prophylaxis: hold, risk of gross hematuria  / sequential compression device   Code Status: DNR/DNI, this is per prior and will need to be discussed with family once available   POLST: POLST form is not discussed and not completed at this time  Discussion with family: none    Anticipated Length of Stay:  Patient will be admitted on an Observation basis with an anticipated length of stay of  < 2 midnights  Justification for Hospital Stay: per plan above    Total Time for Visit, including Counseling / Coordination of Care: 20 minutes  Greater than 50% of this total time spent on direct patient counseling and coordination of care  Chief Complaint:   Urinary retention    History of Present Illness: Loretta Gaming is a 80 y o  male with PMHx of dementia, throat cancer who presents with urinary retention  Pt is alert and oriented x2  He knows his name and that he is in the hospital  But does not know the year  He states he is here because he could not sleep last night because of "pain in my penis"  He cannot specify any further  When asked if has gross hematuria, he says "there was"  Denies CP or SOB  Denies back pain  Denies abdominal pain or diarrhea/n/v  Denies HA or dizziness  Review of Systems:    Review of Systems   Constitutional: Negative  HENT: Negative  Eyes: Negative  Respiratory: Negative  Cardiovascular: Negative  Gastrointestinal: Negative  Endocrine: Negative  Genitourinary: Positive for hematuria and penile pain  Musculoskeletal: Negative  Skin: Negative  Allergic/Immunologic: Negative  Neurological: Negative  Hematological: Negative  Psychiatric/Behavioral: Negative  Past Medical and Surgical History:     Past Medical History:   Diagnosis Date    Gall stone     Hypotension     [facility]     Urinary retention        Past Surgical History:   Procedure Laterality Date    TONSILLECTOMY         Meds/Allergies:    Prior to Admission medications    Medication Sig Start Date End Date Taking?  Authorizing Provider   ALPRAZolam Alcides Leonard 0 25 mg tablet Take by mouth daily at bedtime as needed for anxiety   Yes Historical Provider, MD   bisacodyl (DULCOLAX) 10 mg suppository Insert 1 suppository (10 mg total) into the rectum daily as needed for constipation for up to 20 doses 11/15/18  Yes LONI Murray   cyanocobalamin (VITAMIN B-12) 100 mcg tablet Take by mouth daily   Yes Historical Provider, MD   finasteride (PROSCAR) 5 mg tablet Take 1 tablet (5 mg total) by mouth daily 11/16/18  Yes LONI Murray   gabapentin (NEURONTIN) 300 mg capsule Take 1 capsule (300 mg total) by mouth 3 (three) times a day 11/15/18  Yes LONI Murray   HYDROcodone-acetaminophen (VICODIN) 5-300 MG per tablet Take 1 tablet by mouth every 6 (six) hours as needed for moderate pain   Yes Historical Provider, MD   senna (SENOKOT) 8 6 mg Take 1 tablet (8 6 mg total) by mouth daily at bedtime 11/15/18  Yes LONI Murray   sodium chloride 1 g tablet Take 2 g by mouth daily   Yes Historical Provider, MD   tamsulosin (FLOMAX) 0 4 mg Take 0 4 mg by mouth   10/12/17  Yes Historical Provider, MD STOUT have reviewed home medications using allscripts      Allergies: No Known Allergies    Social History:     Marital Status: Registered Domestic Partner   Occupation: retired  Patient Pre-hospital Living Situation: not discussed  Patient Pre-hospital Level of Mobility: not discussed  Patient Pre-hospital Diet Restrictions: none  Substance Use History:   History   Alcohol Use No     History   Smoking Status    Never Smoker   Smokeless Tobacco    Never Used     History   Drug Use No       Family History:    non-contributory    Physical Exam:     Vitals:   Blood Pressure: 142/82 (11/21/18 0358)  Pulse: 59 (11/21/18 0358)  Temperature: 97 6 °F (36 4 °C) (11/21/18 0358)  Temp Source: Oral (11/21/18 0358)  Respirations: 18 (11/21/18 0358)  Weight - Scale: 94 kg (207 lb 3 7 oz) (11/21/18 0002)  SpO2: 99 % (11/21/18 0358)    Physical Exam   Constitutional: He appears well-developed and well-nourished  No distress  Resting comfortably   HENT:   Head: Normocephalic  Mouth/Throat: Oropharynx is clear and moist    Cardiovascular: Normal rate, regular rhythm, normal heart sounds and intact distal pulses  Pulmonary/Chest: Effort normal and breath sounds normal  No respiratory distress  He has no wheezes  He has no rales  He exhibits no tenderness  Abdominal: Soft  Bowel sounds are normal  He exhibits no distension and no mass  There is no tenderness  There is no rebound and no guarding  Genitourinary:   Genitourinary Comments: Ruelas draining pink-yellow urine, no clots    Musculoskeletal: He exhibits no edema or tenderness  Neurological: He is alert  Oriented x2, pleasant   Skin: Skin is warm and dry  No rash noted  He is not diaphoretic  No erythema  No pallor  Psychiatric: He has a normal mood and affect  His behavior is normal    Nursing note and vitals reviewed  Additional Data:     Lab Results: I have personally reviewed pertinent reports  Results from last 7 days  Lab Units 11/21/18  0034   WBC Thousand/uL 6 83   HEMOGLOBIN g/dL 13 4   HEMATOCRIT % 40 4   PLATELETS Thousands/uL 187   NEUTROS PCT % 52   LYMPHS PCT % 34   MONOS PCT % 9   EOS PCT % 3       Results from last 7 days  Lab Units 11/21/18  0034   SODIUM mmol/L 139   POTASSIUM mmol/L 4 7   CHLORIDE mmol/L 105   CO2 mmol/L 27   BUN mg/dL 24   CREATININE mg/dL 1 42*   ANION GAP mmol/L 7   CALCIUM mg/dL 8 4   ALBUMIN g/dL 3 0*   TOTAL BILIRUBIN mg/dL 0 60   ALK PHOS U/L 248*   ALT U/L 17   AST U/L 13   GLUCOSE RANDOM mg/dL 97       Results from last 7 days  Lab Units 11/21/18  0034   INR  1 03                   Imaging: I have personally reviewed pertinent reports  CT renal protocol   ED Interpretation by Rica Pennington MD (11/21 0300)   FINDINGS:      ABDOMEN      RIGHT KIDNEY AND URETER:   No solid renal mass   No detectable urothelial mass     No hydronephrosis or hydroureter  No urinary tract calculi  No perinephric collection  LEFT KIDNEY AND URETER:   No solid renal mass   No detectable urothelial mass  No hydronephrosis or hydroureter  No urinary tract calculi  No perinephric collection  URINARY BLADDER:   Partially collapsed around a Ruelas catheter balloon   Tiny droplets of air in the urinary bladder   Large left posterior urinary bladder diverticulum versus peritoneal cyst/mass measuring 4 1 x 3 9 cm   Thickening of the urinary bladder wall is seen  No calculi  LOWER CHEST:  No clinically significant abnormality identified in the visualized lower chest       LIVER/BILIARY TREE:  Unremarkable  GALLBLADDER:  There are gallstone(s) within the gallbladder, without pericholecystic inflammatory changes  SPLEEN:  Unremarkable  PANCREAS:  Fatty infiltration of the pancreas  ADRENAL GLANDS:  Unremarkable  STOMACH AND BOWEL:  Unremarkable  ABDOMINOPELVIC CAVITY:  No ascites   No free intraperitoneal air  No lymphadenopathy  VESSELS:  Atherosclerotic changes of the aorta and its branches  PELVIS      REPRODUCTIVE ORGANS:  Extremely large prostate      APPENDIX: No findings to suggest appendicitis  ABDOMINAL WALL/INGUINAL REGIONS: Celso Best is a small fat-containing umbilical hernia   Large left inguinal hernia containing nonobstructed loops of bowel  OSSEOUS STRUCTURES:  Heterogeneous appearance of the sacrum and iliac bones of unclear etiology   Degenerative changes of the spine are seen  Impression:        Diffusely thickened urinary bladder wall which is collapsed around a Ruelas catheter balloon   Correlate for cystitis  Large left posterior urinary bladder diverticulum versus peritoneal cyst/mass   Recommend nonemergent urinary bladder ultrasound for further evaluation  Cholelithiasis      The study was marked in EPIC for significant notification           Workstation performed: [id number] Final Result by Kisha Guzman DO (11/21 0850)      Diffusely thickened urinary bladder wall which is collapsed around a Ruelas catheter balloon  Correlate for cystitis  Large left posterior urinary bladder diverticulum versus peritoneal cyst/mass  Recommend nonemergent urinary bladder ultrasound for further evaluation  Cholelithiasis      The study was marked in EPIC for significant notification  Workstation performed: [id number]         XR chest 1 view portable   ED Interpretation by Dalton Mojica MD (11/21 0150)   No acute disease read by me  US bladder    (Results Pending)       Allscripts / Epic Records Reviewed: Yes     ** Please Note: This note has been constructed using a voice recognition system   **

## 2018-11-21 NOTE — DISCHARGE INSTRUCTIONS
Saleh Cath Care and void trial instructions---Maintain saleh catheter to straight drainage  May use leg bag and shower  May flush TID prn using Enrico syringe and 120 ml NSS  May use more saline ad raz to prevent/treat cath obstruction  Remove catheter on 8th day rehab by 0600 hrs  Bladder scan if no void or less than 200ml in 6hrs  Straight cath for bladder scan >350ml and repeat process  If patient requires straight cath x3    Then begin intermittent catheterization t i d  Indefinitely  Urology office will contact caregiver with hospital follow-up appointment date and time

## 2018-11-21 NOTE — ASSESSMENT & PLAN NOTE
· Discharged on 11/15 after passing voiding trial without Ruelas, however 11/16 required Ruelas placement in ED with return of acute urinary retention 2/2 BPH  · Then presented after this for gross hematuria, no CBI required as this improved and pt comfortable to be discharged with outpatient f/u  · PCP removed Ruelas today and again pt presents with retention to the ED   · Ruelas placed again and 300 mL's of urine put out, no clots or gross hematuria   · CTAP shows "Large left posterior urinary bladder diverticulum versus peritoneal cyst/mass    Recommend nonemergent urinary bladder ultrasound for further evaluation"  · UA negative for infection although it is remarkable for microscopic hematuria  · CBC wnl, afebrile, hgb stable at 13 4  · Urology appointment for hematuria is not until 12/13 and PCP Dr Ramon Callaway would like admission tonight with inpatient Urology consult   · Will obtain bladder US and consult Urology   · Continue Flomax and Proscar

## 2018-11-21 NOTE — TELEPHONE ENCOUNTER
Mr Brittany Lopez is a 29-year-old male seen in consultation at [facility] is required multiple catheterizations  Patient was to be seen in office and was evaluated by PCP with Ruelas catheter removal prematurely  Office visit was canceled  Catheter is now reinserted and patient was readmitted to [facility] and again seen in RE--consultation  Please contact patient/caregiver with hospital follow-up appointment date and time  Catheter will not be removed  Appointment can be made in approximately 3 weeks with Dr Tyrese Ko  Thank you

## 2018-11-22 PROCEDURE — 97162 PT EVAL MOD COMPLEX 30 MIN: CPT

## 2018-11-22 PROCEDURE — G8978 MOBILITY CURRENT STATUS: HCPCS

## 2018-11-22 PROCEDURE — G8987 SELF CARE CURRENT STATUS: HCPCS

## 2018-11-22 PROCEDURE — G8979 MOBILITY GOAL STATUS: HCPCS

## 2018-11-22 PROCEDURE — 97167 OT EVAL HIGH COMPLEX 60 MIN: CPT

## 2018-11-22 PROCEDURE — G8988 SELF CARE GOAL STATUS: HCPCS

## 2018-11-22 PROCEDURE — 99232 SBSQ HOSP IP/OBS MODERATE 35: CPT | Performed by: INTERNAL MEDICINE

## 2018-11-22 RX ORDER — ZIPRASIDONE MESYLATE 20 MG/ML
10 INJECTION, POWDER, LYOPHILIZED, FOR SOLUTION INTRAMUSCULAR ONCE
Status: COMPLETED | OUTPATIENT
Start: 2018-11-22 | End: 2018-11-22

## 2018-11-22 RX ORDER — QUETIAPINE FUMARATE 25 MG/1
12.5 TABLET, FILM COATED ORAL ONCE
Status: COMPLETED | OUTPATIENT
Start: 2018-11-22 | End: 2018-11-22

## 2018-11-22 RX ORDER — QUETIAPINE FUMARATE 25 MG/1
12.5 TABLET, FILM COATED ORAL
Status: DISCONTINUED | OUTPATIENT
Start: 2018-11-22 | End: 2018-11-24

## 2018-11-22 RX ORDER — HALOPERIDOL 5 MG/ML
5 INJECTION INTRAMUSCULAR ONCE
Status: COMPLETED | OUTPATIENT
Start: 2018-11-22 | End: 2018-11-22

## 2018-11-22 RX ORDER — LANOLIN ALCOHOL/MO/W.PET/CERES
3 CREAM (GRAM) TOPICAL
Status: DISCONTINUED | OUTPATIENT
Start: 2018-11-22 | End: 2018-11-28 | Stop reason: HOSPADM

## 2018-11-22 RX ADMIN — HALOPERIDOL LACTATE 5 MG: 5 INJECTION, SOLUTION INTRAMUSCULAR at 00:49

## 2018-11-22 RX ADMIN — GABAPENTIN 300 MG: 300 CAPSULE ORAL at 16:36

## 2018-11-22 RX ADMIN — AMLODIPINE BESYLATE 5 MG: 5 TABLET ORAL at 10:22

## 2018-11-22 RX ADMIN — SODIUM CHLORIDE TAB 1 GM 2 G: 1 TAB at 10:23

## 2018-11-22 RX ADMIN — SENNOSIDES 8.6 MG: 8.6 TABLET, FILM COATED ORAL at 20:55

## 2018-11-22 RX ADMIN — TAMSULOSIN HYDROCHLORIDE 0.4 MG: 0.4 CAPSULE ORAL at 16:36

## 2018-11-22 RX ADMIN — QUETIAPINE FUMARATE 12.5 MG: 25 TABLET ORAL at 21:04

## 2018-11-22 RX ADMIN — ENOXAPARIN SODIUM 40 MG: 40 INJECTION SUBCUTANEOUS at 14:13

## 2018-11-22 RX ADMIN — GABAPENTIN 300 MG: 300 CAPSULE ORAL at 10:23

## 2018-11-22 RX ADMIN — GABAPENTIN 300 MG: 300 CAPSULE ORAL at 20:55

## 2018-11-22 RX ADMIN — ZIPRASIDONE MESYLATE 10 MG: 20 INJECTION, POWDER, LYOPHILIZED, FOR SOLUTION INTRAMUSCULAR at 01:44

## 2018-11-22 RX ADMIN — QUETIAPINE FUMARATE 12.5 MG: 25 TABLET ORAL at 10:23

## 2018-11-22 RX ADMIN — VITAM B12 100 MCG: 100 TAB at 10:24

## 2018-11-22 RX ADMIN — MELATONIN TAB 3 MG 3 MG: 3 TAB at 21:04

## 2018-11-22 RX ADMIN — FINASTERIDE 5 MG: 5 TABLET, FILM COATED ORAL at 10:22

## 2018-11-22 NOTE — PROGRESS NOTES
Progress Note Gisel Jack 8/14/1926, 80 y o  male MRN: [id number]    Unit/Bed#: -01 Encounter: [id number]    Primary Care Provider: Darron Grande DO   Date and time admitted to hospital: 11/20/2018 11:58 PM        History of throat cancer   Assessment & Plan    · No issues currently, follows with ENT at [facility]     Acute kidney injury [facility])   Assessment & Plan    · Cr baseline likely 1 1-1 2  · Likely post-obstructive uropathy 2/2 BPH and urinary retention   · Improved after Ruelas     Dementia   Assessment & Plan    · Pt has attempted to pull out Ruelas in the past  · Supportive care and gentle re-direction, sleep hygiene  · Agitation that required restraint  Start low dose quetiapine         * Urinary retention   Assessment & Plan    · Discharged on 11/15 after passing voiding trial without Ruelas, however 11/16 required Ruelas placement in ED with return of acute urinary retention 2/2 BPH  · PCP removed Ruelas today and again pt presents with retention to the ED   · CTAP shows "Large left posterior urinary bladder diverticulum"  Confirmed by urinary bladder ultrasound  · Maintain Ruelas  · Family expressed concern managing catheter at home due to dementia  May benefit from higher level care              VTE Pharmacologic Prophylaxis:   Pharmacologic: Enoxaparin (Lovenox)  Mechanical VTE Prophylaxis in Place: Yes    Patient Centered Rounds: I have performed bedside rounds with nursing staff today  Discussions with Specialists or Other Care Team Provider:     Education and Discussions with Family / Patient:     Time Spent for Care: 20 minutes  More than 50% of total time spent on counseling and coordination of care as described above  Current Length of Stay: 1 day(s)    Current Patient Status: Inpatient   Certification Statement: The patient will continue to require additional inpatient hospital stay due to PT eval    Discharge Plan / Estimated Discharge Date: Waiting for PT/OT   May need rehab    Code Status: Level 3 - DNAR and DNI      Subjective:    Events noted  Patient became disoriented and educated last night and required restraints    Objective:     Vitals:   Temp (24hrs), Av 8 °F (36 6 °C), Min:97 5 °F (36 4 °C), Max:98 °F (36 7 °C)    Temp:  [97 5 °F (36 4 °C)-98 °F (36 7 °C)] 98 °F (36 7 °C)  HR:  [55-85] 85  Resp:  [16-18] 18  BP: (148-174)/(62-88) 162/88  SpO2:  [93 %-98 %] 96 %  Body mass index is 25 9 kg/m²  Input and Output Summary (last 24 hours): Intake/Output Summary (Last 24 hours) at 18 1319  Last data filed at 18 1301   Gross per 24 hour   Intake              120 ml   Output             3275 ml   Net            -3155 ml       Physical Exam:     Physical Exam   Constitutional: No distress  Eyes: Pupils are equal, round, and reactive to light  Conjunctivae and EOM are normal    Neck: Normal range of motion  Neck supple  Cardiovascular: Normal rate  Pulmonary/Chest: Effort normal and breath sounds normal  No respiratory distress  Abdominal: Soft  Bowel sounds are normal    Neurological:   Awake and disoriented   Skin: Skin is warm  He is not diaphoretic             Additional Data:     Labs:      Results from last 7 days  Lab Units 18  0034   WBC Thousand/uL 6 83   HEMOGLOBIN g/dL 13 4   HEMATOCRIT % 40 4   PLATELETS Thousands/uL 187   NEUTROS PCT % 52   LYMPHS PCT % 34   MONOS PCT % 9   EOS PCT % 3       Results from last 7 days  Lab Units 18  0713 18  0034   POTASSIUM mmol/L 4 5 4 7   CHLORIDE mmol/L 107 105   CO2 mmol/L 29 27   BUN mg/dL 20 24   CREATININE mg/dL 1 20 1 42*   CALCIUM mg/dL 8 2* 8 4   ALK PHOS U/L  --  248*   ALT U/L  --  17   AST U/L  --  13       Results from last 7 days  Lab Units 18  0034   INR  1 03         Recent Cultures (last 7 days):       Results from last 7 days  Lab Units 18  1835   URINE CULTURE  No Growth <1000 cfu/mL       Last 24 Hours Medication List:     Current Facility-Administered Medications:  acetaminophen 650 mg Oral Q6H PRN Lucia Avila PA-C   ALPRAZolam 0 25 mg Oral HS PRN Cathleen Olivo PA-C   amLODIPine 5 mg Oral Daily Nayeli Irby MD   bisacodyl 10 mg Rectal Daily PRN Lucia vAila PA-C   cyanocobalamin 100 mcg Oral Daily Lucia Avila PA-C   finasteride 5 mg Oral Daily Kristine Baeza PA-C   gabapentin 300 mg Oral TID Lucia Avila PA-C   hydrALAZINE 5 mg Intravenous Q6H PRN Nayeli Irby MD   melatonin 3 mg Oral HS Nayeli Irby MD   QUEtiapine 12 5 mg Oral HS Nayeli Irby MD   senna 1 tablet Oral HS Kristine Edwards PA-C   sodium chloride 2 g Oral Daily Lucia Avila PA-C   tamsulosin 0 4 mg Oral Daily With Dinner Lucia Avila PA-C   traMADol 50 mg Oral BID PRN Nayeli Irby MD        Today, Patient Was Seen By: Nayeli Irby MD    ** Please Note: This note has been constructed using a voice recognition system   **

## 2018-11-22 NOTE — ASSESSMENT & PLAN NOTE
· Cr baseline likely 1 1-1 2  · Likely post-obstructive uropathy 2/2 BPH and urinary retention   · Improved after Ruelas

## 2018-11-22 NOTE — PLAN OF CARE
Problem: OCCUPATIONAL THERAPY ADULT  Goal: Performs self-care activities at highest level of function for planned discharge setting  See evaluation for individualized goals  Treatment Interventions: ADL retraining, Functional transfer training, Endurance training, Patient/family training, Equipment evaluation/education, Compensatory technique education, Continued evaluation, Activityengagement  Equipment Recommended: Tub seat with back (will continue to assess at rehab)       See flowsheet documentation for full assessment, interventions and recommendations  Limitation: Decreased ADL status, Decreased endurance, Decreased cognition, Decreased Safe judgement during ADL, Decreased self-care trans     Assessment: Pt is a 81yo male [facility] on 11/20/2018  Pt presents w/ urinary retention and signficant PMH impactinig his occupational performance including dementia, hx throat ca  Pt not accurate historian upon evaluation, and unable to provide social history  Pt able to particiapte in conversation about work history and introduce his daughter present  Pt lives w/ signficant other in 2 SH w/ 0 ALEXIA from porch around back PTA  Pt uses RW for functional mobility and needs assist w/ ADL  Upon eval, pt completed bed mobiltiy w/ min A  Pt completed UBD w/ min A and grooming w/ S after set- up  Pt engaged in functional mobility using RW w/ min A  Pt able to follow one step functional directions  Pt presents w/ decreased activity tolerance, decreased cognition, decreased standing balance, generalized weakness / deconditioning impacting his I w/ dressing, bathing, functional mobility, functional transfers, activity engagement, oral hygiene  Pt would benefit from OT while in acute care to address deficits  From an OT perspective, pt would benefit from post acute rehab when medically stable for discharge from acute care to return to baseline level of I   Anticipate that pt will required increased assistance / S to return to PLOF   Will continue to follow     OT Discharge Recommendation: Other (Comment) (to post acute rehab)  OT - OK to Discharge:  (when medically stable)

## 2018-11-22 NOTE — PLAN OF CARE
SAFETY,RESTRAINT: NV/NON-SELF DESTRUCTIVE BEHAVIOR     Remains free of harm/injury (restraint for non violent/non self-detsructive behavior) Not Progressing     Returns to optimal restraint-free functioning Not Progressing

## 2018-11-22 NOTE — OCCUPATIONAL THERAPY NOTE
[address] Evaluation     Patient Name: Jada Joyner  DDTLN'E Date: 11/22/2018  Problem List  Patient Active Problem List   Diagnosis    Dementia    Urinary retention    [facility]    History of throat cancer    Acute-on-chronic kidney injury ([facility]    Microscopic hematuria     Past Medical History  Past Medical History:   Diagnosis Date    Gall stone     Hypotension     Throat cancer ([facility]     Urinary retention      Past Surgical History  Past Surgical History:   Procedure Laterality Date    TONSILLECTOMY           11/22/18 1023   Note Type   Note type Eval/Treat   Restrictions/Precautions   Weight Bearing Precautions Per Order No   Other Precautions Cognitive; Chair Alarm; Bed Alarm; Restraints;Multiple lines;Pain   Pain Assessment   Pain Assessment No/denies pain   Home Living   Type of Home House   Home Layout Two level;Performs ADLs on one level  (0 ALEXIA from porch around back)   Bathroom Shower/Tub Walk-in shower   Bathroom Toilet Standard   Bathroom Equipment Built-in shower seat;Grab bars in shower   Bathroom Accessibility Accessible via walker   [address] Walker;Grab bars   Additional Comments Pt not accurate historian upon evaluation due to dementia  Social history obtained from pt's daughter present during eval  Pt lives w/ signficant other in [address] w/ 0 ALEXIA from porch around back  Pt stays on first floor   Prior Function   Level of [place] Needs assistance with IADLs   Lives With Significant other   Receives Help From Family   ADL Assistance Needs assistance   IADLs Needs assistance   Falls in the last 6 months 0   Vocational Retired   Comments Pt uses RW for functional mobility and needs assist w/ ADL/ IADL at baseline from family (daughter) and signficant other   Lifestyle   Autonomy Pt needs assistance w/ ADL/ IADl using RW PTA  Reciprocal Relationships Supportive family   Pt's daughter present during eval and significant other arrived at end of eval Service to 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   [address] Will require further evaluation  ADL   Eating Assistance 5  Supervision/Setup  (seated at EOB to eat candy daughter provided)   Eating Deficit Setup; Increased time to complete   Grooming Assistance 5  Supervision/Setup  (seated at EOB)   Grooming Deficit Setup;Supervision/safety   UB Bathing Assistance Unable to assess   LB Bathing Assistance Unable to assess   UB Dressing Assistance 4  Minimal Assistance   UB Dressing Deficit Pull around back; Fasteners   LB Dressing Assistance Unable to assess   Bed Mobility   Supine to Sit 4  Minimal assistance   Additional items Assist x 1; Increased time required;Verbal cues   Sit to Supine 4  Minimal assistance   Additional items Assist x 1; Increased time required;Verbal cues;LE management   Additional Comments Pt returned to bed post eval w/ call bell in reach, posey restraints intact, bed alarm activated, and RN/ family present  Transfers   Sit to Stand 4  Minimal assistance  (mod A x1 2nd attempt after functional mobility due to fatigu)   Additional items Assist x 1; Increased time required;Verbal cues   Stand to Sit 4  Minimal assistance   Additional items Assist x 1; Increased time required;Verbal cues   Functional Mobility   Functional Mobility 4  Minimal assistance   Additional Comments cues for walker mgmt, placement   Additional items Rolling walker   Balance   Static Sitting Good   Dynamic Sitting Fair   Static Standing Fair -   Ambulatory Poor +   Activity Tolerance   Activity Tolerance Patient limited by fatigue   Medical Staff Made Aware spoke to PTLoren; PT student, Cristina   Nurse Made Aware spoke to RN, Jodie Phillips   RUE Assessment   RUE Assessment X  (able to complete ADL)   RUE Strength   RUE Overall Strength Within Functional Limits - able to perform ADL tasks with strength   LUE Assessment   LUE Assessment X  (able to complete ADL)   LUE Strength   LUE Overall Strength Within Functional Limits - able to perform ADL tasks with strength   Hand Function   Gross Motor Coordination Functional   Cognition   Overall Cognitive Status Impaired   Arousal/Participation Arousable; Cooperative   Attention Attends with cues to redirect   Orientation Level Oriented to person;Disoriented to situation;Disoriented to time;Disoriented to place   Memory Decreased short term memory;Decreased recall of biographical information;Decreased long term memory   Following Commands Follows one step commands with increased time or repetition   Comments Identified pt by full name, birthdate, and wrist band  Pt able to participate in converation about work history w/ min cues from his daughter  Pt not accurate historian upon eval, and unable to provide social history/ PLOF  Pt able to follow one step functional directions w/ increased time  Assessment   Limitation Decreased ADL status; Decreased endurance;Decreased cognition;Decreased Safe judgement during ADL;Decreased self-care trans   Assessment Pt is a 79yo male [facility]  Pt presents w/ urinary retention and signficant PMH impactinig his occupational performance including dementia, hx throat ca  Pt not accurate historian upon evaluation, and unable to provide social history  Pt able to particiapte in conversation about work history and introduce his daughter present  Pt lives w/ signficant other in [address] w/ 0 ALEXIA from porch around back PTA  Pt uses RW for functional mobility and needs assist w/ ADL  Upon eval, pt completed bed mobiltiy w/ min A  Pt completed UBD w/ min A and grooming w/ S after set- up  Pt engaged in functional mobility using RW w/ min A  Pt able to follow one step functional directions   Pt presents w/ decreased activity tolerance, decreased cognition, decreased standing balance, generalized weakness / deconditioning impacting his I w/ dressing, bathing, functional mobility, functional transfers, activity engagement, oral hygiene  Pt would benefit from OT while in acute care to address deficits  From an OT perspective, pt would benefit from post acute rehab when medically stable for discharge from acute care to return to baseline level of I  Anticipate that pt will required increased assistance / S to return to PLOF  Will continue to follow   Goals   Patient Goals none stated upon eval   Plan   Treatment Interventions ADL retraining;Functional transfer training; Endurance training;Patient/family training;Equipment evaluation/education; Compensatory technique education;Continued evaluation; Activityengagement   Goal Expiration Date 11/29/18   OT Frequency 2-3x/wk   Recommendation   OT Discharge Recommendation Other (Comment)  (to post acute rehab)   Equipment Recommended Tub seat with back  (will continue to assess at rehab)   OT - OK to Discharge (when medically stable)   Barthel Index   Feeding 10   Bathing 0   Grooming Score 0   Dressing Score 5   Bladder Score 0   Bowels Score 10   Toilet Use Score 5   Transfers (Bed/Chair) Score 10   Mobility (Level Surface) Score 0   Stairs Score 0   Barthel Index Score 40   Modified Lea Scale   Modified Robert Scale 4   Pt goals to be met in 7 days:  1  Pt will complete bed mobility supine <>sit w/ mod I  2  Pt will complete UBD w/ S after set- up   3  Pt will complete LBD w/ min A x1 after set- up  4  Pt will complete functional transfers to all surfaces using AD w/ S  5  Pt will demonstrate increased functional standing tolerance for at least 10 minutes to max I w/ functional mobility and ADL performance to return to PLOF w/ significant other  6  Pt will demonstrate good attention and consistently follow one step directions during ADL performance  7   Pt will demonstrate good attention and participation in continued evaluation to assess for DME needs to assist in safe discharge planning    Maria Eugenia Jeff OTR/L

## 2018-11-22 NOTE — PHYSICAL THERAPY NOTE
Physical Therapy Evaluation     11/22/18 1022   Note Type   Note type Eval only   Pain Assessment   Pain Assessment 0-10   Pain Score No Pain   Home Living   Type of Home House   Home Layout Two level; Able to live on main level with bedroom/bathroom  (0STE)   [address] [facility]  (RW)   Additional Comments information provided by pt's daughter   Prior Function   Level of [place] Needs assistance with IADLs; Needs assistance with ADLs and functional mobility   Lives With Spouse   Receives Help From Family   ADL Assistance Needs assistance   IADLs Needs assistance   Falls in the last 6 months 0   Comments information provided by pt's daughter   Restrictions/Precautions   Weight Bearing Precautions Per Order No   Other Precautions Cognitive; Chair Alarm; Bed Alarm; Restraints;Telemetry; Fall Risk   General   Family/Caregiver Present Yes  (daughter)   Cognition   Overall Cognitive Status Impaired   Arousal/Participation Responsive   Orientation Level Oriented to person   Memory Decreased long term memory;Decreased recall of biographical information;Decreased short term memory;Decreased recall of recent events;Decreased recall of precautions   Following Commands Follows one step commands with increased time or repetition   RLE Assessment   RLE Assessment (grossly 4/5; assessed with ambulation)   LLE Assessment   LLE Assessment (grossly 4/5; assessed with ambulation)   Coordination   Movements are Fluid and Coordinated 0   Bed Mobility   Supine to Sit 4  Minimal assistance   Additional items Assist x 1;HOB elevated; Increased time required;Verbal cues   Sit to Supine 4  Minimal assistance   Additional items Assist x 1; Increased time required;Verbal cues   Transfers   Sit to Stand 4  Minimal assistance   Additional items Assist x 1; Increased time required;Verbal cues   Stand to Sit 4  Minimal assistance   Additional items Assist x 1;Verbal cues   Ambulation/Elevation   Gait pattern Wide SAE; Decreased foot clearance; Short stride   Gait Assistance 4  Minimal assist   Additional items Assist x 1;Verbal cues; Tactile cues   Assistive Device Rolling walker   Distance 30ft x2 with occasional standing rest break   Stair Management Assistance Not tested   Balance   Static Sitting Good   Dynamic Sitting Fair +   Static Standing Fair   Dynamic Standing Fair   Ambulatory Fair -   Endurance Deficit   Endurance Deficit Yes   Activity Tolerance   Activity Tolerance Patient tolerated treatment well;Patient limited by fatigue   Nurse Made Aware yes Ernestine Watkins RN   Assessment   Prognosis Fair   Problem List Decreased strength;Decreased endurance; Impaired balance;Decreased mobility; Decreased coordination;Decreased cognition; Impaired judgement;Decreased safety awareness   Assessment Pt is a 81yo male admitted to [facility] on 11/20/18 with urinary retention and associated LEXIE  Pt was recently seen in the ED for urinary retention and had a Ruelas placed on 11/16; Ruelas removed by PCP on 11/20/18  Pt's PMH includes history of throat cancer and dementia  When PT entered room, pt was restrained to the bed  During session, pt performed bed mobility, transfers and ambulation as outlined above  Pt required min assist throughout session and needed frequent verbal and tactile cues during ambulation; pt appeared lethargic throughout session and when asked how he was feeling, pt stated "not good, but its psychological " Per nursing, pt was returned to bed to allow restraints to be placed; pt's wrists remained unrestrained at request of his daughter, who says she will be in the room for the next few hours  Pt is moderate complexity due to readmission, PMH and dementia  Pt will benefit from skilled PT services to improve bed mobility, transfers, ambulation, balance and endurance  PT recommends pt be D/C to IP rehabilitation when medically stable     Barriers to Discharge None   Goals   Patient Goals none stated secondary to cog deficit   STG Expiration Date 12/06/18   Short Term Goal #1 pt will be I with bed mobility; pt will perform transfers with supervision; pt will ambulate 200ft with supervision using least restrictive device; pt will improve static standing balance to "good"; pt will improve BLE strength by 1/2 to 1 grade   Treatment Day 0   Plan   Treatment/Interventions Functional transfer training;LE strengthening/ROM; Therapeutic exercise; Endurance training;Patient/family training;Equipment eval/education; Bed mobility;Gait training   PT Frequency (3-5x/wk)   Recommendation   Recommendation (IP rehabilitation)   PT - OK to Discharge Yes  (to IP rehabilitation when medically stable)   Modified [place] Scale   Modified [place] Scale 4   Barthel Index   Feeding 5   Bathing 0   Grooming Score 0   Dressing Score 5   Bladder Score 0   Bowels Score 10   Toilet Use Score 5   Transfers (Bed/Chair) Score 10   Mobility (Level Surface) Score 10   Stairs Score 0   Barthel Index Score 45     Cristina Elliott, SPT

## 2018-11-22 NOTE — PLAN OF CARE
Problem: PHYSICAL THERAPY ADULT  Goal: Performs mobility at highest level of function for planned discharge setting  See evaluation for individualized goals  Treatment/Interventions: Functional transfer training, LE strengthening/ROM, Therapeutic exercise, Endurance training, Patient/family training, Equipment eval/education, Bed mobility, Gait training          See flowsheet documentation for full assessment, interventions and recommendations  Prognosis: Fair  Problem List: Decreased strength, Decreased endurance, Impaired balance, Decreased mobility, Decreased coordination, Decreased cognition, Impaired judgement, Decreased safety awareness  Assessment: Pt is a 81yo male [place] [facility] [place] [facility] with urinary retention and associated LEXIE  Pt was recently seen in the ED for urinary retention and had a Ruelas placed on 11/16; Ruelas removed by PCP on 11/20/18  Pt's PMH includes history of throat cancer and dementia  When PT entered room, pt was restrained to the bed  During session, pt performed bed mobility, transfers and ambulation as outlined above  Pt required min assist throughout session and needed frequent verbal and tactile cues during ambulation; pt appeared lethargic throughout session and when asked how he was feeling, pt stated "not good, but its psychological " Per nursing, pt was returned to bed to allow restraints to be placed; pt's wrists remained unrestrained at request of his daughter, who says she will be in the room for the next few hours  Pt is moderate complexity due to readmission, PMH and dementia  Pt will benefit from skilled PT services to improve bed mobility, transfers, ambulation, balance and endurance  PT recommends pt be D/C to IP rehabilitation when medically stable  Barriers to Discharge: None     Recommendation:  (IP rehabilitation)     PT - OK to Discharge: Yes (to IP rehabilitation when medically stable)    See flowsheet documentation for full assessment

## 2018-11-23 PROCEDURE — 99232 SBSQ HOSP IP/OBS MODERATE 35: CPT | Performed by: INTERNAL MEDICINE

## 2018-11-23 PROCEDURE — 97116 GAIT TRAINING THERAPY: CPT

## 2018-11-23 PROCEDURE — 99233 SBSQ HOSP IP/OBS HIGH 50: CPT | Performed by: UROLOGY

## 2018-11-23 PROCEDURE — 97530 THERAPEUTIC ACTIVITIES: CPT

## 2018-11-23 RX ADMIN — GABAPENTIN 300 MG: 300 CAPSULE ORAL at 17:59

## 2018-11-23 RX ADMIN — GABAPENTIN 300 MG: 300 CAPSULE ORAL at 10:37

## 2018-11-23 RX ADMIN — FINASTERIDE 5 MG: 5 TABLET, FILM COATED ORAL at 10:37

## 2018-11-23 RX ADMIN — ALPRAZOLAM 0.25 MG: 0.25 TABLET ORAL at 18:00

## 2018-11-23 RX ADMIN — GABAPENTIN 300 MG: 300 CAPSULE ORAL at 20:02

## 2018-11-23 RX ADMIN — MELATONIN TAB 3 MG 3 MG: 3 TAB at 20:01

## 2018-11-23 RX ADMIN — TAMSULOSIN HYDROCHLORIDE 0.4 MG: 0.4 CAPSULE ORAL at 18:00

## 2018-11-23 RX ADMIN — ENOXAPARIN SODIUM 40 MG: 40 INJECTION SUBCUTANEOUS at 10:38

## 2018-11-23 RX ADMIN — SODIUM CHLORIDE TAB 1 GM 2 G: 1 TAB at 10:38

## 2018-11-23 RX ADMIN — VITAM B12 100 MCG: 100 TAB at 10:39

## 2018-11-23 RX ADMIN — TRAMADOL HYDROCHLORIDE 50 MG: 50 TABLET, COATED ORAL at 20:01

## 2018-11-23 RX ADMIN — AMLODIPINE BESYLATE 5 MG: 5 TABLET ORAL at 09:00

## 2018-11-23 RX ADMIN — QUETIAPINE FUMARATE 12.5 MG: 25 TABLET ORAL at 20:02

## 2018-11-23 NOTE — ASSESSMENT & PLAN NOTE
· Pt has attempted to pull out Ruelas in the past  · Supportive care and gentle re-direction, sleep hygiene  · Agitation improved   Started low dose quetiapine

## 2018-11-23 NOTE — SOCIAL WORK
LOS 2  Patient is not a bundle or a readmission  CM spoke with patient's  and daughter at the bedside  Patient lives in [address] with his  of 32 years in a bilevel home  There are 0 ALEXIA home  Patient uses a walker at home  Patient has a wheelchair at home and a showerchair  Patient needs assistance for all ADLs  Patient has a history of inpatient rehab at [place]  Patient is currently receiving [employer] [facility] 6 hours/week at home  Patient has an advance directive/living will  Copy requested from family  Patient's POA is his daughter and   Lucretia Dotson, , phone number is   Patient is retired and family transports to appointments  Patient is a [employer]   Family denies history of alcohol/drug abuse  Patient does have a history of dementia  [facility]  Patient has prescription insurance and is able to afford his medications  CM spoke with patient's  and daughter at the bedside re: [facility]  Patient's  Lucretia Ernsto would like patient to return home with more assistance from [facility]  Patient has a saleh catheter that per family patient has pulled out numerous times  Lucretia Dotson stated she can handle patient at home, but needs more assistance due to the saleh catheter  Patient's daughter provided CM [facility] phone number for [facility]  [phone]  Daughter and  agreeable to CM contacting the [place]  Patient's daughter is agreeable to [facility] referral being placed for STR placement  Daughter spoke with Gem from admissions department  CM left a voice message for Matilde Valdes from the [place] re: HHA services at home  Waiting for callback      CM reviewed discharge planning process including the following: identifying caregivers at home, preference for d/c planning needs, [facility] to [facility] program, availability of treatment team to discuss questions or concerns patient and/or family may have regarding diagnosis, plan of care, old or new medications and discharge planning   CM will continue to follow for care coordination and update assessment as necessary

## 2018-11-23 NOTE — SOCIAL WORK
CM spoke with Olamide from the [place]  Patient may be eligible for an additional 4 hours  The maximum for HHA hours/week is 10  Patient and family may benefit from in home respite  In home respite is 30 days/year, 6 hours/day total  Lori Bravo requesting PT/OT notes to review patient for additional HHA hours  ESTHER faxed information to

## 2018-11-23 NOTE — PLAN OF CARE
Problem: PHYSICAL THERAPY ADULT  Goal: Performs mobility at highest level of function for planned discharge setting  See evaluation for individualized goals  Treatment/Interventions: Functional transfer training, LE strengthening/ROM, Therapeutic exercise, Endurance training, Patient/family training, Equipment eval/education, Bed mobility, Gait training          See flowsheet documentation for full assessment, interventions and recommendations  Outcome: Progressing  Prognosis: Fair  Problem List: Decreased strength, Decreased endurance, Impaired balance, Decreased mobility, Decreased coordination, Decreased cognition, Impaired judgement, Decreased safety awareness  Assessment: Patient fatigued however agreeable to participate in therapy session after provided encouragement from myself and family  Patient consistent with need of min ax 1 for supine<>sit and sit<>stand transfers  Requires verbal instruction for hand placement and body positioning with poor carry over during session  Patient able to ambulate increased gati distance with roller walker and chair follow  Presents with flexed posture with ability to correct and maintain short period with verbal cues  Requires min a x 1 for ambulation with verbal instruction for walker management and obstacle avoidance  Patient incontinent of bowel during session requring additional type for hygenie and multiple sit to stand trails  Standing toelrance at toilet with grab bar x 4 minutes with CGA and PCA present for hygenie  Continue to focus on OOB mobility tasks with emphasis on ambulation and transfer progression as appropriate  Barriers to Discharge: None     Recommendation:  (IP Rehabilitation)     PT - OK to Discharge: Yes (to IP rehabilitation when medically stable)    See flowsheet documentation for full assessment

## 2018-11-23 NOTE — PROGRESS NOTES
Progress Note Alanna Rider 1926, 80 y o  male MRN: [id number]    Unit/Bed#: -01 Encounter: [id number]    Primary Care Provider: Raiza Myers DO   Date and time admitted to hospital: 2018 11:58 PM        * Urinary retention   Assessment & Plan    · Discharged on 11/15 after passing voiding trial without Ruelas, however  required Ruelas placement in ED with return of acute urinary retention 2/2 BPH  · PCP removed Ruelas today and again pt presents with retention to the ED   · CTAP shows "Large left posterior urinary bladder diverticulum"  Confirmed by urinary bladder ultrasound  · Maintain Ruelas  · Family expressed concern managing catheter at home due to dementia  May benefit from higher level care     Acute kidney injury [facility])   Assessment & Plan    · Cr baseline likely 1 1-1 2  · Likely post-obstructive uropathy 2/2 BPH and urinary retention   · Improved after Ruelas     Microscopic hematuria   Assessment & Plan    · See primary plan      History of throat cancer   Assessment & Plan    · No issues currently, follows with ENT at [place]    · Pt has attempted to pull out Ruelas in the past  · Supportive care and gentle re-direction, sleep hygiene  · Agitation improved  Started low dose quetiapine               VTE Pharmacologic Prophylaxis:   Pharmacologic: Enoxaparin (Lovenox)  Mechanical VTE Prophylaxis in Place: Yes    Time Spent for Care: 20 minutes  More than 50% of total time spent on counseling and coordination of care as described above  Current Length of Stay: 2 day(s)    Current Patient Status: Inpatient   Certification Statement: The patient will continue to require additional inpatient hospital stay due to above    Discharge Plan / Estimated Discharge Date: When rehab is available    Code Status: Level 3 - DNAR and DNI      Subjective:   No issue overnight   He appears more calm    Objective:     Vitals:   Temp (24hrs), Av 5 °F (36 9 °C), Min:98 2 °F (36 8 °C), Max:98 7 °F (37 1 °C)    Temp:  [98 2 °F (36 8 °C)-98 7 °F (37 1 °C)] 98 7 °F (37 1 °C)  HR:  [67-73] 68  Resp:  [18] 18  BP: (122-162)/(60-78) 122/60  SpO2:  [95 %] 95 %  Body mass index is 25 9 kg/m²  Input and Output Summary (last 24 hours): Intake/Output Summary (Last 24 hours) at 11/23/18 1522  Last data filed at 11/23/18 1500   Gross per 24 hour   Intake              620 ml   Output              700 ml   Net              -80 ml       Physical Exam:     Physical Exam   Constitutional: No distress  Eyes: Pupils are equal, round, and reactive to light  Conjunctivae are normal    Neck: Normal range of motion  Neck supple  Cardiovascular: Normal rate and regular rhythm  Pulmonary/Chest: Effort normal and breath sounds normal  No respiratory distress  Abdominal: Soft  Bowel sounds are normal  He exhibits no distension  Musculoskeletal: He exhibits no edema  Neurological:   awake   Skin: Skin is warm  He is not diaphoretic           Additional Data:     Labs:      Results from last 7 days  Lab Units 11/21/18  0034   WBC Thousand/uL 6 83   HEMOGLOBIN g/dL 13 4   HEMATOCRIT % 40 4   PLATELETS Thousands/uL 187   NEUTROS PCT % 52   LYMPHS PCT % 34   MONOS PCT % 9   EOS PCT % 3       Results from last 7 days  Lab Units 11/21/18  0713 11/21/18  0034   POTASSIUM mmol/L 4 5 4 7   CHLORIDE mmol/L 107 105   CO2 mmol/L 29 27   BUN mg/dL 20 24   CREATININE mg/dL 1 20 1 42*   CALCIUM mg/dL 8 2* 8 4   ALK PHOS U/L  --  248*   ALT U/L  --  17   AST U/L  --  13       Results from last 7 days  Lab Units 11/21/18  0034   INR  1 03         Recent Cultures (last 7 days):       Results from last 7 days  Lab Units 11/16/18  1835   URINE CULTURE  No Growth <1000 cfu/mL       Last 24 Hours Medication List:     Current Facility-Administered Medications:  acetaminophen 650 mg Oral Q6H PRN [employer], PA-C   ALPRAZolam 0 25 mg Oral HS PRN Cathleen Olivo PA-C   amLODIPine 5 mg Oral Daily Michael Riggins MD   bisacodyl 10 mg Rectal Daily PRN Pj Lemons PA-C   cyanocobalamin 100 mcg Oral Daily Pj Lemons PA-C   enoxaparin 40 mg Subcutaneous Q24H [facility] Michael Riggins MD   finasteride 5 mg Oral Daily Kristine Sparrow PA-C   gabapentin 300 mg Oral TID Pj Lemons PA-C   hydrALAZINE 5 mg Intravenous Q6H PRN Michael Riggins MD   melatonin 3 mg Oral HS Michael Riggins MD   QUEtiapine 12 5 mg Oral HS Michael Riggins MD   senna 1 tablet Oral HS Kristine Edwards PA-C   sodium chloride 2 g Oral Daily Pj Lemons PA-C   tamsulosin 0 4 mg Oral Daily With Dinner Pj Lemons PA-C   traMADol 50 mg Oral BID PRN Michael Riggins MD        Today, Patient Was Seen By: Michael Riggins MD    ** Please Note: This note has been constructed using a voice recognition system   **

## 2018-11-23 NOTE — SOCIAL WORK
ESTHER spoke with Cedric from [facility]  Cedric is verifying patient's insurance and will update CM via telephone

## 2018-11-23 NOTE — PROGRESS NOTES
Noticed no urine in saleh bag, last charted on at 2040 emptied for 400ml  Bladder scanned x66ml  Flushed with 20ml nss  Awaiting phone call back from slim  Will continue to monitor

## 2018-11-23 NOTE — PHYSICAL THERAPY NOTE
PHYSICAL THERAPY NOTE    Patient Name: Julianna Loja  [id number] Date: 2018 1005   Pain Assessment   Pain Assessment 0-10   Pain Score No Pain   Restrictions/Precautions   Weight Bearing Precautions Per Order No   Other Precautions Cognitive; Chair Alarm; Bed Alarm; Restraints;Multiple lines;Pain   General   Family/Caregiver Present Yes  (daughter and spouse)   Subjective   Subjective Patient supine in bed and is agreeable to therapy session  Patient identifers from name &   Bed Mobility   Supine to Sit 4  Minimal assistance   Additional items Assist x 1;HOB elevated; Bedrails; Increased time required;Verbal cues;LE management   Sit to Supine 4  Minimal assistance   Additional items Assist x 1; Increased time required;Verbal cues;LE management;HOB elevated   Additional Comments Patient supine in bed post session with call bell and belongings in reach, bed alarm engaged, family and PCA present  Transfers   Sit to Stand 4  Minimal assistance   Additional items Assist x 1; Armrests; Increased time required;Verbal cues   Stand to Sit 4  Minimal assistance   Additional items Assist x 1; Armrests; Increased time required;Verbal cues   Toilet transfer 4  Minimal assistance   Additional items Assist x 1; Increased time required;Standard toilet  (R grab bar)   Ambulation/Elevation   Gait pattern Wide SAE; Decreased foot clearance; Short stride   Gait Assistance 4  Minimal assist   Additional items Assist x 1;Verbal cues; Tactile cues   Assistive Device Rolling walker   Distance 20' x2, 80' x1   Balance   Static Sitting Good   Dynamic Sitting Fair   Static Standing Fair -   Dynamic Standing Fair   Ambulatory Poor +   Endurance Deficit   Endurance Deficit Yes   Activity Tolerance   Activity Tolerance Patient limited by fatigue   Nurse Made Aware Spoke to KEVIN Pérez   Assessment   Prognosis Fair   Problem List Decreased strength;Decreased endurance; Impaired balance;Decreased mobility; Decreased coordination;Decreased cognition; Impaired judgement;Decreased safety awareness   Assessment Patient fatigued however agreeable to participate in therapy session after provided encouragement from myself and family  Patient consistent with need of min ax 1 for supine<>sit and sit<>stand transfers  Requires verbal instruction for hand placement and body positioning with poor carry over during session  Patient able to ambulate increased gati distance with roller walker and chair follow  Presents with flexed posture with ability to correct and maintain short period with verbal cues  Requires min a x 1 for ambulation with verbal instruction for walker management and obstacle avoidance  Patient incontinent of bowel during session requring additional type for hygenie and multiple sit to stand trails  Standing toelrance at toilet with grab bar x 4 minutes with CGA and PCA present for hygenie  Continue to focus on OOB mobility tasks with emphasis on ambulation and transfer progression as appropriate  Goals   Patient Goals none stated   STG Expiration Date 12/06/18   Treatment Day 1   Plan   Treatment/Interventions Functional transfer training;LE strengthening/ROM; Therapeutic exercise; Endurance training;Patient/family training;Equipment eval/education; Bed mobility;Gait training   PT Frequency (3-5x/week)   Recommendation   Recommendation (IP Rehabilitation)       Maryjane Degree, PTA

## 2018-11-23 NOTE — SOCIAL WORK
CM spoke with Olamide from the [place]  Patient is approved for 10 hrs/wk with [facility]  Patient's family would need to contact the [place] if interested in respite care  Cedric is unable to accept patient at [facility]  Unable to clarify if facility accepts insurance  CM spoke with Balwinder Mcqueen from [employer]  Reference number [id number]  The following facilities are in network with patient's insurance  [address] [place] [address], [facility], [facility], [facility] and [facility]  CM reviewed choices with patient's  at bedside   updated that patient has been approved for 10 hrs/ week HHA   would like CM to contact patient's daughter Dotty Rodrigez  CM spoke with patient's daughter Dotty Rodrigez would like referrals sent to [facility] and [facility]  Patient's daughter updated that patient has been approved for 10 hrs wk for HHA through the [place]  Dotty Rodrigez aware of the respite 30 day program with the [place]  CM will send referrals for patient

## 2018-11-23 NOTE — PLAN OF CARE
Problem: DISCHARGE PLANNING - CARE MANAGEMENT  Goal: Discharge to post-acute care or home with appropriate resources  INTERVENTIONS:  - Conduct assessment to determine patient/family and health care team treatment goals, and need for post-acute services based on payer coverage, community resources, and patient preferences, and barriers to discharge  - Address psychosocial, clinical, and financial barriers to discharge as identified in assessment in conjunction with the patient/family and health care team  - Arrange appropriate level of post-acute services according to patients   needs and preference and payer coverage in collaboration with the physician and health care team  - Communicate with and update the patient/family, physician, and health care team regarding progress on the discharge plan  - Arrange appropriate transportation to post-acute venues  Outcome: Progressing  LOS 2  Patient is not a bundle or a readmission  ESTHER spoke with patient's  and daughter at the bedside  Patient lives in [facility] [address] with his  of 32 years in a bilevel home  There are 0 ALEXIA home  Patient uses a walker at home  Patient has a wheelchair at home and a showerchair  Patient needs assistance for all ADLs  Patient has a history of inpatient rehab at [facility]  Patient is currently receiving [employer] 6 hours/week at home  Patient has an advance directive/living will  Copy requested from family  Patient's POA is his daughter and   Carline Caldwell, , phone number is   Patient is retired and family transports to appointments  Patient is a [employer]   Family denies history of alcohol/drug abuse  Patient does have a history of dementia  [facility]  Patient has prescription insurance and is able to afford his medications  ESTHER spoke with patient's  and daughter at the bedside re: ZACHARY   Patient's  Carline Caldwell would like patient to return home with more assistance from [facility]  Patient has a saleh catheter that per family patient has pulled out numerous times  Kericha Ernsto stated she can handle patient at home, but needs more assistance due to the saleh catheter  Patient's daughter provided CM [facility] phone number for [facility]  [phone]  Daughter and  agreeable to CM contacting the [place]  Patient's daughter is agreeable to [facility] referral being placed for STR placement  Daughter spoke with Claerance from admissions department  CM left a voice message for Matilde Valdes from the [place] re: HHA services at home  Waiting for callback  CM reviewed discharge planning process including the following: identifying caregivers at home, preference for d/c planning needs, [facility] to [facility] program, availability of treatment team to discuss questions or concerns patient and/or family may have regarding diagnosis, plan of care, old or new medications and discharge planning   CM will continue to follow for care coordination and update assessment as necessary

## 2018-11-23 NOTE — PROGRESS NOTES
Pt only had 50ml nss overnight  Bladder scan 347ml  Irrigated with 20ml nss and did not get any return  Notified Ferrari Means and re-consulted urology

## 2018-11-23 NOTE — CONSULTS
UROLOGY PROGRESS NOTE   Patient Identifiers: Edgar Garcia (MRN [id number])  Date of Service: 11/23/2018      Assessment:   1) Bladder outlet obstruction secondary to significant prostatomegaly noted on recent CT  2) Urinary retention secondary to #1  3) Bladder diverticulum, due to elevated bladder pressures given incomplete bladder emptying    Plan:   - catheter was irrigated  Vaseline ointment was placed around the urethral meatus to facilitate movement of the catheter  - decreased output likely associated to patient's bladder diverticulum and patient's catheter position  Should urinary output noted to be decreased, recommendations for ambulation and catheter repositioning  Catheter can also be hand irrigated as needed to maintain patency  Should catheter not irrigate appropriately, catheter exchange may be required on occasion   - encourage proper hydration, ambulation, and avoidance of constipation   - Continue to previously recommended plan:   Maintain Ruelas catheter  Continue medical optimization and proceed with transfer to  short-term rehab   Ruelas catheter will be removed by skilled nursing at rehab facility   Begin CIC at facility if patient does not void t i d    Follow-up with attending physician at outpatient visit to to discuss  and determine long term elimination management        Subjective:     Male patient seen on this consultation for urinary retention  Patient originally was discharged 11/15 with a Ruelas catheter in failed a voiding trial 11 16  He had a brief episode of gross hematuria with catheter we insertion which resolved spontaneously  Imaging had revealed a large bladder diverticulum versus peritoneal cyst   Patient did not demonstrate any evidence of obstructive uropathy  He was noted significant prostatomegaly on CT  Recommendations were to maintain Ruelas catheter to gravity drainage    Given patient's age, he is likely not a good candidate for surgical intervention of his prostatomegaly in the form of TURP  Possible consideration for Urolift depending on the anatomy, however may not cause complete bladder emptying given presence of large diverticulum  Patient was to undergo voiding trial at short-term rehab  The facility was to perform clean intermittent catheterization if patient unable to void  Patient was to follow up outpatient with Urology for further evaluation of urinary retention  Last night, patient was noted to have decreased urinary output through his Ruelas catheter  Bladder scan did reveal 347 mL  Ruelas catheter had been irrigated without much return  Catheter has been reportedly draining better since  Patient denies any suprapubic discomfort  Does note some occasional discomfort with the Ruleas catheter          Objective:     VITALS:    Vitals:    11/23/18 0733   BP: 122/60   Pulse: 68   Resp: 18   Temp: 98 7 °F (37 1 °C)   SpO2: 95%       INS & OUTS:  [unfilled]    LABS:  Lab Results   Component Value Date    HGB 13 4 11/21/2018    HCT 40 4 11/21/2018    WBC 6 83 11/21/2018     11/21/2018   ]    Lab Results   Component Value Date     02/24/2014    K 4 5 11/21/2018     11/21/2018    CO2 29 11/21/2018    BUN 20 11/21/2018    CREATININE 1 20 11/21/2018    CALCIUM 8 2 (L) 11/21/2018    GLUCOSE 114 02/24/2014   ]    INPATIENT MEDS:    Current Facility-Administered Medications:     acetaminophen (TYLENOL) tablet 650 mg, 650 mg, Oral, Q6H PRN, Jesse Fulling, PA-C, 650 mg at 11/21/18 1210    ALPRAZolam (XANAX) tablet 0 25 mg, 0 25 mg, Oral, HS PRN, Cathleen Olivo PA-C, 0 25 mg at 11/21/18 1741    amLODIPine (NORVASC) tablet 5 mg, 5 mg, Oral, Daily, Rosio Gregory MD, 5 mg at 11/22/18 1022    bisacodyl (DULCOLAX) rectal suppository 10 mg, 10 mg, Rectal, Daily PRN, Jesse Fulling, PA-C    cyanocobalamin (VITAMIN B-12) tablet 100 mcg, 100 mcg, Oral, Daily, Jesse Fulling, PA-C, 100 mcg at 11/22/18 1024   enoxaparin (LOVENOX) subcutaneous injection 40 mg, 40 mg, Subcutaneous, Q24H [facility], Nayeli Irby MD, 40 mg at 11/22/18 1413    finasteride (PROSCAR) tablet 5 mg, 5 mg, Oral, Daily, Lucia Avila PA-C, 5 mg at 11/22/18 1022    gabapentin (NEURONTIN) capsule 300 mg, 300 mg, Oral, TID, Lucia Avila PA-C, 300 mg at 11/22/18 2055    hydrALAZINE (APRESOLINE) injection 5 mg, 5 mg, Intravenous, Q6H PRN, Nayeli Irby MD    melatonin tablet 3 mg, 3 mg, Oral, HS, Nayeli Irby MD, 3 mg at 11/22/18 2104    QUEtiapine (SEROquel) tablet 12 5 mg, 12 5 mg, Oral, HS, Nayeli Irby MD, 12 5 mg at 11/22/18 2104    senna (SENOKOT) tablet 8 6 mg, 1 tablet, Oral, HS, Lucia Avila PA-C, 8 6 mg at 11/22/18 2055    sodium chloride tablet 2 g, 2 g, Oral, Daily, JESSICA Mckay-C, 2 g at 11/22/18 1023    tamsulosin (FLOMAX) capsule 0 4 mg, 0 4 mg, Oral, Daily With Dinner, Lucia Avila PA-C, 0 4 mg at 11/22/18 1636    traMADol (ULTRAM) tablet 50 mg, 50 mg, Oral, BID PRN, Nayeli Irby MD, 50 mg at 11/21/18 2009      Physical Exam:   /60 (BP Location: Right arm)   Pulse 68   Temp 98 7 °F (37 1 °C) (Oral)   Resp 18   Wt 94 kg (207 lb 3 7 oz)   SpO2 95%   BMI 25 90 kg/m²   GEN: no acute distress    RESP: breathing comfortably with no accessory muscle use    ABD: soft, non-tender, non-distended   EXT: no significant peripheral edema   DENT:  In place draining clear yellow urine  Hand irrigated 120 cc water  Patient did have mild bladder spasms during irrigation  Return of approximately 80 cc clear fluid

## 2018-11-24 PROCEDURE — 99232 SBSQ HOSP IP/OBS MODERATE 35: CPT | Performed by: INTERNAL MEDICINE

## 2018-11-24 RX ORDER — QUETIAPINE FUMARATE 25 MG/1
25 TABLET, FILM COATED ORAL
Status: DISCONTINUED | OUTPATIENT
Start: 2018-11-25 | End: 2018-11-28 | Stop reason: HOSPADM

## 2018-11-24 RX ORDER — ALPRAZOLAM 0.25 MG/1
0.25 TABLET ORAL DAILY PRN
Status: DISCONTINUED | OUTPATIENT
Start: 2018-11-24 | End: 2018-11-26

## 2018-11-24 RX ADMIN — GABAPENTIN 300 MG: 300 CAPSULE ORAL at 08:12

## 2018-11-24 RX ADMIN — AMLODIPINE BESYLATE 5 MG: 5 TABLET ORAL at 08:12

## 2018-11-24 RX ADMIN — ENOXAPARIN SODIUM 40 MG: 40 INJECTION SUBCUTANEOUS at 08:12

## 2018-11-24 RX ADMIN — GABAPENTIN 300 MG: 300 CAPSULE ORAL at 20:02

## 2018-11-24 RX ADMIN — VITAM B12 100 MCG: 100 TAB at 08:12

## 2018-11-24 RX ADMIN — MELATONIN TAB 3 MG 3 MG: 3 TAB at 20:02

## 2018-11-24 RX ADMIN — QUETIAPINE FUMARATE 25 MG: 25 TABLET ORAL at 20:07

## 2018-11-24 RX ADMIN — SODIUM CHLORIDE TAB 1 GM 2 G: 1 TAB at 08:12

## 2018-11-24 RX ADMIN — TAMSULOSIN HYDROCHLORIDE 0.4 MG: 0.4 CAPSULE ORAL at 15:41

## 2018-11-24 RX ADMIN — ALPRAZOLAM 0.25 MG: 0.25 TABLET ORAL at 14:20

## 2018-11-24 RX ADMIN — FINASTERIDE 5 MG: 5 TABLET, FILM COATED ORAL at 08:12

## 2018-11-24 RX ADMIN — SENNOSIDES 8.6 MG: 8.6 TABLET, FILM COATED ORAL at 20:02

## 2018-11-24 RX ADMIN — GABAPENTIN 300 MG: 300 CAPSULE ORAL at 15:41

## 2018-11-24 NOTE — PROGRESS NOTES
Progress Note Vangie Chang 1926, 80 y o  male MRN: [id number]    Unit/Bed#: -01 Encounter: [id number]    Primary Care Provider: Ar Hendrickson DO   Date and time admitted to hospital: 2018 11:58 PM        * Urinary retention   Assessment & Plan    · Discharged on 11/15 after passing voiding trial without Ruelas, however  required Ruelas placement in ED with return of acute urinary retention 2/2 BPH  · PCP removed Ruelas today and again pt presents with retention to the ED   · CTAP shows "Large left posterior urinary bladder diverticulum"  Confirmed by urinary bladder ultrasound  · Maintain Ruelas  · Family expressed concern managing catheter at home due to dementia  May benefit from higher level care     Acute kidney injury [facility])   Assessment & Plan    · Cr baseline likely 1 1-1 2  · Likely post-obstructive uropathy 2/2 BPH and urinary retention   · Improved after Ruelas     History of throat cancer   Assessment & Plan    · No issues currently, follows with ENT at [place]    · Pt has attempted to pull out Ruelas in the past  · Supportive care and gentle re-direction, sleep hygiene  · Agitation improved  Started low dose quetiapine             VTE Pharmacologic Prophylaxis:   Pharmacologic: Enoxaparin (Lovenox)  Mechanical VTE Prophylaxis in Place: Yes      Current Patient Status: Inpatient   Certification Statement: The patient will continue to require additional inpatient hospital stay due to above    Discharge Plan / Estimated Discharge Date:     Code Status: Level 3 - DNAR and DNI      Subjective:   No issue overnight   Family is happy and states that Seroquel is helping his agitation     Objective:     Vitals:   Temp (24hrs), Av 8 °F (37 1 °C), Min:98 5 °F (36 9 °C), Max:99 1 °F (37 3 °C)    Temp:  [98 5 °F (36 9 °C)-99 1 °F (37 3 °C)] 98 5 °F (36 9 °C)  HR:  [61-68] 61  Resp:  [18] 18  BP: (162-170)/(80-95) 170/80  SpO2:  [95 %-97 %] 95 %  Body mass index is 25 9 kg/m²  Input and Output Summary (last 24 hours): Intake/Output Summary (Last 24 hours) at 11/24/18 1245  Last data filed at 11/23/18 2200   Gross per 24 hour   Intake              320 ml   Output              400 ml   Net              -80 ml       Physical Exam:     Physical Exam   Constitutional: He is oriented to person, place, and time  No distress  Eyes: Pupils are equal, round, and reactive to light  Conjunctivae are normal    Neck: Normal range of motion  Neck supple  Cardiovascular: Normal rate and regular rhythm  Pulmonary/Chest: Effort normal and breath sounds normal  No respiratory distress  Abdominal: Soft  Bowel sounds are normal  He exhibits no distension  Musculoskeletal: He exhibits no edema  Neurological: He is alert and oriented to person, place, and time  Skin: Skin is warm and dry  He is not diaphoretic  Psychiatric: He has a normal mood and affect             Additional Data:     Labs:      Results from last 7 days  Lab Units 11/21/18  0034   WBC Thousand/uL 6 83   HEMOGLOBIN g/dL 13 4   HEMATOCRIT % 40 4   PLATELETS Thousands/uL 187   NEUTROS PCT % 52   LYMPHS PCT % 34   MONOS PCT % 9   EOS PCT % 3       Results from last 7 days  Lab Units 11/21/18  0713 11/21/18  0034   POTASSIUM mmol/L 4 5 4 7   CHLORIDE mmol/L 107 105   CO2 mmol/L 29 27   BUN mg/dL 20 24   CREATININE mg/dL 1 20 1 42*   CALCIUM mg/dL 8 2* 8 4   ALK PHOS U/L  --  248*   ALT U/L  --  17   AST U/L  --  13       Results from last 7 days  Lab Units 11/21/18  0034   INR  1 03         Recent Cultures (last 7 days):           Last 24 Hours Medication List:     Current Facility-Administered Medications:  acetaminophen 650 mg Oral Q6H PRN Minus MEGAN Carroll   ALPRAZolam 0 25 mg Oral HS PRN Cathleen Olivo PA-C   amLODIPine 5 mg Oral Daily Pily Ellis MD   bisacodyl 10 mg Rectal Daily PRN Minus MEGAN Carroll   cyanocobalamin 100 mcg Oral Daily Minus MEGAN Carroll enoxaparin 40 mg Subcutaneous Q24H [facility] Kacey Dempsey MD   finasteride 5 mg Oral Daily Chas Rivers PA-C   gabapentin 300 mg Oral TID Burngennaro Rivers PA-C   hydrALAZINE 5 mg Intravenous Q6H PRN Kacey Dempsey MD   melatonin 3 mg Oral HS Kacey Dempsey MD   QUEtiapine 12 5 mg Oral HS Kacey Dempsey MD   senna 1 tablet Oral HS Kristine Edwards PA-C   sodium chloride 2 g Oral Daily Burngennaro Rivers PA-C   tamsulosin 0 4 mg Oral Daily With Dinner Chas Rivers PA-C   traMADol 50 mg Oral BID PRN Kacey Dempsey MD        Today, Patient Was Seen By: Kacey Dempsey MD    ** Please Note: This note has been constructed using a voice recognition system   **

## 2018-11-24 NOTE — PLAN OF CARE
DISCHARGE PLANNING     Discharge to home or other facility with appropriate resources Progressing        DISCHARGE PLANNING - CARE MANAGEMENT     Discharge to post-acute care or home with appropriate resources Progressing        INFECTION - ADULT     Absence or prevention of progression during hospitalization Progressing        Knowledge Deficit     Patient/family/caregiver demonstrates understanding of disease process, treatment plan, medications, and discharge instructions Progressing        PAIN - ADULT     Verbalizes/displays adequate comfort level or baseline comfort level Progressing        Prexisting or High Potential for Compromised Skin Integrity     Skin integrity is maintained or improved Progressing        SAFETY ADULT     Patient will remain free of falls Progressing     Maintain or return to baseline ADL function Progressing     Maintain or return mobility status to optimal level Progressing

## 2018-11-24 NOTE — NURSING NOTE
Pt had unmeasured urine out of his saleh catheter   He pulled the drainage bag off and some spilled on floor

## 2018-11-25 LAB
ATRIAL RATE: 54 BPM
P AXIS: -81 DEGREES
PR INTERVAL: 264 MS
QRS AXIS: 42 DEGREES
QRSD INTERVAL: 78 MS
QT INTERVAL: 442 MS
QTC INTERVAL: 419 MS
T WAVE AXIS: 83 DEGREES
VENTRICULAR RATE: 54 BPM

## 2018-11-25 PROCEDURE — 97116 GAIT TRAINING THERAPY: CPT

## 2018-11-25 PROCEDURE — 97530 THERAPEUTIC ACTIVITIES: CPT

## 2018-11-25 PROCEDURE — 99231 SBSQ HOSP IP/OBS SF/LOW 25: CPT | Performed by: INTERNAL MEDICINE

## 2018-11-25 PROCEDURE — 93010 ELECTROCARDIOGRAM REPORT: CPT | Performed by: INTERNAL MEDICINE

## 2018-11-25 RX ORDER — ALPRAZOLAM 0.25 MG/1
0.25 TABLET ORAL ONCE
Status: COMPLETED | OUTPATIENT
Start: 2018-11-25 | End: 2018-11-25

## 2018-11-25 RX ORDER — AMLODIPINE BESYLATE 10 MG/1
10 TABLET ORAL DAILY
Status: DISCONTINUED | OUTPATIENT
Start: 2018-11-26 | End: 2018-11-28 | Stop reason: HOSPADM

## 2018-11-25 RX ADMIN — GABAPENTIN 300 MG: 300 CAPSULE ORAL at 08:24

## 2018-11-25 RX ADMIN — AMLODIPINE BESYLATE 5 MG: 5 TABLET ORAL at 08:25

## 2018-11-25 RX ADMIN — ALPRAZOLAM 0.25 MG: 0.25 TABLET ORAL at 14:51

## 2018-11-25 RX ADMIN — MELATONIN TAB 3 MG 3 MG: 3 TAB at 20:07

## 2018-11-25 RX ADMIN — SODIUM CHLORIDE TAB 1 GM 2 G: 1 TAB at 08:25

## 2018-11-25 RX ADMIN — QUETIAPINE FUMARATE 25 MG: 25 TABLET ORAL at 20:07

## 2018-11-25 RX ADMIN — TAMSULOSIN HYDROCHLORIDE 0.4 MG: 0.4 CAPSULE ORAL at 15:54

## 2018-11-25 RX ADMIN — ENOXAPARIN SODIUM 40 MG: 40 INJECTION SUBCUTANEOUS at 08:26

## 2018-11-25 RX ADMIN — GABAPENTIN 300 MG: 300 CAPSULE ORAL at 20:07

## 2018-11-25 RX ADMIN — ALPRAZOLAM 0.25 MG: 0.25 TABLET ORAL at 18:21

## 2018-11-25 RX ADMIN — VITAM B12 100 MCG: 100 TAB at 08:32

## 2018-11-25 RX ADMIN — GABAPENTIN 300 MG: 300 CAPSULE ORAL at 15:54

## 2018-11-25 RX ADMIN — FINASTERIDE 5 MG: 5 TABLET, FILM COATED ORAL at 08:26

## 2018-11-25 NOTE — PHYSICAL THERAPY NOTE
PHYSICAL THERAPY NOTE    Patient Name: [facility]  IHPMO'O Date: 2018 1321   Pain Assessment   Pain Assessment No/denies pain   Pain Score No Pain   Restrictions/Precautions   Weight Bearing Precautions Per Order No   Other Precautions Cognitive; Chair Alarm; Bed Alarm; Restraints;Telemetry; Fall Risk   General   Family/Caregiver Present Yes  (spouse)   Subjective   Subjective Patient standing at doorway with spouse and is agreeable to therapy session  Patient identifers obtained from name &   Bed Mobility   Supine to Sit Unable to assess   Sit to Supine Unable to assess   Additional Comments patient seated OOB in recliner post session with PCA and spouse present, call bell and belongings in reach  Transfers   Sit to Stand 4  Minimal assistance   Additional items Assist x 1; Armrests; Increased time required;Verbal cues   Stand to Sit 4  Minimal assistance   Additional items Assist x 1; Armrests; Increased time required;Verbal cues   Ambulation/Elevation   Gait pattern Wide SAE; Decreased foot clearance; Short stride   Gait Assistance 4  Minimal assist   Additional items Assist x 1;Verbal cues   Assistive Device Rolling walker   Distance 160' x1, 15' x1   Balance   Static Sitting Good   Dynamic Sitting Fair   Static Standing Fair -   Dynamic Standing Fair   Ambulatory Poor +   Endurance Deficit   Endurance Deficit Yes   Activity Tolerance   Activity Tolerance Patient tolerated treatment well;Patient limited by fatigue   Nurse Made Aware Spoke to [place], RN    Assessment   Prognosis Fair   Problem List Decreased strength;Decreased endurance; Impaired balance;Decreased mobility; Decreased coordination;Decreased cognition; Impaired judgement;Decreased safety awareness   Assessment Patient demonstrated ability to ambulate increased gait distance with use of roller walker   Presents with flexed posture requiring verbal instruction for improvement and stability with ambulation  Min a to contact guard required for ambulation secondary to walker management as patient with poor navigation with obstacles  Instruction for full tunr and management of walker before descending in chair as well as safe hand palcement  Patient refused participation in seated LE exercise program secondary to reported fatigue  Continue to focus on OOB mobility tasks with reinforcemetn of safety and walker management as appropriate  Goals   Patient Goals none stated   STG Expiration Date 12/06/18   Treatment Day 2   Plan   Treatment/Interventions Functional transfer training;LE strengthening/ROM; Therapeutic exercise; Endurance training;Patient/family training;Equipment eval/education; Bed mobility;Gait training   PT Frequency (3-5x/wk)   Recommendation   Recommendation Post acute IP rehab       Fabiola Ott, PTA

## 2018-11-25 NOTE — PLAN OF CARE
Problem: PHYSICAL THERAPY ADULT  Goal: Performs mobility at highest level of function for planned discharge setting  See evaluation for individualized goals  Treatment/Interventions: Functional transfer training, LE strengthening/ROM, Therapeutic exercise, Endurance training, Patient/family training, Equipment eval/education, Bed mobility, Gait training          See flowsheet documentation for full assessment, interventions and recommendations  Outcome: Progressing  Prognosis: Fair  Problem List: Decreased strength, Decreased endurance, Impaired balance, Decreased mobility, Decreased coordination, Decreased cognition, Impaired judgement, Decreased safety awareness  Assessment: Patient demonstrated ability to ambulate increased gait distance with use of roller walker  Presents with flexed posture requiring verbal instruction for improvement and stability with ambulation  Min a to contact guard required for ambulation secondary to walker management as patient with poor navigation with obstacles  Instruction for full tunr and management of walker before descending in chair as well as safe hand palcement  Patient refused participation in seated LE exercise program secondary to reported fatigue  Continue to focus on OOB mobility tasks with reinforcemetn of safety and walker management as appropriate  Barriers to Discharge: None     Recommendation: Post acute IP rehab     PT - OK to Discharge: Yes (to IP rehabilitation when medically stable)    See flowsheet documentation for full assessment

## 2018-11-25 NOTE — PROGRESS NOTES
Progress Note Khalif Zavala 8/14/1926, 80 y o  male MRN: [id number]    Unit/Bed#: -01 Encounter: [id number]    Primary Care Provider: Josh Dhaliwal DO   Date and time admitted to hospital: 11/20/2018 11:58 PM        * Urinary retention   Assessment & Plan    · Discharged on 11/15 after passing voiding trial without Ruelas, however 11/16 required Ruelas placement in ED with return of acute urinary retention 2/2 BPH  · PCP removed Ruelas today and again pt presents with retention to the ED   · CTAP shows "Large left posterior urinary bladder diverticulum"  Confirmed by urinary bladder ultrasound  · Maintain Ruelas  · Family expressed concern managing catheter at home due to dementia  May benefit from higher level care     Acute kidney injury [facility])   Assessment & Plan    · Cr baseline likely 1 1-1 2  · Likely post-obstructive uropathy 2/2 BPH and urinary retention   · Improved after Ruelas     History of throat cancer   Assessment & Plan    · No issues currently, follows with ENT at [place]    · Pt has attempted to pull out Ruelas in the past  · Supportive care and gentle re-direction, sleep hygiene  · Agitation improved  Started low dose quetiapine               VTE Pharmacologic Prophylaxis:   Pharmacologic: Enoxaparin (Lovenox)  Mechanical VTE Prophylaxis in Place: Yes    Education and Discussions with Family / Patient:     Time Spent for Care: 20 minutes  More than 50% of total time spent on counseling and coordination of care as described above  Current Length of Stay: 4 day(s)    Current Patient Status: Inpatient   Certification Statement: The patient will continue to require additional inpatient hospital stay due to above    Discharge Plan / Estimated Discharge Date: When rehab bed is available    Code Status: Level 3 - DNAR and DNI      Subjective:   Pt seen  No issues overnight   Waiting for rehab placement    Objective:     Vitals:   Temp (24hrs), Av 3 °F (36 8 °C), Min:98 3 °F (36 8 °C), Max:98 4 °F (36 9 °C)    Temp:  [98 3 °F (36 8 °C)-98 4 °F (36 9 °C)] 98 3 °F (36 8 °C)  HR:  [66-68] 68  Resp:  [18] 18  BP: (120-175)/(58-80) 175/70  SpO2:  [94 %-96 %] 95 %  Body mass index is 25 9 kg/m²  Input and Output Summary (last 24 hours): Intake/Output Summary (Last 24 hours) at 18 1421  Last data filed at 18 1220   Gross per 24 hour   Intake              420 ml   Output             1125 ml   Net             -705 ml       Physical Exam:     Physical Exam   Constitutional: No distress  Eyes: Pupils are equal, round, and reactive to light  Conjunctivae and EOM are normal    Neck: Normal range of motion  Neck supple  Cardiovascular: Normal rate  Pulmonary/Chest: Effort normal  No respiratory distress  Abdominal: Soft  Musculoskeletal: He exhibits no edema  Neurological: He is alert  Skin: Skin is warm  He is not diaphoretic  Psychiatric: He has a normal mood and affect             Additional Data:     Labs:      Results from last 7 days  Lab Units 18  0034   WBC Thousand/uL 6 83   HEMOGLOBIN g/dL 13 4   HEMATOCRIT % 40 4   PLATELETS Thousands/uL 187   NEUTROS PCT % 52   LYMPHS PCT % 34   MONOS PCT % 9   EOS PCT % 3       Results from last 7 days  Lab Units 18  0713 18  0034   POTASSIUM mmol/L 4 5 4 7   CHLORIDE mmol/L 107 105   CO2 mmol/L 29 27   BUN mg/dL 20 24   CREATININE mg/dL 1 20 1 42*   CALCIUM mg/dL 8 2* 8 4   ALK PHOS U/L  --  248*   ALT U/L  --  17   AST U/L  --  13       Results from last 7 days  Lab Units 18  0034   INR  1 03       Recent Cultures (last 7 days):           Last 24 Hours Medication List:     Current Facility-Administered Medications:  acetaminophen 650 mg Oral Q6H PRN Lucia Avila PA-C   ALPRAZolam 0 25 mg Oral Daily PRN Nayeli Irby MD   amLODIPine 5 mg Oral Daily Nayeli Irby MD   bisacodyl 10 mg Rectal Daily PRN Lucia Avila PA-C   cyanocobalamin 100 mcg Oral Daily Tash Spain PA-C   enoxaparin 40 mg Subcutaneous Q24H [address] Ron Lucero MD   finasteride 5 mg Oral Daily Tash Spain PA-C   gabapentin 300 mg Oral TID Tash Spain PA-C   hydrALAZINE 5 mg Intravenous Q6H PRN Ron Lucero MD   melatonin 3 mg Oral HS Ron Lucero MD   QUEtiapine 25 mg Oral HS Sang Doan PA-C   senna 1 tablet Oral HS Kristine Edwards PA-C   sodium chloride 2 g Oral Daily Tash Spain PA-C   tamsulosin 0 4 mg Oral Daily With Dinner Tash Spain PA-C   traMADol 50 mg Oral BID PRN Ron Lucero MD        Today, Patient Was Seen By: Ron Lucero MD    ** Please Note: This note has been constructed using a voice recognition system   **

## 2018-11-26 PROCEDURE — 99231 SBSQ HOSP IP/OBS SF/LOW 25: CPT | Performed by: INTERNAL MEDICINE

## 2018-11-26 RX ORDER — ALPRAZOLAM 0.25 MG/1
0.25 TABLET ORAL 3 TIMES DAILY PRN
Status: DISCONTINUED | OUTPATIENT
Start: 2018-11-26 | End: 2018-11-28 | Stop reason: HOSPADM

## 2018-11-26 RX ADMIN — SODIUM CHLORIDE TAB 1 GM 2 G: 1 TAB at 10:27

## 2018-11-26 RX ADMIN — ALPRAZOLAM 0.25 MG: 0.25 TABLET ORAL at 16:05

## 2018-11-26 RX ADMIN — FINASTERIDE 5 MG: 5 TABLET, FILM COATED ORAL at 10:28

## 2018-11-26 RX ADMIN — TAMSULOSIN HYDROCHLORIDE 0.4 MG: 0.4 CAPSULE ORAL at 16:04

## 2018-11-26 RX ADMIN — GABAPENTIN 300 MG: 300 CAPSULE ORAL at 16:04

## 2018-11-26 RX ADMIN — ALPRAZOLAM 0.25 MG: 0.25 TABLET ORAL at 10:29

## 2018-11-26 RX ADMIN — ENOXAPARIN SODIUM 40 MG: 40 INJECTION SUBCUTANEOUS at 10:28

## 2018-11-26 RX ADMIN — SENNOSIDES 8.6 MG: 8.6 TABLET, FILM COATED ORAL at 21:49

## 2018-11-26 RX ADMIN — MELATONIN TAB 3 MG 3 MG: 3 TAB at 21:49

## 2018-11-26 RX ADMIN — QUETIAPINE FUMARATE 25 MG: 25 TABLET ORAL at 21:49

## 2018-11-26 RX ADMIN — GABAPENTIN 300 MG: 300 CAPSULE ORAL at 21:49

## 2018-11-26 RX ADMIN — AMLODIPINE BESYLATE 10 MG: 10 TABLET ORAL at 10:28

## 2018-11-26 RX ADMIN — VITAM B12 100 MCG: 100 TAB at 10:29

## 2018-11-26 RX ADMIN — ALPRAZOLAM 0.25 MG: 0.25 TABLET ORAL at 21:52

## 2018-11-26 RX ADMIN — GABAPENTIN 300 MG: 300 CAPSULE ORAL at 10:40

## 2018-11-26 NOTE — SOCIAL WORK
CM spoke with patient's  at bedside and patient's daughter Price Mulligan via telephone  Family updated that [facility] and [facility] are unable to accept patient at this time  Patient's daughter would like CM to contact [facility] with possible male bed availability  CM will f/u with patient's daughter at bedside

## 2018-11-26 NOTE — UTILIZATION REVIEW
Continued Stay Review    Date: 11/25/2018    Vital Signs: /65 (BP Location: Right arm)   Pulse 56   Temp 98 8 °F (37 1 °C) (Oral)   Resp 18   Wt 94 kg (207 lb 3 7 oz)   SpO2 95%   BMI 25 90 kg/m²     Medications:   Scheduled Meds:   Current Facility-Administered Medications:  amLODIPine 10 mg Oral Daily   cyanocobalamin 100 mcg Oral Daily   enoxaparin 40 mg Subcutaneous Q24H BRIANDA   finasteride 5 mg Oral Daily   gabapentin 300 mg Oral TID   melatonin 3 mg Oral HS   QUEtiapine 25 mg Oral HS   senna 1 tablet Oral HS   sodium chloride 2 g Oral Daily   tamsulosin 0 4 mg Oral Daily With Dinner     PRN Meds:   acetaminophen    ALPRAZolam    bisacodyl    hydrALAZINE    traMADol    Age/Sex: 80 y o  male     Assessment/Plan:     * Urinary retention   Assessment & Plan     · Discharged on 11/15 after passing voiding trial without Ruelas, however 11/16 required Ruelas placement in ED with return of acute urinary retention 2/2 BPH  · PCP removed Ruelas today and again pt presents with retention to the ED   ? CTAP shows "Large left posterior urinary bladder diverticulum"  Confirmed by urinary bladder ultrasound  · Maintain Ruelas  · Family expressed concern managing catheter at home due to dementia  May benefit from higher level care      Acute kidney injury [facility])   Assessment & Plan     · Cr baseline likely 1 1-1 2  · Likely post-obstructive uropathy 2/2 BPH and urinary retention   · Improved after Ruelas      History of throat cancer   Assessment & Plan     · No issues currently, follows with ENT at [facility]     · Pt has attempted to pull out Ruelas in the past  · Supportive care and gentle re-direction, sleep hygiene  · Agitation improved  Started low dose quetiapine                   VTE Pharmacologic Prophylaxis:   Pharmacologic: Enoxaparin (Lovenox)  Mechanical VTE Prophylaxis in Place:  Yes      Current Length of Stay: 4 day(s)     Current Patient Status: Inpatient Certification Statement: The patient will continue to require additional inpatient hospital stay due to above     Discharge Plan / Estimated Discharge Date: When rehab bed is available

## 2018-11-26 NOTE — SOCIAL WORK
CM spoke with patient's daughter Redd Jackson  CM updated [place] that [facility] would be an oop cost due to insurance not in-network  Redd Jackson spoke to [facility] and the cost would be $5,000 oop for 2 weeks  [facility] may have a male bed available tomorrow  Patient's daughter would like to contact [facility]  CM updated [facility] via allscripts that daughter will be contacting facility  CM spoke with patient's daughter Redd Jackson  Daughter spoke with [facility]  Facility is able to accept patient tomorrow pending insurance auth and bed availability  CM updated by facility  CM will f/u with daughter and submit insurance auth

## 2018-11-26 NOTE — SOCIAL WORK
ESTHER called patient's insurance company  Auth pending # [id number]   CM faxed clinicals to [phone]

## 2018-11-26 NOTE — ASSESSMENT & PLAN NOTE
· Discharged on 11/15 after passing voiding trial without Ruelas, however 11/16 required Ruelas placement in ED with return of acute urinary retention 2/2 BPH  · PCP removed Ruelas today and again pt presents with retention to the ED   · CTAP shows "Large left posterior urinary bladder diverticulum"  Confirmed by urinary bladder ultrasound  · Maintain Ruelas  · Outpt F/U with urology  · Family expressed concern managing catheter at home due to dementia   Will benefit from higher level care

## 2018-11-26 NOTE — ASSESSMENT & PLAN NOTE
· Pt has attempted to pull out Ruelas in the past  · Supportive care and gentle re-direction, sleep hygiene  · Agitation improved   Started quetiapine that is helping per family  · Xanax prn

## 2018-11-26 NOTE — PROGRESS NOTES
Progress Note Brooks Johnson 8/14/1926, 80 y o  male MRN: [id number]    Unit/Bed#: -01 Encounter: [id number]    Primary Care Provider: Alejandrina Ruiz DO   Date and time admitted to hospital: 11/20/2018 11:58 PM        * Urinary retention   Assessment & Plan    · Discharged on 11/15 after passing voiding trial without Ruelas, however 11/16 required Ruelas placement in ED with return of acute urinary retention 2/2 BPH  · PCP removed Ruelas today and again pt presents with retention to the ED   · CTAP shows "Large left posterior urinary bladder diverticulum"  Confirmed by urinary bladder ultrasound  · Maintain Ruelas  · Outpt F/U with urology  · Family expressed concern managing catheter at home due to dementia  Will benefit from higher level care     Acute kidney injury [facility])   Assessment & Plan    · Cr baseline likely 1 1-1 2  · Likely post-obstructive uropathy 2/2 BPH and urinary retention   · Improved after Ruelas     History of throat cancer   Assessment & Plan    · No issues currently, follows with ENT at [place]    · Pt has attempted to pull out Ruelas in the past  · Supportive care and gentle re-direction, sleep hygiene  · Agitation improved  Started quetiapine that is helping per family  · Xanax prn                 VTE Pharmacologic Prophylaxis:   Pharmacologic: Enoxaparin (Lovenox)  Mechanical VTE Prophylaxis in Place: Yes    Education and Discussions with Family / Patient: Spoke with Pt's   at bedside    Time Spent for Care: 20 minutes  More than 50% of total time spent on counseling and coordination of care as described above      Current Length of Stay: 5 day(s)    Current Patient Status: Inpatient   Certification Statement: The patient will continue to require additional inpatient hospital stay due to Waiting for rehab waiting for rehab    Discharge Plan / Estimated Discharge Date:  Medically cleared to go to rehab when bed is available    Code Status: Level 3 - DNAR and DNI      Subjective:   Patient is anxious at times  Overall stable    Objective:     Vitals:   Temp (24hrs), Av 2 °F (36 8 °C), Min:97 5 °F (36 4 °C), Max:98 8 °F (37 1 °C)    Temp:  [97 5 °F (36 4 °C)-98 8 °F (37 1 °C)] 98 8 °F (37 1 °C)  HR:  [56-64] 56  Resp:  [16-18] 18  BP: (133-150)/(62-65) 147/65  SpO2:  [95 %-96 %] 95 %  Body mass index is 25 9 kg/m²  Input and Output Summary (last 24 hours): Intake/Output Summary (Last 24 hours) at 18 1203  Last data filed at 18 0851   Gross per 24 hour   Intake              480 ml   Output              800 ml   Net             -320 ml       Physical Exam:     Physical Exam   Constitutional: No distress  Eyes: Pupils are equal, round, and reactive to light  Conjunctivae are normal    Neck: Normal range of motion  Neck supple  Cardiovascular: Normal rate  Pulmonary/Chest: Effort normal and breath sounds normal  No respiratory distress  Abdominal: Soft  Bowel sounds are normal  He exhibits no distension  Musculoskeletal: He exhibits no edema  Neurological: He is alert  Skin: Skin is warm and dry  He is not diaphoretic  Psychiatric: He has a normal mood and affect             Additional Data:     Labs:      Results from last 7 days  Lab Units 18  0034   WBC Thousand/uL 6 83   HEMOGLOBIN g/dL 13 4   HEMATOCRIT % 40 4   PLATELETS Thousands/uL 187   NEUTROS PCT % 52   LYMPHS PCT % 34   MONOS PCT % 9   EOS PCT % 3       Results from last 7 days  Lab Units 18  0713 18  0034   POTASSIUM mmol/L 4 5 4 7   CHLORIDE mmol/L 107 105   CO2 mmol/L 29 27   BUN mg/dL 20 24   CREATININE mg/dL 1 20 1 42*   CALCIUM mg/dL 8 2* 8 4   ALK PHOS U/L  --  248*   ALT U/L  --  17   AST U/L  --  13       Results from last 7 days  Lab Units 18  0034   INR  1 03         Recent Cultures (last 7 days):           Last 24 Hours Medication List:     Current Facility-Administered Medications:  acetaminophen 650 mg Oral Q6H PRN Chas Rivers PA-C   ALPRAZolam 0 25 mg Oral Daily PRN Kacey Dempsey MD   amLODIPine 10 mg Oral Daily Kacey Dempsey MD   bisacodyl 10 mg Rectal Daily PRN Chas Rivers PA-C   cyanocobalamin 100 mcg Oral Daily Chas Rivers PA-C   enoxaparin 40 mg Subcutaneous Q24H [address] Kacey Dempsey MD   finasteride 5 mg Oral Daily Kristine Chapman PA-C   gabapentin 300 mg Oral TID Chas Rivers PA-C   hydrALAZINE 5 mg Intravenous Q6H PRN Kacey Dempsey MD   melatonin 3 mg Oral HS Kacey Dempsey MD   QUEtiapine 25 mg Oral HS Sang Gaytan PA-C   senna 1 tablet Oral HS Kristine Edwards PA-C   sodium chloride 2 g Oral Daily Chas Rivers PA-C   tamsulosin 0 4 mg Oral Daily With Dinner Chas Rivers PA-C   traMADol 50 mg Oral BID PRN Kacey Dempsey MD        Today, Patient Was Seen By: Kacey Dempsey MD    ** Please Note: This note has been constructed using a voice recognition system   **

## 2018-11-27 PROBLEM — N17.9 ACUTE KIDNEY INJURY (HCC): Status: RESOLVED | Noted: 2018-11-14 | Resolved: 2018-11-27

## 2018-11-27 PROCEDURE — 99232 SBSQ HOSP IP/OBS MODERATE 35: CPT | Performed by: INTERNAL MEDICINE

## 2018-11-27 RX ORDER — AMLODIPINE BESYLATE 10 MG/1
10 TABLET ORAL DAILY
Qty: 30 TABLET | Refills: 0 | Status: SHIPPED | OUTPATIENT
Start: 2018-11-28

## 2018-11-27 RX ORDER — HYDROCODONE BITARTRATE AND ACETAMINOPHEN 5; 300 MG/1; MG/1
1 TABLET ORAL EVERY 6 HOURS PRN
Qty: 10 TABLET | Refills: 0 | Status: ON HOLD | OUTPATIENT
Start: 2018-11-27 | End: 2018-12-07

## 2018-11-27 RX ORDER — QUETIAPINE FUMARATE 25 MG/1
25 TABLET, FILM COATED ORAL
Qty: 30 TABLET | Refills: 0 | Status: SHIPPED | OUTPATIENT
Start: 2018-11-27

## 2018-11-27 RX ORDER — LANOLIN ALCOHOL/MO/W.PET/CERES
3 CREAM (GRAM) TOPICAL
Qty: 30 TABLET | Refills: 0 | Status: SHIPPED | OUTPATIENT
Start: 2018-11-27

## 2018-11-27 RX ORDER — ACETAMINOPHEN 325 MG/1
650 TABLET ORAL EVERY 6 HOURS PRN
Qty: 30 TABLET | Refills: 0 | Status: SHIPPED | OUTPATIENT
Start: 2018-11-27

## 2018-11-27 RX ORDER — ALPRAZOLAM 0.25 MG/1
0.25 TABLET ORAL 3 TIMES DAILY PRN
Qty: 10 TABLET | Refills: 0 | Status: ON HOLD | OUTPATIENT
Start: 2018-11-27 | End: 2018-12-07

## 2018-11-27 RX ADMIN — QUETIAPINE FUMARATE 25 MG: 25 TABLET ORAL at 20:05

## 2018-11-27 RX ADMIN — GABAPENTIN 300 MG: 300 CAPSULE ORAL at 15:42

## 2018-11-27 RX ADMIN — ALPRAZOLAM 0.25 MG: 0.25 TABLET ORAL at 15:42

## 2018-11-27 RX ADMIN — SENNOSIDES 8.6 MG: 8.6 TABLET, FILM COATED ORAL at 20:05

## 2018-11-27 RX ADMIN — VITAM B12 100 MCG: 100 TAB at 10:14

## 2018-11-27 RX ADMIN — AMLODIPINE BESYLATE 10 MG: 10 TABLET ORAL at 09:59

## 2018-11-27 RX ADMIN — MELATONIN TAB 3 MG 3 MG: 3 TAB at 20:05

## 2018-11-27 RX ADMIN — SODIUM CHLORIDE TAB 1 GM 2 G: 1 TAB at 09:59

## 2018-11-27 RX ADMIN — GABAPENTIN 300 MG: 300 CAPSULE ORAL at 20:05

## 2018-11-27 RX ADMIN — TRAMADOL HYDROCHLORIDE 50 MG: 50 TABLET, COATED ORAL at 10:12

## 2018-11-27 RX ADMIN — GABAPENTIN 300 MG: 300 CAPSULE ORAL at 10:00

## 2018-11-27 RX ADMIN — FINASTERIDE 5 MG: 5 TABLET, FILM COATED ORAL at 09:58

## 2018-11-27 RX ADMIN — ALPRAZOLAM 0.25 MG: 0.25 TABLET ORAL at 10:13

## 2018-11-27 RX ADMIN — ENOXAPARIN SODIUM 40 MG: 40 INJECTION SUBCUTANEOUS at 10:00

## 2018-11-27 RX ADMIN — TAMSULOSIN HYDROCHLORIDE 0.4 MG: 0.4 CAPSULE ORAL at 15:42

## 2018-11-27 NOTE — SOCIAL WORK
CM left a voice message with nurse reviewing patient's clinicals for SNF level rehab  Insurance auth still pending, [employer] pending [id number]  Waiting for a callback  ESTHER updated Mayra from [facility] that insurance [employer]bal [employer]o is still pending  [facility] will hold bed for patient

## 2018-11-27 NOTE — ASSESSMENT & PLAN NOTE
· Discharged on 11/15 after passing voiding trial without Ruelas, however 11/16 required Ruelas placement in ED with return of acute urinary retention 2/2 BPH  · PCP removed Ruelas today and again pt presents with retention to the ED   · CTAP shows "Large left posterior urinary bladder diverticulum"   Confirmed by urinary bladder ultrasound  · Maintain Ruelas  · Outpt F/U with urology  ·

## 2018-11-27 NOTE — SOCIAL WORK
CM spoke with patient's insurance company  Prior authorizations is not required for a non-emergent BLS transport  Insurance coverage is not a guarantee  CM spoke with patient's family at bedside  Patient's family updated that BLS transportation is not a guaranteed coverage  Patient's family is agreeable to transporting patient at time of discharge  Patient's daughter spoke with the insurance company  Per daughter, physician would need to contact insurance company to expedite the auth process  CM spoke with patient's insurance company  Insurance company updated with plan of care  Waiting for authorization

## 2018-11-27 NOTE — ASSESSMENT & PLAN NOTE
· Continue with Supportive care and gentle re-direction and reorientation, maintained good sleep hygiene  · Agitation improved  Continue with Seroquel 25 mg at bedtime to be gradually titrated as needed  ·   Xanax prn agitation    · Recommend outpatient geriatrician evaluation upon discharge

## 2018-11-27 NOTE — SOCIAL WORK
CM spoke with patient's family at bedside  Patient family updated that [facility] will have a room available today after 1600  CM is waiting for insurance authorization  Patient's daughter is going to call the insurance company to expedite the process  CM will contact insurance company re:coverage of [facility] transportation

## 2018-11-27 NOTE — PROGRESS NOTES
Progress Note Cy Conway 8/14/1926, 80 y o  male MRN: [id number]    Unit/Bed#: -01 Encounter: [id number]    Primary Care Provider: Jef Goodman DO   Date and time admitted to hospital: 11/20/2018 11:58 PM        * Urinary retention   Assessment & Plan    · Discharged on 11/15 after passing voiding trial without Ruelas, however 11/16 required Ruelas placement in ED with return of acute urinary retention 2/2 BPH  · PCP removed Ruelas today and again pt presents with retention to the ED   · CTAP shows "Large left posterior urinary bladder diverticulum"  Confirmed by urinary bladder ultrasound  · Maintain Ruelas  · Outpt F/U with urology  ·      Microscopic hematuria   Assessment & Plan    · See primary plan      History of throat cancer   Assessment & Plan    · No issues currently, follows with ENT at [facility]     Acute kidney injury [facility])   Assessment & Plan    · Cr baseline likely 1 1-1 2  · Likely post-obstructive uropathy 2/2 BPH and urinary retention   · Improved after Ruelas     Dementia   Assessment & Plan    · Continue with Supportive care and gentle re-direction and reorientation, maintained good sleep hygiene  · Agitation improved  Continue with Seroquel 25 mg at bedtime to be gradually titrated as needed  ·   Xanax prn agitation  · Recommend outpatient geriatrician evaluation upon discharge            VTE Pharmacologic Prophylaxis:   Pharmacologic: Enoxaparin (Lovenox)  Mechanical VTE Prophylaxis in Place: No    Patient Centered Rounds: I have performed bedside rounds with nursing staff today  Discussions with Specialists or Other Care Team Provider:  Discussed with     Education and Discussions with Family / Patient:  Discussed with wife and daughter at length at bedside    Time Spent for Care: 30 minutes  More than 50% of total time spent on counseling and coordination of care as described above      Current Length of Stay: 6 day(s)    Current Patient Status: Inpatient   Certification Statement: The patient will continue to require additional inpatient hospital stay due to Await rehabilitation    Discharge Plan:  Discharge to rehabilitation once bed available    Code Status: Level 3 - DNAR and DNI      Subjective:   Patient denies any complaints  No further episode of agitation overnight  Objective:     Vitals:   Temp (24hrs), Av 7 °F (37 1 °C), Min:98 2 °F (36 8 °C), Max:99 2 °F (37 3 °C)    Temp:  [98 2 °F (36 8 °C)-99 2 °F (37 3 °C)] 98 2 °F (36 8 °C)  HR:  [53-67] 53  Resp:  [18] 18  BP: (156-166)/(70-73) 156/70  SpO2:  [94 %-96 %] 94 %  Body mass index is 25 9 kg/m²  Input and Output Summary (last 24 hours): Intake/Output Summary (Last 24 hours) at 18 1440  Last data filed at 18 1426   Gross per 24 hour   Intake                0 ml   Output              875 ml   Net             -875 ml       Physical Exam:     Physical Exam   HENT:   Head: Normocephalic and atraumatic  Mouth/Throat: Oropharynx is clear and moist    Eyes: Pupils are equal, round, and reactive to light  Conjunctivae are normal    Neck: Normal range of motion  Neck supple  Cardiovascular: Normal rate and regular rhythm  Pulmonary/Chest: Effort normal and breath sounds normal    Abdominal: Soft  Bowel sounds are normal    Genitourinary:   Genitourinary Comments: Indwelling Ruelas catheter in place   Musculoskeletal: He exhibits no edema  Neurological: He is alert  Coordination normal    Oriented to self  Currently, cooperative   Skin: Skin is warm  He is not diaphoretic     Additional Data:     Labs:      Results from last 7 days  Lab Units 18  0034   WBC Thousand/uL 6 83   HEMOGLOBIN g/dL 13 4   HEMATOCRIT % 40 4   PLATELETS Thousands/uL 187   NEUTROS PCT % 52   LYMPHS PCT % 34   MONOS PCT % 9   EOS PCT % 3       Results from last 7 days  Lab Units 18  0713 18  0034   SODIUM mmol/L 141 139   POTASSIUM mmol/L 4 5 4 7   CHLORIDE mmol/L 107 105   CO2 mmol/L 29 27   BUN mg/dL 20 24   CREATININE mg/dL 1 20 1 42*   ANION GAP mmol/L 5 7   CALCIUM mg/dL 8 2* 8 4   ALBUMIN g/dL  --  3 0*   TOTAL BILIRUBIN mg/dL  --  0 60   ALK PHOS U/L  --  248*   ALT U/L  --  17   AST U/L  --  13   GLUCOSE RANDOM mg/dL 94 97       Results from last 7 days  Lab Units 11/21/18  0034   INR  1 03                       * I Have Reviewed All Lab Data Listed Above  * Additional Pertinent Lab Tests Reviewed: Peter 66 Admission Reviewed    Imaging:    Imaging Reports Reviewed Today Include: NA  I        Last 24 Hours Medication List:     Current Facility-Administered Medications:  acetaminophen 650 mg Oral Q6H PRN Jesse Fulling, MEGAN   ALPRAZolam 0 25 mg Oral TID PRN Rosio Gregory MD   amLODIPine 10 mg Oral Daily Rosio Gregory MD   bisacodyl 10 mg Rectal Daily PRN Jesse Fulling, MEGAN   cyanocobalamin 100 mcg Oral Daily Jesse Fulling, MEGAN   enoxaparin 40 mg Subcutaneous Q24H [address] Rosio Gregory MD   finasteride 5 mg Oral Daily Kristine Syed PA-C   gabapentin 300 mg Oral TID Jesse Fulling, MEGAN   hydrALAZINE 5 mg Intravenous Q6H PRN Rosio Gregory MD   melatonin 3 mg Oral HS Rosio Gregory MD   QUEtiapine 25 mg Oral HS Sang Bullard PA-C   senna 1 tablet Oral HS JESSICA Stacy-C   sodium chloride 2 g Oral Daily Jesse Fulling, MEGAN   tamsulosin 0 4 mg Oral Daily With [employer], MEGAN   traMADol 50 mg Oral BID PRN Rosio Gregory MD        Today, Patient Was Seen By: Filipe Lamar MD    ** Please Note: Dictation voice to text software may have been used in the creation of this document   **

## 2018-11-27 NOTE — SOCIAL WORK
CM updated patient's family at the bedside  [facility] will not take patient with insurance auth pending  CM left a voice message for Mayra from [facility]  Waiting for a callback

## 2018-11-27 NOTE — SOCIAL WORK
Patient's insurance company is reviewing clinicals   Clinical nurse can be called at [phone] for updates

## 2018-11-28 VITALS
OXYGEN SATURATION: 95 % | DIASTOLIC BLOOD PRESSURE: 76 MMHG | HEART RATE: 55 BPM | TEMPERATURE: 98.3 F | BODY MASS INDEX: 25.9 KG/M2 | RESPIRATION RATE: 18 BRPM | WEIGHT: 207.23 LBS | SYSTOLIC BLOOD PRESSURE: 170 MMHG

## 2018-11-28 PROCEDURE — 99239 HOSP IP/OBS DSCHRG MGMT >30: CPT | Performed by: INTERNAL MEDICINE

## 2018-11-28 RX ADMIN — AMLODIPINE BESYLATE 10 MG: 10 TABLET ORAL at 09:24

## 2018-11-28 RX ADMIN — SODIUM CHLORIDE TAB 1 GM 2 G: 1 TAB at 09:24

## 2018-11-28 RX ADMIN — FINASTERIDE 5 MG: 5 TABLET, FILM COATED ORAL at 09:24

## 2018-11-28 RX ADMIN — VITAM B12 100 MCG: 100 TAB at 09:24

## 2018-11-28 RX ADMIN — ALPRAZOLAM 0.25 MG: 0.25 TABLET ORAL at 11:13

## 2018-11-28 RX ADMIN — GABAPENTIN 300 MG: 300 CAPSULE ORAL at 09:24

## 2018-11-28 RX ADMIN — ENOXAPARIN SODIUM 40 MG: 40 INJECTION SUBCUTANEOUS at 09:24

## 2018-11-28 NOTE — PROGRESS NOTES
Pt asleep in bed  No visible signs of distress noted at this time  Bed low and locked; side rails up; call bell within reach; bed alarm on  Will continue to monitor

## 2018-11-28 NOTE — UTILIZATION REVIEW
Continued Stay Review    Date: 11/28/2018    Vital Signs: /76 (BP Location: Right arm)   Pulse 55   Temp 98 3 °F (36 8 °C) (Oral)   Resp 18   Wt 94 kg (207 lb 3 7 oz)   SpO2 95%   BMI 25 90 kg/m²     Medications:   Scheduled Meds:   Current Facility-Administered Medications:  acetaminophen 650 mg Oral Q6H PRN   ALPRAZolam 0 25 mg Oral TID PRN   amLODIPine 10 mg Oral Daily   bisacodyl 10 mg Rectal Daily PRN   cyanocobalamin 100 mcg Oral Daily   enoxaparin 40 mg Subcutaneous Q24H BRIANDA   finasteride 5 mg Oral Daily   gabapentin 300 mg Oral TID   glycerin-hypromellose- 1 drop Both Eyes Q3H PRN   hydrALAZINE 5 mg Intravenous Q6H PRN   melatonin 3 mg Oral HS   QUEtiapine 25 mg Oral HS   senna 1 tablet Oral HS   sodium chloride 2 g Oral Daily   tamsulosin 0 4 mg Oral Daily With Dinner   traMADol 50 mg Oral BID PRN     Continuous Infusions:    PRN Meds: ALPRAZolam - used x 1  Abnormal Labs/Diagnostic Results: none today    Age/Sex: 80 y o  male     Assessment/Plan: probable dc today    Urinary retention   Assessment & Plan     · Discharged on 11/15 after passing voiding trial without Ruelas, however 11/16 required Ruelas placement in ED with return of acute urinary retention 2/2 BPH  · PCP removed Ruelas today and again pt presents with retention to the ED   ? CTAP shows "Large left posterior urinary bladder diverticulum"  Confirmed by urinary bladder ultrasound  · Maintain Ruelas  · Outpt F/U with urology  ·        Microscopic hematuria   Assessment & Plan     · See primary plan       History of throat cancer   Assessment & Plan     · No issues currently, follows with ENT at [address] [place] [address] · Continue with Supportive care and gentle re-direction and reorientation, maintained good sleep hygiene  · Agitation improved  Continue with Seroquel 25 mg at bedtime to be gradually titrated as needed  ·   Xanax prn agitation    · Recommend outpatient geriatrician evaluation upon discharge             Discharge Plan:  Rehab at [address] [place] [address] Utilization Review Department  Phone: [phone]; Fax [phone]  [email or web address]  ATTENTION: Please call with any questions or concerns to [phone]  and carefully listen to the prompts so that you are directed to the right person  Send all requests for admission clinical reviews, approved or denied determinations and any other requests to fax [phone]   All voicemails are confidential

## 2018-11-28 NOTE — PLAN OF CARE
Problem: Prexisting or High Potential for Compromised Skin Integrity  Goal: Skin integrity is maintained or improved  INTERVENTIONS:  - Identify patients at risk for skin breakdown  - Assess and monitor skin integrity  - Assess and monitor nutrition and hydration status  - Monitor labs (i e  albumin)  - Assess for incontinence   - Turn and reposition patient  - Assist with mobility/ambulation  - Relieve pressure over bony prominences  - Avoid friction and shearing  - Provide appropriate hygiene as needed including keeping skin clean and dry  - Evaluate need for skin moisturizer/barrier cream  - Collaborate with interdisciplinary team (i e  Nutrition, Rehabilitation, etc )   - Patient/family teaching   Outcome: Completed Date Met: 11/28/18      Problem: PAIN - ADULT  Goal: Verbalizes/displays adequate comfort level or baseline comfort level  Interventions:  - Encourage patient to monitor pain and request assistance  - Assess pain using appropriate pain scale  - Administer analgesics based on type and severity of pain and evaluate response  - Implement non-pharmacological measures as appropriate and evaluate response  - Consider cultural and social influences on pain and pain management  - Notify physician/advanced practitioner if interventions unsuccessful or patient reports new pain   Outcome: Completed Date Met: 11/28/18      Problem: INFECTION - ADULT  Goal: Absence or prevention of progression during hospitalization  INTERVENTIONS:  - Assess and monitor for signs and symptoms of infection  - Monitor lab/diagnostic results  - Monitor all insertion sites, i e  indwelling lines, tubes, and drains  - Monitor endotracheal (as able) and nasal secretions for changes in amount and color  - [place] appropriate cooling/warming therapies per order  - Administer medications as ordered  - Instruct and encourage patient and family to use good hand hygiene technique  - Identify and instruct in appropriate isolation precautions for identified infection/condition   Outcome: Completed Date Met: 11/28/18      Problem: SAFETY ADULT  Goal: Patient will remain free of falls  INTERVENTIONS:  - Assess patient frequently for physical needs  -  Identify cognitive and physical deficits and behaviors that affect risk of falls    -  [place] fall precautions as indicated by assessment   - Educate patient/family on patient safety including physical limitations  - Instruct patient to call for assistance with activity based on assessment  - Modify environment to reduce risk of injury  - Consider OT/PT consult to assist with strengthening/mobility   Outcome: Completed Date Met: 11/28/18    Goal: Maintain or return to baseline ADL function  INTERVENTIONS:  -  Assess patient's ability to carry out ADLs; assess patient's baseline for ADL function and identify physical deficits which impact ability to perform ADLs (bathing, care of mouth/teeth, toileting, grooming, dressing, etc )  - Assess/evaluate cause of self-care deficits   - Assess range of motion  - Assess patient's mobility; develop plan if impaired  - Assess patient's need for assistive devices and provide as appropriate  - Encourage maximum independence but intervene and supervise when necessary  ¯ Involve family in performance of ADLs  ¯ Assess for home care needs following discharge   ¯ Request OT consult to assist with ADL evaluation and planning for discharge  ¯ Provide patient education as appropriate   Outcome: Completed Date Met: 11/28/18    Goal: Maintain or return mobility status to optimal level  INTERVENTIONS:  - Assess patient's baseline mobility status (ambulation, transfers, stairs, etc )    - Identify cognitive and physical deficits and behaviors that affect mobility  - Identify mobility aids required to assist with transfers and/or ambulation (gait belt, sit-to-stand, lift, walker, cane, etc )  - [place] fall precautions as indicated by assessment  - Record patient progress and toleration of activity level on Mobility SBAR; progress patient to next Phase/Stage  - Instruct patient to call for assistance with activity based on assessment  - Request Rehabilitation consult to assist with strengthening/weightbearing, etc    Outcome: Completed Date Met: 11/28/18      Problem: DISCHARGE PLANNING  Goal: Discharge to home or other facility with appropriate resources  INTERVENTIONS:  - Identify barriers to discharge w/patient and caregiver  - Arrange for needed discharge resources and transportation as appropriate  - Identify discharge learning needs (meds, wound care, etc )  - Arrange for interpretive services to assist at discharge as needed  - Refer to Case Management Department for coordinating discharge planning if the patient needs post-hospital services based on physician/advanced practitioner order or complex needs related to functional status, cognitive ability, or social support system   Outcome: Completed Date Met: 11/28/18      Problem: Knowledge Deficit  Goal: Patient/family/caregiver demonstrates understanding of disease process, treatment plan, medications, and discharge instructions  Complete learning assessment and assess knowledge base    Interventions:  - Provide teaching at level of understanding  - Provide teaching via preferred learning methods   Outcome: Completed Date Met: 11/28/18      Problem: DISCHARGE PLANNING - CARE MANAGEMENT  Goal: Discharge to post-acute care or home with appropriate resources  INTERVENTIONS:  - Conduct assessment to determine patient/family and health care team treatment goals, and need for post-acute services based on payer coverage, community resources, and patient preferences, and barriers to discharge  - Address psychosocial, clinical, and financial barriers to discharge as identified in assessment in conjunction with the patient/family and health care team  - Arrange appropriate level of post-acute services according to patients needs and preference and payer coverage in collaboration with the physician and health care team  - Communicate with and update the patient/family, physician, and health care team regarding progress on the discharge plan  - Arrange appropriate transportation to post-acute venues   Outcome: Completed Date Met: 11/28/18

## 2018-11-28 NOTE — DISCHARGE SUMMARY
Discharge- [facility] 8/14/1926, 80 y o  male MRN: [id number]    Unit/Bed#: -01 Encounter: [id number]    Primary Care Provider: Paris Roberts DO   Date and time admitted to hospital: 11/20/2018 11:58 PM        * Urinary retention   Assessment & Plan    · Discharged on 11/15 after passing voiding trial without Ruelas, however 11/16 required Ruelas placement in ED with return of acute urinary retention 2/2 BPH  · PCP removed Ruelas today and again pt presents with retention to the ED   · CTAP shows "Large left posterior urinary bladder diverticulum"  Confirmed by urinary bladder ultrasound  · Maintain Ruelas  · Outpt F/U with urology  ·      Microscopic hematuria   Assessment & Plan    · See primary plan      History of throat cancer   Assessment & Plan    · No issues currently, follows with ENT at [place]    · Continue with Supportive care and gentle re-direction and reorientation, maintained good sleep hygiene  · Agitation improved  Continue with Seroquel 25 mg at bedtime to be gradually titrated as needed  ·   Xanax prn agitation  · Recommend outpatient geriatrician evaluation upon discharge              Discharging Physician / Practitioner: Ramón Lal MD  PCP: Paris Roberts DO  Admission Date:   Admission Orders     Ordered        11/21/18 1422  Inpatient Admission  Once         11/21/18 0306  Place in Observation (expected length of stay for this patient is less than two midnights)  Once             Discharge Date: 11/28/18    Disposition:      Short Term Rehab or SNF at [place] [address] (see below)    For Discharges to [facility] SNF:   · [facility] Texted SNF Physician    Reason for Admission: Urinary Retention    Discharge Diagnoses:     Please see assessment and plan section above for further details regarding discharge diagnoses       Resolved Problems  Date Reviewed: 11/21/2018          Resolved    Acute kidney injury ([facility] [address] ) 11/27/2018     Resolved by  Margeret Bence, MD          Consultations During Hospital Stay:  ·   Urology  Procedures Performed:     · Ruelas Catheterization    Medication Adjustments and Discharge Medications:  · Summary of Medication Adjustments made as a result of this hospitalization: Started on Seroquel/Needs titration as needed  Wean off Xanax as tolerated  · Medication Dosing Tapers - Please refer to Discharge Medication List for details on any medication dosing tapers (if applicable to patient)  · Medications being temporarily held (include recommended restart time): None   · Discharge Medication List: See after visit summary for reconciled discharge medications  Wound Care Recommendations:  When applicable, please see wound care section of After Visit Summary  Diet Recommendations at Discharge:  Diet -        Diet Orders            Start     Ordered    11/21/18 0706  Room Service  Once     Question:  Type of Service  Answer:  Room Service - Appropriate with Assistance    11/21/18 0705    11/21/18 0501  Diet Regular; Regular House  Diet effective now     Question Answer Comment   Diet Type Regular    Regular Regular House    RD to adjust diet per protocol? Yes        11/21/18 0502          Instructions for any Catheters / Lines Present at Discharge (including removal date, if applicable): Ruelas Catheter care per protocol    Significant Findings / Test Results:     US bladder   Final Result by Estela Gilmore MD (11/21 1525)      Ultrasound confirms a large left posterior bladder diverticulum               Workstation performed: [id number]         XR chest 1 view portable   ED Interpretation by Abhi Cheung MD (11/21 0150)   No acute disease read by me  Final Result by Quentin Lr MD (27/47 1410)      Questionable nodule in the right midlung  This is the not described on the initial ED interpretation  CT of the chest recommended        The study was marked in EPIC for immediate notification  Workstation performed: [id number]         CT renal protocol   ED Interpretation by Manoj Presley MD (11/21 0300)   FINDINGS:      ABDOMEN      RIGHT KIDNEY AND URETER:   No solid renal mass   No detectable urothelial mass  No hydronephrosis or hydroureter  No urinary tract calculi  No perinephric collection  LEFT KIDNEY AND URETER:   No solid renal mass   No detectable urothelial mass  No hydronephrosis or hydroureter  No urinary tract calculi  No perinephric collection  URINARY BLADDER:   Partially collapsed around a Ruelas catheter balloon   Tiny droplets of air in the urinary bladder   Large left posterior urinary bladder diverticulum versus peritoneal cyst/mass measuring 4 1 x 3 9 cm   Thickening of the urinary bladder wall is seen  No calculi  LOWER CHEST:  No clinically significant abnormality identified in the visualized lower chest       LIVER/BILIARY TREE:  Unremarkable  GALLBLADDER:  There are gallstone(s) within the gallbladder, without pericholecystic inflammatory changes  SPLEEN:  Unremarkable  PANCREAS:  Fatty infiltration of the pancreas  ADRENAL GLANDS:  Unremarkable  STOMACH AND BOWEL:  Unremarkable  ABDOMINOPELVIC CAVITY:  No ascites   No free intraperitoneal air  No lymphadenopathy  VESSELS:  Atherosclerotic changes of the aorta and its branches  PELVIS      REPRODUCTIVE ORGANS:  Extremely large prostate      APPENDIX: No findings to suggest appendicitis  ABDOMINAL WALL/INGUINAL REGIONS: Naty Marine is a small fat-containing umbilical hernia   Large left inguinal hernia containing nonobstructed loops of bowel  OSSEOUS STRUCTURES:  Heterogeneous appearance of the sacrum and iliac bones of unclear etiology   Degenerative changes of the spine are seen     Impression:        Diffusely thickened urinary bladder wall which is collapsed around a Ruelas catheter balloon   Correlate for cystitis  Large left posterior urinary bladder diverticulum versus peritoneal cyst/mass   Recommend nonemergent urinary bladder ultrasound for further evaluation  Cholelithiasis      The study was marked in EPIC for significant notification  Workstation performed: [id number]         Final Result by Bertha Matre DO (11/21 0247)      Diffusely thickened urinary bladder wall which is collapsed around a Saleh catheter balloon  Correlate for cystitis  Large left posterior urinary bladder diverticulum versus peritoneal cyst/mass  Recommend nonemergent urinary bladder ultrasound for further evaluation  Cholelithiasis      The study was marked in EPIC for significant notification  Workstation performed: [id number]           ·     Incidental Findings:   · None   Complications:  None   Hospital Course:     · Karrie Sandoval is a 80 y o  male patient who originally presented to the hospital on 11/20/2018 due to urinary Retention  He was discharged on 11/15 after passing voiding trial without Saleh, however 11/16 required Saleh placement in ED with return of acute urinary retention 2/2 BPH  · PCP removed Saleh on 11/21 and again pt presents with retention to the ED   ? Saleh placed again and 300 mL's of urine put out, no clots or gross hematuria   ? CTAP shows "Large left posterior urinary bladder diverticulum versus peritoneal cyst/mass   Recommend nonemergent urinary bladder ultrasound for further evaluation"Urology recommending maintaining saleh catheter with OP follow up   ? He was started on seroquel for sundowning and had been on prn Vicodin for back pain       Condition at Discharge: stable     Discharge Day Visit / Exam:     Subjective:  Denies complaints  Vitals: Blood Pressure: 170/76 (11/28/18 0700)  Pulse: 55 (11/28/18 0700)  Temperature: 98 3 °F (36 8 °C) (11/28/18 0700)  Temp Source: Oral (11/28/18 0700)  Respirations: 18 (11/28/18 0700)  Weight - Scale: 94 kg (207 lb 3 7 oz) (11/21/18 0002)  SpO2: 95 % (11/28/18 0700)  Exam:   Physical Exam   Constitutional: He is oriented to person, place, and time  No distress  HENT:   Head: Normocephalic and atraumatic  Mouth/Throat: Oropharynx is clear and moist    Eyes: Pupils are equal, round, and reactive to light  Conjunctivae are normal    Neck: Normal range of motion  Neck supple  Cardiovascular: Normal rate and regular rhythm  Pulmonary/Chest: Effort normal and breath sounds normal    Abdominal: Soft  Musculoskeletal: He exhibits no edema  Neurological: He is alert and oriented to person, place, and time  Skin: Skin is warm  He is not diaphoretic  Psychiatric: He has a normal mood and affect  Discussion with Family: D/w   Goals of Care Discussions:  · Code Status at Discharge: Level 3 - DNAR and DNI  · Were there any Goals of Care Discussions during Hospitalization?: No  · Results of any General Goals of Care Discussions: No   · POLST Completed: No  · If POLST Completed, Summary of POLST Agreement Provided Here: NO   · OK to Rehospitalize if Needed? Yes    Discharge instructions/Information to patient and family:   See after visit summary section titled Discharge Instructions for information provided to patient and family  Planned Readmission: NO      Discharge Statement:  I spent 35 minutes discharging the patient  This time was spent on the day of discharge  I had direct contact with the patient on the day of discharge  Greater than 50% of the total time was spent examining patient, answering all patient questions, arranging and discussing plan of care with patient as well as directly providing post-discharge instructions  Additional time then spent on discharge activities      ** Please Note: This note has been constructed using a voice recognition system **

## 2018-12-04 ENCOUNTER — TELEPHONE (OUTPATIENT)
Dept: UROLOGY | Facility: AMBULATORY SURGERY CENTER | Age: 83
End: 2018-12-04

## 2018-12-04 ENCOUNTER — HOSPITAL ENCOUNTER (INPATIENT)
Facility: HOSPITAL | Age: 83
LOS: 2 days | Discharge: NURSING, REHAB OR LONG-TERM CARE | DRG: 699 | End: 2018-12-07
Attending: EMERGENCY MEDICINE | Admitting: INTERNAL MEDICINE
Payer: COMMERCIAL

## 2018-12-04 ENCOUNTER — APPOINTMENT (EMERGENCY)
Dept: RADIOLOGY | Facility: HOSPITAL | Age: 83
DRG: 699 | End: 2018-12-04
Payer: COMMERCIAL

## 2018-12-04 DIAGNOSIS — R33.9 URINARY RETENTION: ICD-10-CM

## 2018-12-04 DIAGNOSIS — F03.90 DEMENTIA WITHOUT BEHAVIORAL DISTURBANCE, UNSPECIFIED DEMENTIA TYPE (HCC): ICD-10-CM

## 2018-12-04 DIAGNOSIS — R50.9 FEVER: Primary | ICD-10-CM

## 2018-12-04 DIAGNOSIS — R78.81 GRAM-POSITIVE BACTEREMIA: ICD-10-CM

## 2018-12-04 LAB
ALBUMIN SERPL BCP-MCNC: 2.9 G/DL (ref 3.5–5)
ALP SERPL-CCNC: 206 U/L (ref 46–116)
ALT SERPL W P-5'-P-CCNC: 21 U/L (ref 12–78)
ANION GAP SERPL CALCULATED.3IONS-SCNC: 10 MMOL/L (ref 4–13)
AST SERPL W P-5'-P-CCNC: 30 U/L (ref 5–45)
BACTERIA UR QL AUTO: ABNORMAL /HPF
BASOPHILS # BLD AUTO: 0.05 THOUSANDS/ΜL (ref 0–0.1)
BASOPHILS NFR BLD AUTO: 0 % (ref 0–1)
BILIRUB SERPL-MCNC: 0.6 MG/DL (ref 0.2–1)
BILIRUB UR QL STRIP: NEGATIVE
BUN SERPL-MCNC: 52 MG/DL (ref 5–25)
CALCIUM SERPL-MCNC: 8.3 MG/DL (ref 8.3–10.1)
CHLORIDE SERPL-SCNC: 101 MMOL/L (ref 100–108)
CLARITY UR: ABNORMAL
CO2 SERPL-SCNC: 25 MMOL/L (ref 21–32)
COLOR UR: YELLOW
CREAT SERPL-MCNC: 1.81 MG/DL (ref 0.6–1.3)
EOSINOPHIL # BLD AUTO: 0.01 THOUSAND/ΜL (ref 0–0.61)
EOSINOPHIL NFR BLD AUTO: 0 % (ref 0–6)
ERYTHROCYTE [DISTWIDTH] IN BLOOD BY AUTOMATED COUNT: 12.4 % (ref 11.6–15.1)
GFR SERPL CREATININE-BSD FRML MDRD: 32 ML/MIN/1.73SQ M
GLUCOSE SERPL-MCNC: 109 MG/DL (ref 65–140)
GLUCOSE UR STRIP-MCNC: NEGATIVE MG/DL
HCT VFR BLD AUTO: 40.6 % (ref 36.5–49.3)
HGB BLD-MCNC: 13.4 G/DL (ref 12–17)
HGB UR QL STRIP.AUTO: ABNORMAL
IMM GRANULOCYTES # BLD AUTO: 0.1 THOUSAND/UL (ref 0–0.2)
IMM GRANULOCYTES NFR BLD AUTO: 1 % (ref 0–2)
KETONES UR STRIP-MCNC: NEGATIVE MG/DL
LACTATE SERPL-SCNC: 1.4 MMOL/L (ref 0.5–2)
LEUKOCYTE ESTERASE UR QL STRIP: ABNORMAL
LYMPHOCYTES # BLD AUTO: 0.73 THOUSANDS/ΜL (ref 0.6–4.47)
LYMPHOCYTES NFR BLD AUTO: 6 % (ref 14–44)
MCH RBC QN AUTO: 30.5 PG (ref 26.8–34.3)
MCHC RBC AUTO-ENTMCNC: 33 G/DL (ref 31.4–37.4)
MCV RBC AUTO: 93 FL (ref 82–98)
MONOCYTES # BLD AUTO: 0.71 THOUSAND/ΜL (ref 0.17–1.22)
MONOCYTES NFR BLD AUTO: 6 % (ref 4–12)
NEUTROPHILS # BLD AUTO: 10 THOUSANDS/ΜL (ref 1.85–7.62)
NEUTS SEG NFR BLD AUTO: 87 % (ref 43–75)
NITRITE UR QL STRIP: NEGATIVE
NON-SQ EPI CELLS URNS QL MICRO: ABNORMAL /HPF
NRBC BLD AUTO-RTO: 0 /100 WBCS
OTHER STN SPEC: ABNORMAL
PH UR STRIP.AUTO: 5.5 [PH] (ref 4.5–8)
PLATELET # BLD AUTO: 173 THOUSANDS/UL (ref 149–390)
PMV BLD AUTO: 11.1 FL (ref 8.9–12.7)
POTASSIUM SERPL-SCNC: 4.6 MMOL/L (ref 3.5–5.3)
PROT SERPL-MCNC: 6.8 G/DL (ref 6.4–8.2)
PROT UR STRIP-MCNC: ABNORMAL MG/DL
RBC # BLD AUTO: 4.39 MILLION/UL (ref 3.88–5.62)
RBC #/AREA URNS AUTO: ABNORMAL /HPF
SODIUM SERPL-SCNC: 136 MMOL/L (ref 136–145)
SP GR UR STRIP.AUTO: >=1.03 (ref 1–1.03)
UROBILINOGEN UR QL STRIP.AUTO: 0.2 E.U./DL
WBC # BLD AUTO: 11.6 THOUSAND/UL (ref 4.31–10.16)
WBC #/AREA URNS AUTO: ABNORMAL /HPF

## 2018-12-04 PROCEDURE — 36415 COLL VENOUS BLD VENIPUNCTURE: CPT | Performed by: EMERGENCY MEDICINE

## 2018-12-04 PROCEDURE — 87040 BLOOD CULTURE FOR BACTERIA: CPT | Performed by: EMERGENCY MEDICINE

## 2018-12-04 PROCEDURE — 87086 URINE CULTURE/COLONY COUNT: CPT | Performed by: EMERGENCY MEDICINE

## 2018-12-04 PROCEDURE — 80053 COMPREHEN METABOLIC PANEL: CPT | Performed by: EMERGENCY MEDICINE

## 2018-12-04 PROCEDURE — 83605 ASSAY OF LACTIC ACID: CPT | Performed by: EMERGENCY MEDICINE

## 2018-12-04 PROCEDURE — 85025 COMPLETE CBC W/AUTO DIFF WBC: CPT | Performed by: EMERGENCY MEDICINE

## 2018-12-04 PROCEDURE — 87186 SC STD MICRODIL/AGAR DIL: CPT | Performed by: EMERGENCY MEDICINE

## 2018-12-04 PROCEDURE — 71046 X-RAY EXAM CHEST 2 VIEWS: CPT

## 2018-12-04 PROCEDURE — 99220 PR INITIAL OBSERVATION CARE/DAY 70 MINUTES: CPT | Performed by: INTERNAL MEDICINE

## 2018-12-04 PROCEDURE — 81001 URINALYSIS AUTO W/SCOPE: CPT | Performed by: EMERGENCY MEDICINE

## 2018-12-04 PROCEDURE — 87077 CULTURE AEROBIC IDENTIFY: CPT | Performed by: EMERGENCY MEDICINE

## 2018-12-04 PROCEDURE — 99285 EMERGENCY DEPT VISIT HI MDM: CPT

## 2018-12-04 RX ORDER — FINASTERIDE 5 MG/1
5 TABLET, FILM COATED ORAL DAILY
Status: DISCONTINUED | OUTPATIENT
Start: 2018-12-05 | End: 2018-12-07 | Stop reason: HOSPADM

## 2018-12-04 RX ORDER — CIPROFLOXACIN 500 MG/1
500 TABLET, FILM COATED ORAL EVERY 12 HOURS SCHEDULED
COMMUNITY
End: 2018-12-07 | Stop reason: HOSPADM

## 2018-12-04 RX ORDER — HEPARIN SODIUM 5000 [USP'U]/ML
5000 INJECTION, SOLUTION INTRAVENOUS; SUBCUTANEOUS EVERY 8 HOURS SCHEDULED
Status: DISCONTINUED | OUTPATIENT
Start: 2018-12-04 | End: 2018-12-07 | Stop reason: HOSPADM

## 2018-12-04 RX ORDER — SODIUM CHLORIDE 9 MG/ML
100 INJECTION, SOLUTION INTRAVENOUS CONTINUOUS
Status: DISCONTINUED | OUTPATIENT
Start: 2018-12-04 | End: 2018-12-07 | Stop reason: HOSPADM

## 2018-12-04 RX ORDER — LANOLIN ALCOHOL/MO/W.PET/CERES
3 CREAM (GRAM) TOPICAL
Status: DISCONTINUED | OUTPATIENT
Start: 2018-12-04 | End: 2018-12-07 | Stop reason: HOSPADM

## 2018-12-04 RX ORDER — QUETIAPINE FUMARATE 25 MG/1
25 TABLET, FILM COATED ORAL
Status: DISCONTINUED | OUTPATIENT
Start: 2018-12-04 | End: 2018-12-04

## 2018-12-04 RX ORDER — CIPROFLOXACIN 500 MG/1
500 TABLET, FILM COATED ORAL EVERY 12 HOURS SCHEDULED
Status: DISCONTINUED | OUTPATIENT
Start: 2018-12-04 | End: 2018-12-04

## 2018-12-04 RX ORDER — CHOLECALCIFEROL (VITAMIN D3) 125 MCG
500 CAPSULE ORAL DAILY
Status: DISCONTINUED | OUTPATIENT
Start: 2018-12-05 | End: 2018-12-07 | Stop reason: HOSPADM

## 2018-12-04 RX ORDER — QUETIAPINE FUMARATE 25 MG/1
12.5 TABLET, FILM COATED ORAL DAILY
Status: DISCONTINUED | OUTPATIENT
Start: 2018-12-05 | End: 2018-12-07 | Stop reason: HOSPADM

## 2018-12-04 RX ORDER — HYDROCODONE BITARTRATE AND ACETAMINOPHEN 5; 325 MG/1; MG/1
1 TABLET ORAL EVERY 6 HOURS PRN
Status: DISCONTINUED | OUTPATIENT
Start: 2018-12-04 | End: 2018-12-07 | Stop reason: HOSPADM

## 2018-12-04 RX ORDER — GABAPENTIN 300 MG/1
300 CAPSULE ORAL 3 TIMES DAILY
Status: DISCONTINUED | OUTPATIENT
Start: 2018-12-04 | End: 2018-12-07 | Stop reason: HOSPADM

## 2018-12-04 RX ORDER — ACETAMINOPHEN 325 MG/1
650 TABLET ORAL EVERY 6 HOURS PRN
Status: DISCONTINUED | OUTPATIENT
Start: 2018-12-04 | End: 2018-12-07 | Stop reason: HOSPADM

## 2018-12-04 RX ORDER — QUETIAPINE FUMARATE 25 MG/1
25 TABLET, FILM COATED ORAL
Status: DISCONTINUED | OUTPATIENT
Start: 2018-12-04 | End: 2018-12-07 | Stop reason: HOSPADM

## 2018-12-04 RX ORDER — HYDROCODONE BITARTRATE AND ACETAMINOPHEN 5; 325 MG/1; MG/1
1 TABLET ORAL ONCE
Status: COMPLETED | OUTPATIENT
Start: 2018-12-04 | End: 2018-12-04

## 2018-12-04 RX ORDER — CEFAZOLIN SODIUM 1 G/50ML
1000 SOLUTION INTRAVENOUS EVERY 12 HOURS
Status: DISCONTINUED | OUTPATIENT
Start: 2018-12-04 | End: 2018-12-05

## 2018-12-04 RX ORDER — AMLODIPINE BESYLATE 10 MG/1
10 TABLET ORAL DAILY
Status: DISCONTINUED | OUTPATIENT
Start: 2018-12-05 | End: 2018-12-07 | Stop reason: HOSPADM

## 2018-12-04 RX ORDER — SENNOSIDES 8.6 MG
1 TABLET ORAL
Status: DISCONTINUED | OUTPATIENT
Start: 2018-12-04 | End: 2018-12-07 | Stop reason: HOSPADM

## 2018-12-04 RX ORDER — QUETIAPINE FUMARATE 25 MG/1
12.5 TABLET, FILM COATED ORAL DAILY
COMMUNITY

## 2018-12-04 RX ORDER — TAMSULOSIN HYDROCHLORIDE 0.4 MG/1
0.4 CAPSULE ORAL
Status: DISCONTINUED | OUTPATIENT
Start: 2018-12-04 | End: 2018-12-07 | Stop reason: HOSPADM

## 2018-12-04 RX ORDER — ALPRAZOLAM 0.25 MG/1
0.25 TABLET ORAL 3 TIMES DAILY PRN
Status: DISCONTINUED | OUTPATIENT
Start: 2018-12-04 | End: 2018-12-07 | Stop reason: HOSPADM

## 2018-12-04 RX ORDER — BISACODYL 10 MG
10 SUPPOSITORY, RECTAL RECTAL DAILY PRN
Status: DISCONTINUED | OUTPATIENT
Start: 2018-12-04 | End: 2018-12-07 | Stop reason: HOSPADM

## 2018-12-04 RX ORDER — SODIUM CHLORIDE 1000 MG
2 TABLET, SOLUBLE MISCELLANEOUS DAILY
Status: DISCONTINUED | OUTPATIENT
Start: 2018-12-05 | End: 2018-12-07 | Stop reason: HOSPADM

## 2018-12-04 RX ADMIN — SODIUM CHLORIDE 100 ML/HR: 0.9 INJECTION, SOLUTION INTRAVENOUS at 18:05

## 2018-12-04 RX ADMIN — GABAPENTIN 300 MG: 300 CAPSULE ORAL at 20:05

## 2018-12-04 RX ADMIN — GABAPENTIN 300 MG: 300 CAPSULE ORAL at 18:06

## 2018-12-04 RX ADMIN — ALPRAZOLAM 0.25 MG: 0.25 TABLET ORAL at 18:05

## 2018-12-04 RX ADMIN — HYDROCODONE BITARTRATE AND ACETAMINOPHEN 1 TABLET: 5; 325 TABLET ORAL at 13:21

## 2018-12-04 RX ADMIN — TAMSULOSIN HYDROCHLORIDE 0.4 MG: 0.4 CAPSULE ORAL at 18:06

## 2018-12-04 RX ADMIN — CEFAZOLIN SODIUM 1000 MG: 1 SOLUTION INTRAVENOUS at 18:05

## 2018-12-04 RX ADMIN — SODIUM CHLORIDE 500 ML: 0.9 INJECTION, SOLUTION INTRAVENOUS at 12:30

## 2018-12-04 RX ADMIN — QUETIAPINE FUMARATE 25 MG: 25 TABLET ORAL at 20:05

## 2018-12-04 NOTE — ED PROVIDER NOTES
History  Chief Complaint   Patient presents with    Fever - 76 years or older     Per [facility] where pt is living, pt has been having fevers and had + blood cultures  Pt presents with no complaints  59-year-old male presents today from [facility] with concern for positive blood cultures of an outpatient  The facility states that he had a fever yesterday and blood cultures drawn this morning which returned back today already 1 out of 2 positive for Staph  They did not send the report of the blood cultures  Patient currently has no complaints  Of note he did pull out his urinary catheter yesterday and had a new 1 replaced  History provided by:  Patient, nursing home and spouse  Fever - 76 years or older   Max temp prior to arrival:  80  Temp source:  Unable to specify  Onset quality:  Sudden  Duration:  1 day  Progression:  Resolved  Relieved by:  None tried  Worsened by:  Nothing  Ineffective treatments:  None tried  Associated symptoms: no chest pain, no diarrhea, no dysuria, no headaches, no rhinorrhea and no vomiting    Risk factors: no immunosuppression and no recent sickness        Prior to Admission Medications   Prescriptions Last Dose Informant Patient Reported? Taking?    ALPRAZolam (XANAX) 0 25 mg tablet   No Yes   Sig: Take 1 tablet (0 25 mg total) by mouth 3 (three) times a day as needed for anxiety for up to 10 days   HYDROcodone-acetaminophen (VICODIN) 5-300 MG per tablet   No Yes   Sig: Take 1 tablet by mouth every 6 (six) hours as needed for moderate pain for up to 10 days Max Daily Amount: 4 tablets   QUEtiapine (SEROquel) 25 mg tablet   No Yes   Sig: Take 1 tablet (25 mg total) by mouth daily at bedtime   QUEtiapine (SEROquel) 25 mg tablet   Yes Yes   Sig: Take 12 5 mg by mouth daily   acetaminophen (TYLENOL) 325 mg tablet   No Yes   Sig: Take 2 tablets (650 mg total) by mouth every 6 (six) hours as needed for mild pain, headaches or fever   amLODIPine (NORVASC) 10 mg tablet   No Yes   Sig: Take 1 tablet (10 mg total) by mouth daily   bisacodyl (DULCOLAX) 10 mg suppository   No Yes   Sig: Insert 1 suppository (10 mg total) into the rectum daily as needed for constipation for up to 20 doses   ciprofloxacin (CIPRO) 500 mg tablet   Yes Yes   Sig: Take 500 mg by mouth every 12 (twelve) hours   cyanocobalamin (VITAMIN B-12) 100 mcg tablet   Yes Yes   Sig: Take by mouth daily   finasteride (PROSCAR) 5 mg tablet   No Yes   Sig: Take 1 tablet (5 mg total) by mouth daily   gabapentin (NEURONTIN) 300 mg capsule   No Yes   Sig: Take 1 capsule (300 mg total) by mouth 3 (three) times a day   melatonin 3 mg   No Yes   Sig: Take 1 tablet (3 mg total) by mouth daily at bedtime   senna (SENOKOT) 8 6 mg   No Yes   Sig: Take 1 tablet (8 6 mg total) by mouth daily at bedtime   sodium chloride 1 g tablet   Yes Yes   Sig: Take 2 g by mouth daily   tamsulosin (FLOMAX) 0 4 mg   Yes Yes   Sig: Take 0 4 mg by mouth        Facility-Administered Medications: None       Past Medical History:   Diagnosis Date    Gall stone     Hypotension     Throat cancer ([facility] [address] )     Urinary retention        Past Surgical History:   Procedure Laterality Date    TONSILLECTOMY         Family History   Problem Relation Age of Onset    No Known Problems Mother     No Known Problems Father     No Known Problems Sister     No Known Problems Brother     No Known Problems Son     No Known Problems Daughter     No Known Problems Maternal Grandmother     No Known Problems Maternal Grandfather     No Known Problems Paternal Grandmother     No Known Problems Paternal Grandfather     No Known Problems Cousin     Rheum arthritis Neg Hx     Osteoarthritis Neg Hx     Asthma Neg Hx     Diabetes Neg Hx     Heart failure Neg Hx     Hyperlipidemia Neg Hx     Hypertension Neg Hx     Migraines Neg Hx     Rashes / Skin problems Neg Hx     Seizures Neg Hx     Stroke Neg Hx     Thyroid disease Neg Hx      I have reviewed and agree with the history as documented  Social History   Substance Use Topics    Smoking status: Never Smoker    Smokeless tobacco: Never Used    Alcohol use No        Review of Systems   Unable to perform ROS: Dementia   HENT: Negative for rhinorrhea  Cardiovascular: Negative for chest pain  Gastrointestinal: Negative for diarrhea and vomiting  Genitourinary: Negative for dysuria  Neurological: Negative for headaches  Physical Exam  Physical Exam   Constitutional: He appears well-developed and well-nourished  HENT:   Head: Normocephalic and atraumatic  Mouth/Throat: Uvula is midline, oropharynx is clear and moist and mucous membranes are normal  No tonsillar exudate  Eyes: Pupils are equal, round, and reactive to light  Neck: Normal range of motion  Neck supple  Cardiovascular: Normal rate and regular rhythm  Pulmonary/Chest: Effort normal and breath sounds normal    Abdominal: Soft  Bowel sounds are normal  There is no tenderness  There is no rebound and no guarding  Musculoskeletal: Normal range of motion  Neurological: He is alert  Patient moving all extremities equally, no focal neuro deficits noted  Skin: Skin is warm and dry  Psychiatric: He has a normal mood and affect  Nursing note and vitals reviewed        Vital Signs  ED Triage Vitals [12/04/18 1121]   Temperature Pulse Respirations Blood Pressure SpO2   (!) 97 4 °F (36 3 °C) 64 18 125/97 95 %      Temp Source Heart Rate Source Patient Position - Orthostatic VS BP Location FiO2 (%)   Oral Monitor Lying Right arm --      Pain Score       No Pain           Vitals:    12/04/18 1121 12/04/18 1245 12/04/18 1400 12/04/18 1534   BP: 125/97 135/63 145/65 124/57   Pulse: 64 74 70 62   Patient Position - Orthostatic VS: Lying  Lying Lying       Visual Acuity      ED Medications  Medications   sodium chloride 0 9 % bolus 500 mL (0 mL Intravenous Stopped 12/4/18 1255)   HYDROcodone-acetaminophen (NORCO) 5-325 mg per tablet 1 tablet (1 tablet Oral Given 12/4/18 1321)       Diagnostic Studies  Results Reviewed     Procedure Component Value Units Date/Time    Urine Microscopic [[id number]]  (Abnormal) Collected:  12/04/18 1254    Lab Status:  Final result Specimen:  Urine from Urine, Indwelling Ruelas Catheter Updated:  12/04/18 1311     RBC, UA 20-30 (A) /hpf      WBC, UA Innumerable (A) /hpf      Epithelial Cells Occasional /hpf      Bacteria, UA Moderate (A) /hpf      OTHER OBSERVATIONS Sperm Present    Urine culture [[id number]] Collected:  12/04/18 1254    Lab Status: In process Specimen:  Urine from Urine, Indwelling Ruelas Catheter Updated:  12/04/18 1311    UA w Reflex to Microscopic w Reflex to Culture [[id number]]  (Abnormal) Collected:  12/04/18 1254    Lab Status:  Final result Specimen:  Urine from Urine, Indwelling Ruelas Catheter Updated:  12/04/18 1301     Color, UA Yellow     Clarity, UA Cloudy     Specific Gravity, UA >=1 030     pH, UA 5 5     Leukocytes, UA Small (A)     Nitrite, UA Negative     Protein,  (2+) (A) mg/dl      Glucose, UA Negative mg/dl      Ketones, UA Negative mg/dl      Urobilinogen, UA 0 2 E U /dl      Bilirubin, UA Negative     Blood, UA Large (A)    Blood culture #1 [[id number]] Collected:  12/04/18 1153    Lab Status: In process Specimen:  Blood from Arm, Left Updated:  12/04/18 1204    Lactic acid, plasma [[id number]]  (Normal) Collected:  12/04/18 1130    Lab Status:  Final result Specimen:  Blood from Arm, Right Updated:  12/04/18 1156     LACTIC ACID 1 4 mmol/L     Narrative:         Result may be elevated if tourniquet was used during collection      Comprehensive metabolic panel [[id number]]  (Abnormal) Collected:  12/04/18 1130    Lab Status:  Final result Specimen:  Blood from Arm, Right Updated:  12/04/18 1154     Sodium 136 mmol/L      Potassium 4 6 mmol/L      Chloride 101 mmol/L      CO2 25 mmol/L      ANION GAP 10 mmol/L      BUN 52 (H) mg/dL      Creatinine 1 81 (H) mg/dL Glucose 109 mg/dL      Calcium 8 3 mg/dL      AST 30 U/L      ALT 21 U/L      Alkaline Phosphatase 206 (H) U/L      Total Protein 6 8 g/dL      Albumin 2 9 (L) g/dL      Total Bilirubin 0 60 mg/dL      eGFR 32 ml/min/1 73sq m     Narrative:         National Kidney Disease Education Program recommendations are as follows:  GFR calculation is accurate only with a steady state creatinine  Chronic Kidney disease less than 60 ml/min/1 73 sq  meters  Kidney failure less than 15 ml/min/1 73 sq  meters  Blood culture #2 [[id number]] Collected:  12/04/18 1130    Lab Status: In process Specimen:  Blood from Arm, Right Updated:  12/04/18 1139    CBC and differential [[id number]]  (Abnormal) Collected:  12/04/18 1130    Lab Status:  Final result Specimen:  Blood from Arm, Right Updated:  12/04/18 1138     WBC 11 60 (H) Thousand/uL      RBC 4 39 Million/uL      Hemoglobin 13 4 g/dL      Hematocrit 40 6 %      MCV 93 fL      MCH 30 5 pg      MCHC 33 0 g/dL      RDW 12 4 %      MPV 11 1 fL      Platelets 167 Thousands/uL      nRBC 0 /100 WBCs      Neutrophils Relative 87 (H) %      Immat GRANS % 1 %      Lymphocytes Relative 6 (L) %      Monocytes Relative 6 %      Eosinophils Relative 0 %      Basophils Relative 0 %      Neutrophils Absolute 10 00 (H) Thousands/µL      Immature Grans Absolute 0 10 Thousand/uL      Lymphocytes Absolute 0 73 Thousands/µL      Monocytes Absolute 0 71 Thousand/µL      Eosinophils Absolute 0 01 Thousand/µL      Basophils Absolute 0 05 Thousands/µL                  XR chest 2 views   Final Result by Saeid Bowen DO (12/04 1255)   Diminished inspiration  Bibasilar atelectasis        Workstation performed: [id number]                    Procedures  Procedures       Phone Contacts  ED Phone Contact    ED Course                               MDM  Number of Diagnoses or Management Options  Fever: new and requires workup  Diagnosis management comments: MDM: Patient presents to the Emergency Department and was diagnosed with acute fever with possible positive blood cultures as an outpatient  This is a new problem that will require additional planned work-up in a hospitalized setting  Clinical laboratory testing, radiology imaging, and medical testing (EKG) were ordered  I independently reviewed the radiologic imaging, EKG, and laboratory studies  This case is considered high risk secondary to the above listed disease process that poses a threat to bodily function that requires further diagnostic testing and management which may include the administration of parenteral controlled substances  Discussed with LORE  We had a detailed discussion of the patient's condition and case,  including need for admission  Accepts to his/her service  Bed request/bridging orders placed  Amount and/or Complexity of Data Reviewed  Clinical lab tests: ordered and reviewed  Tests in the radiology section of CPT®: ordered and reviewed  Tests in the medicine section of CPT®: reviewed and ordered  Decide to obtain previous medical records or to obtain history from someone other than the patient: yes  Review and summarize past medical records: yes  Discuss the patient with other providers: yes  Independent visualization of images, tracings, or specimens: yes    Risk of Complications, Morbidity, and/or Mortality  Presenting problems: high  Diagnostic procedures: high  Management options: high    Patient Progress  Patient progress: stable    CritCare Time    Disposition  Final diagnoses:   Fever     Time reflects when diagnosis was documented in both MDM as applicable and the Disposition within this note     Time User Action Codes Description Comment    12/4/2018 12:52 PM Shasta Erazo Add [R50 9] Fever       ED Disposition     ED Disposition Condition Comment    Admit  Case was discussed with LORE and the patient's admission status was agreed to be Admission Status: observation status to the service of Dr Ana Kc   Follow-up Information    None         Current Discharge Medication List      CONTINUE these medications which have NOT CHANGED    Details   acetaminophen (TYLENOL) 325 mg tablet Take 2 tablets (650 mg total) by mouth every 6 (six) hours as needed for mild pain, headaches or fever  Qty: 30 tablet, Refills: 0    Associated Diagnoses: Dementia without behavioral disturbance, unspecified dementia type      ALPRAZolam (XANAX) 0 25 mg tablet Take 1 tablet (0 25 mg total) by mouth 3 (three) times a day as needed for anxiety for up to 10 days  Qty: 10 tablet, Refills: 0    Associated Diagnoses: Dementia without behavioral disturbance, unspecified dementia type      amLODIPine (NORVASC) 10 mg tablet Take 1 tablet (10 mg total) by mouth daily  Qty: 30 tablet, Refills: 0    Associated Diagnoses: Acute-on-chronic kidney injury (HCC)      bisacodyl (DULCOLAX) 10 mg suppository Insert 1 suppository (10 mg total) into the rectum daily as needed for constipation for up to 20 doses  Qty: 20 suppository, Refills: 0    Associated Diagnoses: Urinary retention      ciprofloxacin (CIPRO) 500 mg tablet Take 500 mg by mouth every 12 (twelve) hours      cyanocobalamin (VITAMIN B-12) 100 mcg tablet Take by mouth daily      finasteride (PROSCAR) 5 mg tablet Take 1 tablet (5 mg total) by mouth daily  Qty: 30 tablet, Refills: 1    Associated Diagnoses: Urinary retention      gabapentin (NEURONTIN) 300 mg capsule Take 1 capsule (300 mg total) by mouth 3 (three) times a day  Qty: 90 capsule, Refills: 0    Associated Diagnoses: History of throat cancer      HYDROcodone-acetaminophen (VICODIN) 5-300 MG per tablet Take 1 tablet by mouth every 6 (six) hours as needed for moderate pain for up to 10 days Max Daily Amount: 4 tablets  Qty: 10 tablet, Refills: 0    Associated Diagnoses: Dementia without behavioral disturbance, unspecified dementia type      melatonin 3 mg Take 1 tablet (3 mg total) by mouth daily at bedtime  Qty: 30 tablet, Refills: 0 Associated Diagnoses: Dementia without behavioral disturbance, unspecified dementia type      !! QUEtiapine (SEROquel) 25 mg tablet Take 1 tablet (25 mg total) by mouth daily at bedtime  Qty: 30 tablet, Refills: 0    Associated Diagnoses: Dementia without behavioral disturbance, unspecified dementia type      !! QUEtiapine (SEROquel) 25 mg tablet Take 12 5 mg by mouth daily      senna (SENOKOT) 8 6 mg Take 1 tablet (8 6 mg total) by mouth daily at bedtime  Qty: 30 each, Refills: 0    Associated Diagnoses: Urinary retention      sodium chloride 1 g tablet Take 2 g by mouth daily      tamsulosin (FLOMAX) 0 4 mg Take 0 4 mg by mouth         !! - Potential duplicate medications found  Please discuss with provider  No discharge procedures on file      ED Provider  Electronically Signed by           Karie Ortiz DO  12/04/18 8663

## 2018-12-04 NOTE — H&P
H&P- Breanna Browncaesar 8/14/1926, 80 y o  male MRN: [id number]    Unit/Bed#: -Umberto Encounter: [id number]    Primary Care Provider: Biju Leslie DO   Date and time admitted to hospital: 12/4/2018 11:14 AM    * Fever   Assessment & Plan    · Noted to have fever at [facility] past 2 days with Tmax 102  · Patient had urine cx and blood cx taken yesterday  Received fax from [facility] with results  · Urine cx from saleh: >100,000 cfu/mL staph species and >100,000 cfu/mL strep species  · Blood cx x2: gram + cocci in clusters  · Sent to ER for further evaluation  · Was given 2 doses of cipro at [facility]  Will change to IV ancef for now  F/u repeat cultures  Would call [facility] on 12/5 to see if final results back  · Consult ID     Gram-positive bacteremia   Assessment & Plan    · Records reviewed from [facility]  Blood cultures collected 12/3 are both positive for gram-positive cocci in clusters  Was given 2 doses of Cipro at [facility]  · Changed to IV Ancef  · Follow-up cultures here  · Obtain Infectious Disease evaluation     Acute-on-chronic kidney injury [facility])   Assessment & Plan    · Baseline creatinine appears to be between 1 2 and 1 4  Likely pre renal in setting of acute illness  Continue IV fluids  · Trend BMP     Urinary retention   Assessment & Plan    · Ongoing issue  Has Saleh catheter  Would maintain and continue outpatient follow-up with Urology  · Maintain Proscar     Dementia   Assessment & Plan    · Supportive care  Redirect and reorient as possible  Patient is on Seroquel 12 5 mg q a m  And 25 mg q p m  With good benefit per family  · Frequently pulls out Saleh catheter, monitor closely        VTE Prophylaxis: Heparin  / sequential compression device   Code Status: DNR 3  POLST: POLST form is not discussed and not completed at this time    Discussion with family: daughter at bedside    Anticipated Length of Stay:  Patient will be admitted on an Observation basis with an anticipated length of stay of  < 2 midnights  Justification for Hospital Stay: as above    Total Time for Visit, including Counseling / Coordination of Care: 1 hour  Greater than 50% of this total time spent on direct patient counseling and coordination of care  Chief Complaint:   Fever, positive cultures    History of Present Illness: Giselle Love is a 80 y o  male with past medical history of urinary retention with chronic Ruelas catheter, dementia, history of throat cancer, hypertension who presents after being sent in by [facility] for fever and positive blood cultures x2  Initially the ER was told that patient only had 1/2 positive blood cultures and fevers  However upon calling [facility] and obtaining records, it was noted that both sets of blood cultures were positive for gram-positive cocci in clusters  Urine was also cultured from Ruelas growing Staph and Strep species  Patient reportedly has had fever of 102 for the past few days  He was given 2 doses of ciprofloxacin at [facility]  He has had no symptoms  Mental status has been essentially at baseline however per daughter at bedside he seems a bit more confused right now  Patient pulled out catheter 2 days ago and required replacement  Review of Systems:    Review of Systems   Constitutional: Positive for fever  Negative for appetite change, chills and fatigue  HENT: Negative  Respiratory: Negative  Cardiovascular: Negative  Gastrointestinal: Negative  Genitourinary: Negative  Neurological: Negative  All other systems reviewed and are negative  Past Medical and Surgical History:     Past Medical History:   Diagnosis Date    Gall stone     Hypotension     Throat cancer ([address] )     Urinary retention        Past Surgical History:   Procedure Laterality Date    TONSILLECTOMY         Meds/Allergies:    Prior to Admission medications    Medication Sig Start Date End Date Taking?  Authorizing Provider acetaminophen (TYLENOL) 325 mg tablet Take 2 tablets (650 mg total) by mouth every 6 (six) hours as needed for mild pain, headaches or fever 11/27/18  Yes Jagdish Schultz MD   ALPRAZolam Durel Alt) 0 25 mg tablet Take 1 tablet (0 25 mg total) by mouth 3 (three) times a day as needed for anxiety for up to 10 days 11/27/18 12/7/18 Yes Jagdish Schultz MD   amLODIPine (NORVASC) 10 mg tablet Take 1 tablet (10 mg total) by mouth daily 11/28/18  Yes Jagdish Schultz MD   bisacodyl (DULCOLAX) 10 mg suppository Insert 1 suppository (10 mg total) into the rectum daily as needed for constipation for up to 20 doses 11/15/18  Yes LONI Spencer   ciprofloxacin (CIPRO) 500 mg tablet Take 500 mg by mouth every 12 (twelve) hours   Yes Historical Provider, MD   cyanocobalamin (VITAMIN B-12) 100 mcg tablet Take by mouth daily   Yes Historical Provider, MD   finasteride (PROSCAR) 5 mg tablet Take 1 tablet (5 mg total) by mouth daily 11/16/18  Yes LONI Spencer   gabapentin (NEURONTIN) 300 mg capsule Take 1 capsule (300 mg total) by mouth 3 (three) times a day 11/15/18  Yes LONI Spencer   HYDROcodone-acetaminophen (VICODIN) 5-300 MG per tablet Take 1 tablet by mouth every 6 (six) hours as needed for moderate pain for up to 10 days Max Daily Amount: 4 tablets 11/27/18 12/7/18 Yes Jagdish Schultz MD   melatonin 3 mg Take 1 tablet (3 mg total) by mouth daily at bedtime 11/27/18  Yes Jagdish Schultz MD   QUEtiapine (SEROquel) 25 mg tablet Take 1 tablet (25 mg total) by mouth daily at bedtime 11/27/18  Yes Jagdish Schultz MD   QUEtiapine (SEROquel) 25 mg tablet Take 12 5 mg by mouth daily   Yes Historical Provider, MD   senna (SENOKOT) 8 6 mg Take 1 tablet (8 6 mg total) by mouth daily at bedtime 11/15/18  Yes LONI Spencer   sodium chloride 1 g tablet Take 2 g by mouth daily   Yes Historical Provider, MD   tamsulosin (FLOMAX) 0 4 mg Take 0 4 mg by mouth   10/12/17  Yes Historical Provider, MD I have reviewed home medications with patient family member  Allergies: No Known Allergies    Social History:     Marital Status: Registered Domestic Partner   Occupation:  Retired  Patient Pre-hospital Living Situation:  Lives at [facility]  Patient Pre-hospital Level of Mobility:  Relatively independent  Patient Pre-hospital Diet Restrictions:  None  Substance Use History:   History   Alcohol Use No     History   Smoking Status    Never Smoker   Smokeless Tobacco    Never Used     History   Drug Use No       Family History:    non-contributory    Physical Exam:     Vitals:   Blood Pressure: 124/57 (12/04/18 1534)  Pulse: 62 (12/04/18 1534)  Temperature: 98 1 °F (36 7 °C) (12/04/18 1534)  Temp Source: Oral (12/04/18 1534)  Respirations: 18 (12/04/18 1534)  Weight - Scale: 93 2 kg (205 lb 7 5 oz) (12/04/18 1119)  SpO2: 96 % (12/04/18 1534)    Physical Exam   Constitutional: No distress  Frequently pulling at catheter; appears younger than stated age   HENT:   Mouth/Throat: No oropharyngeal exudate  Cardiovascular: Normal rate and regular rhythm  Pulmonary/Chest: Effort normal and breath sounds normal  No respiratory distress  He has no wheezes  He has no rales  Abdominal: Soft  Bowel sounds are normal  He exhibits no distension  There is no tenderness  Genitourinary:   Genitourinary Comments: Ruelas catheter noted draining slightly cloudy yellow urine   Neurological: He is alert  Oriented to self only   Skin: He is not diaphoretic  Psychiatric:   Very pleasant, follows commands   Nursing note and vitals reviewed  Additional Data:     Lab Results: I have personally reviewed pertinent reports          Results from last 7 days  Lab Units 12/04/18  1130   WBC Thousand/uL 11 60*   HEMOGLOBIN g/dL 13 4   HEMATOCRIT % 40 6   PLATELETS Thousands/uL 173   NEUTROS PCT % 87*   LYMPHS PCT % 6*   MONOS PCT % 6   EOS PCT % 0       Results from last 7 days  Lab Units 12/04/18  1130   SODIUM mmol/L 136   POTASSIUM mmol/L 4 6   CHLORIDE mmol/L 101   CO2 mmol/L 25   BUN mg/dL 52*   CREATININE mg/dL 1 81*   ANION GAP mmol/L 10   CALCIUM mg/dL 8 3   ALBUMIN g/dL 2 9*   TOTAL BILIRUBIN mg/dL 0 60   ALK PHOS U/L 206*   ALT U/L 21   AST U/L 30   GLUCOSE RANDOM mg/dL 109                   Results from last 7 days  Lab Units 12/04/18  1130   LACTIC ACID mmol/L 1 4       Imaging: I have personally reviewed pertinent reports  XR chest 2 views   Final Result by Luis Carlos Fisher DO (12/04 1255)   Diminished inspiration  Bibasilar atelectasis  Workstation performed: [id number]             EKG, Pathology, and Other Studies Reviewed on Admission:   · EKG:  None on file    Allscripts / Epic Records Reviewed: Yes     ** Please Note: This note has been constructed using a voice recognition system   **

## 2018-12-04 NOTE — ASSESSMENT & PLAN NOTE
· Records reviewed from [facility]  Blood cultures collected 12/3 are both positive for gram-positive cocci in clusters    Was given 2 doses of Cipro at [facility]  · Changed to IV Ancef  · Follow-up cultures here  · Obtain Infectious Disease evaluation

## 2018-12-04 NOTE — ASSESSMENT & PLAN NOTE
· Ongoing issue  Has Ruelas catheter    Would maintain and continue outpatient follow-up with Urology  · Maintain Proscar

## 2018-12-04 NOTE — ASSESSMENT & PLAN NOTE
· Baseline creatinine appears to be between 1 2 and 1 4  Likely pre renal in setting of acute illness    Continue IV fluids  · Trend BMP

## 2018-12-04 NOTE — ASSESSMENT & PLAN NOTE
· Noted to have fever at [facility] past 2 days with Tmax 102  · Patient had urine cx and blood cx taken yesterday  Received fax from [facility] with results  · Urine cx from saleh: >100,000 cfu/mL staph species and >100,000 cfu/mL strep species  · Blood cx x2: gram + cocci in clusters  · Sent to ER for further evaluation  · Was given 2 doses of cipro at SNF  Will change to IV ancef for now  F/u repeat cultures   Would call [facility] on 12/5 to see if final results back  · Consult ID

## 2018-12-04 NOTE — TELEPHONE ENCOUNTER
Patient's daughter Ar Cordero called would like to speak with the nurse, she had some questions about his saleh   She can be reached at [phone]

## 2018-12-04 NOTE — TELEPHONE ENCOUNTER
Called and spoke to daughter, patient had been evaluated by urology at prior hospital admission , urinary retention, catheter, currently scheduled for follow up in 3 weeks per recommendation 12/18/18 and to maintain catheter  Daughter states they are in hospital now, patient being evaluated for fever and infection  She is requesting to be seen sooner by outpatient urology  States patient has dementia and that he is pulling out his catheter on his own due to confusion  Advised her at this time we cannot give further recommendation as he is in ER and possibly being admitted  Advised her to discuss with physician in hospital and call back after discharge for further recommendation based on current admission for fever

## 2018-12-04 NOTE — ASSESSMENT & PLAN NOTE
· Supportive care  Redirect and reorient as possible  Patient is on Seroquel 12 5 mg q a m  And 25 mg q p m  With good benefit per family    · Frequently pulls out Ruelas catheter, monitor closely

## 2018-12-05 ENCOUNTER — APPOINTMENT (INPATIENT)
Dept: NON INVASIVE DIAGNOSTICS | Facility: HOSPITAL | Age: 83
DRG: 699 | End: 2018-12-05
Payer: COMMERCIAL

## 2018-12-05 LAB
ANION GAP SERPL CALCULATED.3IONS-SCNC: 9 MMOL/L (ref 4–13)
BASOPHILS # BLD AUTO: 0.05 THOUSANDS/ΜL (ref 0–0.1)
BASOPHILS NFR BLD AUTO: 1 % (ref 0–1)
BUN SERPL-MCNC: 40 MG/DL (ref 5–25)
CALCIUM SERPL-MCNC: 8.4 MG/DL (ref 8.3–10.1)
CHLORIDE SERPL-SCNC: 104 MMOL/L (ref 100–108)
CO2 SERPL-SCNC: 22 MMOL/L (ref 21–32)
CREAT SERPL-MCNC: 1.31 MG/DL (ref 0.6–1.3)
EOSINOPHIL # BLD AUTO: 0.08 THOUSAND/ΜL (ref 0–0.61)
EOSINOPHIL NFR BLD AUTO: 1 % (ref 0–6)
ERYTHROCYTE [DISTWIDTH] IN BLOOD BY AUTOMATED COUNT: 12.4 % (ref 11.6–15.1)
GFR SERPL CREATININE-BSD FRML MDRD: 47 ML/MIN/1.73SQ M
GLUCOSE P FAST SERPL-MCNC: 100 MG/DL (ref 65–99)
GLUCOSE SERPL-MCNC: 100 MG/DL (ref 65–140)
HCT VFR BLD AUTO: 39.6 % (ref 36.5–49.3)
HGB BLD-MCNC: 13.3 G/DL (ref 12–17)
IMM GRANULOCYTES # BLD AUTO: 0.09 THOUSAND/UL (ref 0–0.2)
IMM GRANULOCYTES NFR BLD AUTO: 1 % (ref 0–2)
LYMPHOCYTES # BLD AUTO: 1.41 THOUSANDS/ΜL (ref 0.6–4.47)
LYMPHOCYTES NFR BLD AUTO: 16 % (ref 14–44)
MCH RBC QN AUTO: 30.9 PG (ref 26.8–34.3)
MCHC RBC AUTO-ENTMCNC: 33.6 G/DL (ref 31.4–37.4)
MCV RBC AUTO: 92 FL (ref 82–98)
MONOCYTES # BLD AUTO: 0.91 THOUSAND/ΜL (ref 0.17–1.22)
MONOCYTES NFR BLD AUTO: 10 % (ref 4–12)
NEUTROPHILS # BLD AUTO: 6.46 THOUSANDS/ΜL (ref 1.85–7.62)
NEUTS SEG NFR BLD AUTO: 71 % (ref 43–75)
NRBC BLD AUTO-RTO: 0 /100 WBCS
PLATELET # BLD AUTO: 172 THOUSANDS/UL (ref 149–390)
PMV BLD AUTO: 11.1 FL (ref 8.9–12.7)
POTASSIUM SERPL-SCNC: 4.7 MMOL/L (ref 3.5–5.3)
PROCALCITONIN SERPL-MCNC: 15.43 NG/ML
RBC # BLD AUTO: 4.3 MILLION/UL (ref 3.88–5.62)
SODIUM SERPL-SCNC: 135 MMOL/L (ref 136–145)
WBC # BLD AUTO: 9 THOUSAND/UL (ref 4.31–10.16)

## 2018-12-05 PROCEDURE — 85025 COMPLETE CBC W/AUTO DIFF WBC: CPT | Performed by: INTERNAL MEDICINE

## 2018-12-05 PROCEDURE — 99223 1ST HOSP IP/OBS HIGH 75: CPT | Performed by: INTERNAL MEDICINE

## 2018-12-05 PROCEDURE — 93306 TTE W/DOPPLER COMPLETE: CPT | Performed by: INTERNAL MEDICINE

## 2018-12-05 PROCEDURE — 93306 TTE W/DOPPLER COMPLETE: CPT

## 2018-12-05 PROCEDURE — 80048 BASIC METABOLIC PNL TOTAL CA: CPT | Performed by: INTERNAL MEDICINE

## 2018-12-05 PROCEDURE — 99232 SBSQ HOSP IP/OBS MODERATE 35: CPT | Performed by: PHYSICIAN ASSISTANT

## 2018-12-05 PROCEDURE — 84145 PROCALCITONIN (PCT): CPT | Performed by: INTERNAL MEDICINE

## 2018-12-05 RX ORDER — HALOPERIDOL 5 MG/ML
5 INJECTION INTRAMUSCULAR ONCE
Status: COMPLETED | OUTPATIENT
Start: 2018-12-05 | End: 2018-12-05

## 2018-12-05 RX ADMIN — SODIUM CHLORIDE TAB 1 GM 2 G: 1 TAB at 08:29

## 2018-12-05 RX ADMIN — GABAPENTIN 300 MG: 300 CAPSULE ORAL at 20:07

## 2018-12-05 RX ADMIN — QUETIAPINE FUMARATE 25 MG: 25 TABLET ORAL at 20:07

## 2018-12-05 RX ADMIN — CEFAZOLIN SODIUM 1000 MG: 1 SOLUTION INTRAVENOUS at 05:35

## 2018-12-05 RX ADMIN — AMLODIPINE BESYLATE 10 MG: 10 TABLET ORAL at 08:29

## 2018-12-05 RX ADMIN — FINASTERIDE 5 MG: 5 TABLET, FILM COATED ORAL at 08:29

## 2018-12-05 RX ADMIN — HEPARIN SODIUM 5000 UNITS: 5000 INJECTION, SOLUTION INTRAVENOUS; SUBCUTANEOUS at 05:35

## 2018-12-05 RX ADMIN — GABAPENTIN 300 MG: 300 CAPSULE ORAL at 16:22

## 2018-12-05 RX ADMIN — ALPRAZOLAM 0.25 MG: 0.25 TABLET ORAL at 07:49

## 2018-12-05 RX ADMIN — HYDROCODONE BITARTRATE AND ACETAMINOPHEN 1 TABLET: 5; 325 TABLET ORAL at 10:21

## 2018-12-05 RX ADMIN — HALOPERIDOL LACTATE 5 MG: 5 INJECTION, SOLUTION INTRAMUSCULAR at 23:42

## 2018-12-05 RX ADMIN — SENNOSIDES 8.6 MG: 8.6 TABLET, FILM COATED ORAL at 20:07

## 2018-12-05 RX ADMIN — VANCOMYCIN HYDROCHLORIDE 1750 MG: 1 INJECTION, POWDER, LYOPHILIZED, FOR SOLUTION INTRAVENOUS at 18:33

## 2018-12-05 RX ADMIN — CYANOCOBALAMIN TAB 500 MCG 500 MCG: 500 TAB at 08:29

## 2018-12-05 RX ADMIN — HEPARIN SODIUM 5000 UNITS: 5000 INJECTION, SOLUTION INTRAVENOUS; SUBCUTANEOUS at 16:22

## 2018-12-05 RX ADMIN — MELATONIN TAB 3 MG 3 MG: 3 TAB at 20:07

## 2018-12-05 RX ADMIN — GABAPENTIN 300 MG: 300 CAPSULE ORAL at 08:29

## 2018-12-05 RX ADMIN — QUETIAPINE FUMARATE 12.5 MG: 25 TABLET ORAL at 08:29

## 2018-12-05 RX ADMIN — TAMSULOSIN HYDROCHLORIDE 0.4 MG: 0.4 CAPSULE ORAL at 16:22

## 2018-12-05 RX ADMIN — ALPRAZOLAM 0.25 MG: 0.25 TABLET ORAL at 16:22

## 2018-12-05 NOTE — PLAN OF CARE
Problem: DISCHARGE PLANNING - CARE MANAGEMENT  Goal: Discharge to post-acute care or home with appropriate resources  INTERVENTIONS:  - Conduct assessment to determine patient/family and health care team treatment goals, and need for post-acute services based on payer coverage, community resources, and patient preferences, and barriers to discharge  - Address psychosocial, clinical, and financial barriers to discharge as identified in assessment in conjunction with the patient/family and health care team  - Arrange appropriate level of post-acute services according to patients   needs and preference and payer coverage in collaboration with the physician and health care team  - Communicate with and update the patient/family, physician, and health care team regarding progress on the discharge plan  - Arrange appropriate transportation to post-acute venues  Outcome: Progressing  LOS 0, not a bundle, Pt is a readmit  CM met with Pt's daughter and Pt's son in conference room  Pt was at [facility] for [address] and had positive blood and urine cultures along with a fever and was [facility]  Pt's family states Pt was using a walker at the facility and a wheelchair for long distances  Pt has dementia and is confused  Pt's daughter Carmella Moralesd is Pt's POA  Carmellanadia Dangrand would like Pt to return to [facility] for [address]  CM submitted referral  CM provided Pt's daughter with a list of additional facilities for if [facility] cannot accept back  CM dept will follow Pt until discharge

## 2018-12-05 NOTE — UTILIZATION REVIEW
[address]  Utilization Review Department  Phone: [phone]; Fax [phone]  [email or web address]  ATTENTION: Please call with any questions or concerns to [phone]  and carefully listen to the prompts so that you are directed to the right person  Send all requests for admission clinical reviews, approved or denied determinations and any other requests to fax [phone]  All voicemails are confidential   Initial Clinical Review    Admission: Date/Time/Statement: OBSERVATION ADMISSION  12/04/18 1252 CONVERTED TO INPATIENT ADMISSION DUE BACTEREMIA  NEEDING IV ANTIBIOTICS 12/05/2018 1201    12/05/18 1201  Inpatient Admission Once     Transfer Service: General Medicine       Question Answer Comment   Admitting Physician Darron Esteban    Level of Care Med Surg    Estimated length of stay More than 2 Midnights    Certification I certify that inpatient services are medically necessary for this patient for a duration of greater than two midnights  See H&P and MD Progress Notes for additional information about the patient's course of treatment  12/05/18 1201           ED: Date/Time/Mode of Arrival:   ED Arrival Information     Expected Arrival Acuity Means of Arrival Escorted By Service Admission Type    - 12/4/2018 11:13 Urgent Ambulance [facility] [address] [facility] Urgent    Arrival Complaint    FEVER          Chief Complaint:   Chief Complaint   Patient presents with    Fever - 76 years or older     Per [facility] where pt is living, pt has been having fevers and had + blood cultures  Pt presents with no complaints  History of Illness:  80 y o  male with PMHx of urinary retention with chronic Ruelas catheter, dementia, history of throat cancer, hypertension who presents after being sent in by [facility] for fever and positive blood cultures x2    Initially the ER was told that patient only had 1/2 positive blood cultures and fevers  When contacting [facility], both were gram-positive cocci in clusters  Urine from cultured saleh growing Staph & Strep species  Reported fever for 2 day with max=102F from facility  Given 2 doses of Cipro at [facility]  Mental status has been baseline however now he seems more confused  ED Vital Signs:   ED Triage Vitals [12/04/18 1121]   Temperature Pulse Respirations Blood Pressure SpO2   (!) 97 4 °F (36 3 °C) 64 18 125/97 95 %      Temp Source Heart Rate Source Patient Position - Orthostatic VS BP Location FiO2 (%)   Oral Monitor Lying Right arm --      Pain Score       No Pain        Wt Readings from Last 1 Encounters:   12/04/18 93 2 kg (205 lb 7 5 oz)       Vital Signs (abnormal): 12/04/2018 temp: 97 4  12/5 BP: 175/71    Abnormal Labs/Diagnostic Test Results: 12/04/2018  BUN 52     Creatinine 1 81     Alkaline Phosphatase 206     Albumin 2 9     Wbc 11 6, Urinalysis: blood large, leukocytes small, RBC 20-30, WBC innumerable, bacteria moderate;   CXR: Diminished inspiration  Bibasilar atelectasis  Degenerative changes in the bilateral shoulders and thoracic spine   Findings suggestive of chronic bilateral rotator cuff injury    12/05/2018  Sodium 135     BUN 40     Creatinine 1 31     Glucose, Fasting 100         ED Treatment:   Medication Administration from 12/04/2018 1113 to 12/04/2018 1440       Date/Time Order Dose Route Action Comments     12/04/2018 1255 sodium chloride 0 9 % bolus 500 mL 0 mL Intravenous Stopped      12/04/2018 1230 sodium chloride 0 9 % bolus 500 mL 500 mL Intravenous New Bag      12/04/2018 1321 HYDROcodone-acetaminophen (NORCO) 5-325 mg per tablet 1 tablet 1 tablet Oral Given           Past Medical/Surgical History:    Active Ambulatory Problems     Diagnosis Date Noted    Dementia 11/14/2018    Urinary retention 11/14/2018    History of throat cancer 11/14/2018    Acute-on-chronic kidney injury ([facility] [address] ) 11/21/2018       Past Medical History:   Diagnosis Date  Gall stone     Hypotension     Throat [address] ([facility] [address] )     Urinary retention        Admitting Diagnosis: Fever [R50 9]    Age/Sex: 80 y o  male    Assessment/Plan: 80 y o  Male presents with fever for 2 days; urine from Ruelas cultured growing Staph & strep species; blood cultures gram positive cocci in clusters; Fever with Gram positive bacteremia: change med to IV ancef with follow up cultures  Call [facility] for final results; appreciate ID input  LEXIE baseline CREAT 1 2-1 4; continue IV fluids trend BMP; Urinary retention-ongoing issue, [facility] outpatient follow up with Urology, cont Proscar  Dementia-Supportive care, On Seroquel , monitor closely       Admission Orders:  Scheduled Meds:   Current Facility-Administered Medications:  acetaminophen 650 mg Oral Q6H PRN    ALPRAZolam 0 25 mg Oral TID PRN    amLODIPine 10 mg Oral Daily    bisacodyl 10 mg Rectal Daily PRN    cefazolin 1,000 mg Intravenous Q12H Last Rate: 1,000 mg (12/05/18 0535)   cyanocobalamin 500 mcg Oral Daily    finasteride 5 mg Oral Daily    gabapentin 300 mg Oral TID    heparin (porcine) 5,000 Units Subcutaneous Q8H [address]    HYDROcodone-acetaminophen 1 tablet Oral Q6H PRN    melatonin 3 mg Oral HS    QUEtiapine 12 5 mg Oral Daily    QUEtiapine 25 mg Oral HS    senna 1 tablet Oral HS    sodium chloride 100 mL/hr Intravenous Continuous Last Rate: 100 mL/hr (12/04/18 1805)   sodium chloride 2 g Oral Daily    tamsulosin 0 4 mg Oral Daily With Dinner      Continuous Infusions:   sodium chloride 100 mL/hr Last Rate: 100 mL/hr (12/04/18 1805)     PRN Meds:   Peripheral IV  Regular diet  Inpatient to Infectious Disease

## 2018-12-05 NOTE — PROGRESS NOTES
Vancomycin Assessment  Morro Guo is a 80 y o  male who is currently receiving vancomycin for bacteremia    Relevant clinical data and objective history reviewed:  Creatinine   Date Value Ref Range Status   12/05/2018 1 31 (H) 0 60 - 1 30 mg/dL Final     Comment:     Standardized to IDMS reference method   12/04/2018 1 81 (H) 0 60 - 1 30 mg/dL Final     Comment:     Standardized to IDMS reference method   11/21/2018 1 20 0 60 - 1 30 mg/dL Final     Comment:     Standardized to IDMS reference method   02/24/2014 0 92 0 60 - 1 30 mg/dL Final     Comment:     Standardized to IDMS reference method     BP (!) 175/71 (BP Location: Right arm)   Pulse 68   Temp 98 3 °F (36 8 °C) (Oral)   Resp 18   Wt 93 2 kg (205 lb 7 5 oz)   SpO2 92%   BMI 25 68 kg/m²   I/O last 3 completed shifts:  In: -   Out: 2150 [Urine:2150]  Lab Results   Component Value Date/Time    BUN 40 (H) 12/05/2018 06:14 AM    BUN 23 02/24/2014 06:07 AM    WBC 9 00 12/05/2018 06:14 AM    WBC 5 76 02/24/2014 06:07 AM    HGB 13 3 12/05/2018 06:14 AM    HGB 15 3 02/24/2014 06:07 AM    HCT 39 6 12/05/2018 06:14 AM    HCT 45 9 02/24/2014 06:07 AM    MCV 92 12/05/2018 06:14 AM    MCV 90 02/24/2014 06:07 AM     12/05/2018 06:14 AM     (L) 02/24/2014 06:07 AM     Temp Readings from Last 3 Encounters:   12/05/18 98 3 °F (36 8 °C) (Oral)   11/28/18 98 3 °F (36 8 °C) (Oral)   11/16/18 98 1 °F (36 7 °C) (Oral)     Vancomycin Days of Therapy: 1    Assessment/Plan  The patient is currently on vancomycin utilizing scheduled dosing based on actual body weight  Baseline risks associated with therapy include: pre-existing renal impairment, concomitant nephrotoxic medications and advanced age  The patient is currently receiving 1500 mg IV q24h after clinical evaluation the dose will be changed to 1750 mg q24h  [facility] will also follow closely for s/sx of nephrotoxicity, infusion reactions and appropriateness of therapy    BMP and CBC will be ordered per protocol  Plan for trough as patient approaches steady state, prior to the 4th  dose at approximately 1630 on 12/08  Due to infection severity, will target a trough of 15-20    Pharmacy will continue to follow the patients culture results and clinical progress daily      Leigh Ann Vizcaino, Pharmacist

## 2018-12-05 NOTE — ASSESSMENT & PLAN NOTE
· Records reviewed from [facility]  Blood cultures collected 12/3 are both positive for gram-positive cocci in clusters    Was given 2 doses of Cipro at [facility]  · Changed to IV Ancef to cover staph species  · Follow-up cultures here  · Obtain Infectious Disease evaluation-- appreciate their input

## 2018-12-05 NOTE — ASSESSMENT & PLAN NOTE
· Noted to have fever at [facility] past 2 days with Tmax 102  Afebrile overnight  · Patient had urine cx and blood cx taken yesterday  Received fax from [facility] with results  · Urine cx from saleh: >100,000 cfu/mL staph species and >100,000 cfu/mL strep species  · Final UC from [facility] growing both staph and enterococcus   · Blood cx x2: gram + cocci in clusters  · Final BC results from [facility] are still pending  · Was given 2 doses of cipro at SNF  Continue IV ancef for now  F/u repeat cultures     · Consult ID

## 2018-12-05 NOTE — SOCIAL WORK
LOS 0, not a bundle, Pt is a readmit  CM met with Pt's daughter and Pt's son in conference room  Pt was at [facility] for [address] and had positive blood and urine cultures along with a fever and was sent to [facility]  Pt's family states Pt was using a walker at the facility and a wheelchair for long distances  Pt has dementia and is confused  Pt's daughter [facility] is Pt's POA  [facility] would like Pt to return to [facility] for [address]  CM submitted referral  CM provided Pt's daughter with a list of additional facilities for if [facility] cannot accept back  CM dept will follow Pt until discharge

## 2018-12-05 NOTE — CONSULTS
Consultation - Infectious Disease   Cassie Ortiz 80 y o  male MRN: [id number]  Unit/Bed#: -01 Encounter: [id number]    IMPRESSION & RECOMMENDATIONS:   1  Gram-positive bacteremia  Patient had blood cultures drawn at [facility] on 12/03/2018 which preliminarily showed gram-positive cocci in clusters in both sets  Unclear etiology  Possibly secondary to bacteriuria and trauma from patient pulling out his Ruelas  No other clear source appreciated  I reviewed patient's chest x-ray which showed bibasilar atelectasis  He has no respiratory or sinus complaints  [facility] also obtained urine cultures on 12/03/2018 which showed coag negative staphylococcus and enterococcus faecalis  His follow up admission urine culture showed enterococcus  I recommend checking TTE to rule out endocarditis  Fortunately the patient is clinically stable and non toxic  He has been afebrile since his admission and had only mild leukocytosis  Patient had two doses of PO Cipro at [facility]  He began IV cefazolin after his admission and has been tolerating without difficulty  Given the enterococcus urine results and the fact that his blood cultures are not finalized, I recommend changing him to IV vancomycin while we await additional information   -stop IV cefazolin  -start IV vancomycin  -consult pharmacy for guidance in vancomycin dosing  -monitor CBC and BMP  -follow-up original blood culture results from [facility]  -follow-up admission blood cultures  -follow-up admission urine culture  -check TTE   -monitor vitals  -supportive care    2  Bacteriuria  Patient with a chronic Ruelas catheter due to BPH and urinary retention  Patient has dementia and frequently pulls out his catheter  Catheter was most recently pulled out on 12/02/2018 and was replaced    Patient developed a fever of 102 on 12/03/2018 which prompted a urinalysis and culture at [facility] which showed coag negative staph and enterococcus faecalis  Patient now also with Gram-positive bacteremia  Patient likely has bladder colonization given his chronic Ruelas and may have become bacteremic from the trauma of pulling out his Ruelas   -antibiotic as above  -monitor CBC and BMP  -follow-up original urine culture result from [facility]  -follow-up new urine culture  -monitor vitals  -monitor urine output    3  Urinary retention with chronic Ruelas  Patient with BPH and chronic urinary retention  He has dementia and frequently pulls out his catheter  Recent urinalysis showed large blood which I suspect is related to patient pulling out his Ruelas on 12/03/2018  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 related to his Ruelas but may have become bacteremic from the trauma of pulling it out   -continue outpatient follow-up with patient's urologist    4  Acute kidney injury  Likely pre renal given acute infection  -monitor BMP  -dose adjust antibiotics for renal function as needed    5  Advanced dementia  -supportive care    Discussed in detail with Gloria TORREZ PA-C  HISTORY OF PRESENT ILLNESS:  Reason for Consult:  Febrile illness, Gram-positive bacteremia  HPI: Gorge Burkitt is a 80y o  year old male who was brought to [facility] Emergency Department on 12/04/2018 after his assisted living facility found him to be bacteremic  The patient resides at [facility]  He has a chronic Ruelas catheter for urinary retention in the setting of dementia  Patient frequently repeats out his Ruelas catheter and has to be replaced  On 12/03/2018 the patient developed fevers (Tmax = 102) and the medical staff at [facility] sent both urine and blood cultures  They started him on PO Cipro  The facility sent patient to the ED for further assessment when they found gram-positive cocci in clusters in his blood culture results      Upon arrival to the ED the patient's temperature was 97 4° with a heart rate of 64   Patient's white blood cell count was 11 6  His lactic acid was 1 4  Patient's creatinine was 1 81 with a GFR of 32  Patient had urinalysis in the ED which revealed 20-30 RBC, innumerable WBC, moderate bacteria, small leukocytes, positive protein, and large blood  He was taken for a chest x-ray which revealed bibasilar atelectasis  The patient was admitted for additional medical management  Since his admission he was transitioned from the PO Cipro to IV Ancef  Repeat blood cultures and urine culture were sent  Saleh catheter remains intact  Records from [facility] have been received and will be scanned into the system  The urine culture showed >100,000 staph species and >100,000 strep species  Patient's blood cultures from [facility] showed gram-positive cocci in clusters in both sets  [facility] has requested updated cultures to be faxed over today  We have been asked for formal consult for fever and Gram-positive bacteremia  The patient's past medical and surgical history significant for constipation, throat cancer, gallstones, hypertension, urinary retention with chronic Saleh use, dementia, hypertension, anxiety, a KI, BPH, tonsillectomy, and umbilical hernia repair  The patient has no known drug allergies  REVIEW OF SYSTEMS:  Unable to complete ROS as patient has advanced dementia  Patient's wife is at the bedside who reports her  is calmer than he was yesterday  She is frustrated that he needs to keep the saleh in place because he is always trying to pull it out  She feels that if there was a better option for his prostate he wouldn't need it and she could bring her  back home  She feels she can care for his dementia but not his saleh      PAST MEDICAL HISTORY:  Past Medical History:   Diagnosis Date    Gall stone     Hypotension     Throat cancer ([address] )     Urinary retention      Past Surgical History:   Procedure Laterality Date    TONSILLECTOMY FAMILY HISTORY:  Non-contributory    SOCIAL HISTORY:  Social History   History   Alcohol Use No     History   Drug Use No     History   Smoking Status    Never Smoker   Smokeless Tobacco    Never Used     ALLERGIES:  No Known Allergies    MEDICATIONS:  All current active medications have been reviewed  ANTIBIOTICS:  Cefazolin 2  Antibiotics 2    PHYSICAL EXAM:  Temp:  [97 4 °F (36 3 °C)-98 3 °F (36 8 °C)] 98 3 °F (36 8 °C)  HR:  [62-85] 68  Resp:  [18] 18  BP: (124-175)/(57-97) 175/71  SpO2:  [92 %-98 %] 92 %  Temp (24hrs), Av °F (36 7 °C), Min:97 4 °F (36 3 °C), Max:98 3 °F (36 8 °C)  Current: Temperature: 98 3 °F (36 8 °C)    Intake/Output Summary (Last 24 hours) at 18 1104  Last data filed at 18 0700   Gross per 24 hour   Intake                0 ml   Output             2150 ml   Net            -2150 ml     General Appearance:  Appearing elderly, tired, in no acute distress   Head:  Normocephalic, without obvious abnormality, atraumatic   Eyes:  Conjunctiva pink and sclera anicteric, both eyes   Nose: Nares normal, mucosa normal, no drainage   Throat: Oropharynx moist without lesions   Neck: Supple, symmetrical, no adenopathy, no tenderness/mass/nodules   Back:   Symmetric, no curvature, ROM normal, no CVA tenderness   Lungs:   Clear to auscultation bilaterally, respirations unlabored   Chest Wall:  No tenderness or deformity   Heart:  RRR; no murmur, rub or gallop   Abdomen:   Soft, non-tender, non-distended, positive bowel sounds    Genitourinary: Intact Ruelas catheter, urine output is dark red   Extremities: No cyanosis or clubbing, no edema   Skin: No rashes or lesions  No draining wounds noted  Lymph nodes: Cervical, supraclavicular nodes normal   Neurologic: Advanced dementia, moves all four extremities spontaneously     LABS, IMAGING, & OTHER STUDIES:  Lab Results:  I have personally reviewed pertinent labs      Results from last 7 days  Lab Units 18  0614 18  6756 WBC Thousand/uL 9 00 11 60*   HEMOGLOBIN g/dL 13 3 13 4   PLATELETS Thousands/uL 172 173       Results from last 7 days  Lab Units 12/05/18  0614 12/04/18  1130   POTASSIUM mmol/L 4 7 4 6   CHLORIDE mmol/L 104 101   CO2 mmol/L 22 25   BUN mg/dL 40* 52*   CREATININE mg/dL 1 31* 1 81*   EGFR ml/min/1 73sq m 47 32   CALCIUM mg/dL 8 4 8 3   AST U/L  --  30   ALT U/L  --  21   ALK PHOS U/L  --  206*     Imaging Studies:   I have personally reviewed pertinent imaging study reports and images in PACS  XR CHEST 2 VIEWS 12/04/18  FINDINGS:  The heart is enlarged   Atherosclerotic changes in the aorta  Lung volumes are diminished   Bibasilar atelectasis   Stable 12 mm nodule right midlung  Degenerative changes in the bilateral shoulders and thoracic spine   Findings suggestive of chronic bilateral rotator cuff injury  Impression:     Diminished inspiration   Bibasilar atelectasis       Other Studies:   Blood cultures are pending  Urine cultures pending

## 2018-12-05 NOTE — PROGRESS NOTES
Progress Note Nahum Nguyen 8/14/1926, 80 y o  male MRN: [id number]    Unit/Bed#: -01 Encounter: [id number]    Primary Care Provider: Bryanna Barth DO   Date and time admitted to hospital: 12/4/2018 11:14 AM        Fever   Assessment & Plan    · Noted to have fever at [facility] past 2 days with Tmax 102  Afebrile overnight  · Patient had urine cx and blood cx taken yesterday  Received fax from [facility] with results  · Urine cx from saleh: >100,000 cfu/mL staph species and >100,000 cfu/mL strep species  · Final UC from [facility] growing both staph and enterococcus   · Blood cx x2: gram + cocci in clusters  · Final BC results from [facility] are still pending  · Was given 2 doses of cipro at [facility]  Continue IV ancef for now  F/u repeat cultures  · Consult ID     Acute-on-chronic kidney injury ([facility] [address] )   Assessment & Plan    · Baseline creatinine appears to be between 1 2 and 1 4  Today 1 3  · Trend BMP     Urinary retention   Assessment & Plan    · Ongoing issue  Has Saleh catheter  Would maintain and continue outpatient follow-up with Urology  · Maintain Proscar     Dementia   Assessment & Plan    · Supportive care  Redirect and reorient as possible  Patient is on Seroquel 12 5 mg q a m  And 25 mg q p m  With good benefit per family  · Frequently pulls out Saleh catheter, monitor closely      * Gram-positive bacteremia   Assessment & Plan    · [facility] reviewed [facility]  Blood cultures collected 12/3 are both positive for gram-positive cocci in clusters  Was given 2 doses of Cipro at [facility]  · Changed to IV Ancef to cover staph species  · Follow-up cultures here  · Obtain Infectious Disease evaluation-- appreciate their input          VTE Pharmacologic Prophylaxis:   Pharmacologic: Heparin  Mechanical VTE Prophylaxis in Place: Yes    Patient Centered Rounds: I have performed bedside rounds with nursing staff today      Discussions with Specialists or Other Care Team Provider: ESTHER ID    Education and Discussions with Family / Patient: patient's wife at bedside     Time Spent for Care: 30 minutes  More than 50% of total time spent on counseling and coordination of care as described above  Current Length of Stay: 0 day(s)    Current Patient Status: Inpatient   Certification Statement: The patient, admitted on an observation basis, will now require > 2 midnight hospital stay due to bacteremia requiring IV abx    Discharge Plan: d/c back to [facility] when medically stable-- most likely will be here until Friday (potentially through weekend pending clinical course)     Code Status: Level 3 - DNAR and DNI      Subjective:   RN reports patient tugging at saleh this AM, now with hematuria  Did respond well to seroquel  Wife reports less agitation than yesterday  Objective:     Vitals:   Temp (24hrs), Av 1 °F (36 7 °C), Min:98 °F (36 7 °C), Max:98 3 °F (36 8 °C)    Temp:  [98 °F (36 7 °C)-98 3 °F (36 8 °C)] 98 3 °F (36 8 °C)  HR:  [62-85] 68  Resp:  [18] 18  BP: (124-175)/(57-71) 175/71  SpO2:  [92 %-98 %] 92 %  Body mass index is 25 68 kg/m²  Input and Output Summary (last 24 hours): Intake/Output Summary (Last 24 hours) at 18 1210  Last data filed at 18 0700   Gross per 24 hour   Intake                0 ml   Output             2150 ml   Net            -2150 ml       Physical Exam:     Physical Exam   Constitutional: No distress  HENT:   Head: Normocephalic and atraumatic  Mouth/Throat: No oropharyngeal exudate  Eyes: Pupils are equal, round, and reactive to light  Conjunctivae and EOM are normal  No scleral icterus  Neck: No JVD present  Cardiovascular: Normal rate and regular rhythm  Exam reveals no gallop and no friction rub  No murmur heard  Pulmonary/Chest: Effort normal and breath sounds normal  No respiratory distress  He has no wheezes  He has no rales  Abdominal: Soft  Bowel sounds are normal  He exhibits no distension   There is no tenderness  There is no rebound  Neurological: He is alert  He displays normal reflexes  He exhibits normal muscle tone  Alert to self only; difficulty with following commands and difficulty with re-direction   Skin: Skin is warm and dry  No rash noted  He is not diaphoretic  No erythema  Psychiatric: He has a normal mood and affect  His behavior is normal        Additional Data:     Labs:      Results from last 7 days  Lab Units 12/05/18  0614   WBC Thousand/uL 9 00   HEMOGLOBIN g/dL 13 3   HEMATOCRIT % 39 6   PLATELETS Thousands/uL 172   NEUTROS PCT % 71   LYMPHS PCT % 16   MONOS PCT % 10   EOS PCT % 1       Results from last 7 days  Lab Units 12/05/18  0614 12/04/18  1130   SODIUM mmol/L 135* 136   POTASSIUM mmol/L 4 7 4 6   CHLORIDE mmol/L 104 101   CO2 mmol/L 22 25   BUN mg/dL 40* 52*   CREATININE mg/dL 1 31* 1 81*   ANION GAP mmol/L 9 10   CALCIUM mg/dL 8 4 8 3   ALBUMIN g/dL  --  2 9*   TOTAL BILIRUBIN mg/dL  --  0 60   ALK PHOS U/L  --  206*   ALT U/L  --  21   AST U/L  --  30   GLUCOSE RANDOM mg/dL 100 109                   Results from last 7 days  Lab Units 12/05/18  0614 12/04/18  1130   LACTIC ACID mmol/L  --  1 4   PROCALCITONIN ng/ml 15 43*  --            * I Have Reviewed All Lab Data Listed Above  * Additional Pertinent Lab Tests Reviewed:  [facility]    Imaging:    Imaging Reports Reviewed Today Include: CXR  Imaging Personally Reviewed by Myself Includes:  CXR    Recent Cultures (last 7 days):           Last 24 Hours Medication List:     Current Facility-Administered Medications:  acetaminophen 650 mg Oral Q6H PRN Edgard Lake MD    ALPRAZolam 0 25 mg Oral TID PRN Edgard Lake MD    amLODIPine 10 mg Oral Daily Edgard Lake MD    bisacodyl 10 mg Rectal Daily PRN Edgard Lake MD    cefazolin 1,000 mg Intravenous Q12H Gia Vargas PA-C Last Rate: 1,000 mg (12/05/18 0535)   cyanocobalamin 500 mcg Oral Daily Jabari Fu MD    finasteride 5 mg Oral Daily Jabari Fu MD    gabapentin 300 mg Oral TID Kath Beltrán MD    heparin (porcine) 5,000 Units Subcutaneous Q8H [address] Gia Vargas PA-C    HYDROcodone-acetaminophen 1 tablet Oral Q6H PRN Kath Beltrán MD    melatonin 3 mg Oral HS Kath Beltrán MD    QUEtiapine 12 5 mg Oral Daily Kath Beltrán MD    QUEtiapine 25 mg Oral HS Gia Vargas PA-C    senna 1 tablet Oral HS Kath Beltrán MD    sodium chloride 100 mL/hr Intravenous Continuous Darryle Rod, PA-C Last Rate: 100 mL/hr (12/04/18 1805)   sodium chloride 2 g Oral Daily Kath Beltrán MD    tamsulosin 0 4 mg Oral Daily With Ajay Fleming MD         Today, Patient Was Seen By: Boston hCapman PA-C    ** Please Note: Dictation voice to text software may have been used in the creation of this document   **

## 2018-12-06 PROBLEM — N18.9 ACUTE-ON-CHRONIC KIDNEY INJURY (HCC): Status: RESOLVED | Noted: 2018-11-21 | Resolved: 2018-12-06

## 2018-12-06 PROBLEM — N17.9 ACUTE-ON-CHRONIC KIDNEY INJURY (HCC): Status: RESOLVED | Noted: 2018-11-21 | Resolved: 2018-12-06

## 2018-12-06 LAB
ANION GAP SERPL CALCULATED.3IONS-SCNC: 11 MMOL/L (ref 4–13)
BACTERIA UR CULT: ABNORMAL
BACTERIA UR CULT: ABNORMAL
BUN SERPL-MCNC: 31 MG/DL (ref 5–25)
CALCIUM SERPL-MCNC: 8.3 MG/DL (ref 8.3–10.1)
CHLORIDE SERPL-SCNC: 104 MMOL/L (ref 100–108)
CO2 SERPL-SCNC: 23 MMOL/L (ref 21–32)
CREAT SERPL-MCNC: 1.29 MG/DL (ref 0.6–1.3)
ERYTHROCYTE [DISTWIDTH] IN BLOOD BY AUTOMATED COUNT: 12.2 % (ref 11.6–15.1)
GFR SERPL CREATININE-BSD FRML MDRD: 48 ML/MIN/1.73SQ M
GLUCOSE SERPL-MCNC: 104 MG/DL (ref 65–140)
HCT VFR BLD AUTO: 39.5 % (ref 36.5–49.3)
HGB BLD-MCNC: 13.4 G/DL (ref 12–17)
MCH RBC QN AUTO: 30.8 PG (ref 26.8–34.3)
MCHC RBC AUTO-ENTMCNC: 33.9 G/DL (ref 31.4–37.4)
MCV RBC AUTO: 91 FL (ref 82–98)
PLATELET # BLD AUTO: 191 THOUSANDS/UL (ref 149–390)
PMV BLD AUTO: 10.7 FL (ref 8.9–12.7)
POTASSIUM SERPL-SCNC: 4.4 MMOL/L (ref 3.5–5.3)
PROCALCITONIN SERPL-MCNC: 7.18 NG/ML
RBC # BLD AUTO: 4.35 MILLION/UL (ref 3.88–5.62)
SODIUM SERPL-SCNC: 138 MMOL/L (ref 136–145)
WBC # BLD AUTO: 8.8 THOUSAND/UL (ref 4.31–10.16)

## 2018-12-06 PROCEDURE — 84145 PROCALCITONIN (PCT): CPT | Performed by: PHYSICIAN ASSISTANT

## 2018-12-06 PROCEDURE — G8987 SELF CARE CURRENT STATUS: HCPCS

## 2018-12-06 PROCEDURE — 97167 OT EVAL HIGH COMPLEX 60 MIN: CPT

## 2018-12-06 PROCEDURE — 97163 PT EVAL HIGH COMPLEX 45 MIN: CPT

## 2018-12-06 PROCEDURE — G8979 MOBILITY GOAL STATUS: HCPCS

## 2018-12-06 PROCEDURE — 99232 SBSQ HOSP IP/OBS MODERATE 35: CPT | Performed by: PHYSICIAN ASSISTANT

## 2018-12-06 PROCEDURE — 85027 COMPLETE CBC AUTOMATED: CPT | Performed by: PHYSICIAN ASSISTANT

## 2018-12-06 PROCEDURE — G8978 MOBILITY CURRENT STATUS: HCPCS

## 2018-12-06 PROCEDURE — 97110 THERAPEUTIC EXERCISES: CPT

## 2018-12-06 PROCEDURE — 80048 BASIC METABOLIC PNL TOTAL CA: CPT | Performed by: PHYSICIAN ASSISTANT

## 2018-12-06 PROCEDURE — G8988 SELF CARE GOAL STATUS: HCPCS

## 2018-12-06 PROCEDURE — 99232 SBSQ HOSP IP/OBS MODERATE 35: CPT | Performed by: INTERNAL MEDICINE

## 2018-12-06 RX ORDER — ECHINACEA PURPUREA EXTRACT 125 MG
1 TABLET ORAL AS NEEDED
Status: DISCONTINUED | OUTPATIENT
Start: 2018-12-06 | End: 2018-12-07 | Stop reason: HOSPADM

## 2018-12-06 RX ADMIN — ALPRAZOLAM 0.25 MG: 0.25 TABLET ORAL at 16:12

## 2018-12-06 RX ADMIN — AMLODIPINE BESYLATE 10 MG: 10 TABLET ORAL at 08:34

## 2018-12-06 RX ADMIN — FINASTERIDE 5 MG: 5 TABLET, FILM COATED ORAL at 08:34

## 2018-12-06 RX ADMIN — HYDROCODONE BITARTRATE AND ACETAMINOPHEN 1 TABLET: 5; 325 TABLET ORAL at 12:40

## 2018-12-06 RX ADMIN — MELATONIN TAB 3 MG 3 MG: 3 TAB at 21:03

## 2018-12-06 RX ADMIN — ACETAMINOPHEN 650 MG: 325 TABLET, FILM COATED ORAL at 14:21

## 2018-12-06 RX ADMIN — GABAPENTIN 300 MG: 300 CAPSULE ORAL at 21:03

## 2018-12-06 RX ADMIN — VANCOMYCIN HYDROCHLORIDE 1750 MG: 1 INJECTION, POWDER, LYOPHILIZED, FOR SOLUTION INTRAVENOUS at 16:12

## 2018-12-06 RX ADMIN — Medication 1 SPRAY: at 21:07

## 2018-12-06 RX ADMIN — ALPRAZOLAM 0.25 MG: 0.25 TABLET ORAL at 20:20

## 2018-12-06 RX ADMIN — QUETIAPINE FUMARATE 12.5 MG: 25 TABLET ORAL at 08:34

## 2018-12-06 RX ADMIN — SENNOSIDES 8.6 MG: 8.6 TABLET, FILM COATED ORAL at 21:03

## 2018-12-06 RX ADMIN — GABAPENTIN 300 MG: 300 CAPSULE ORAL at 08:34

## 2018-12-06 RX ADMIN — TAMSULOSIN HYDROCHLORIDE 0.4 MG: 0.4 CAPSULE ORAL at 16:06

## 2018-12-06 RX ADMIN — QUETIAPINE FUMARATE 25 MG: 25 TABLET ORAL at 21:03

## 2018-12-06 RX ADMIN — HEPARIN SODIUM 5000 UNITS: 5000 INJECTION, SOLUTION INTRAVENOUS; SUBCUTANEOUS at 14:21

## 2018-12-06 RX ADMIN — CYANOCOBALAMIN TAB 500 MCG 500 MCG: 500 TAB at 08:34

## 2018-12-06 RX ADMIN — SODIUM CHLORIDE TAB 1 GM 2 G: 1 TAB at 08:34

## 2018-12-06 RX ADMIN — GABAPENTIN 300 MG: 300 CAPSULE ORAL at 16:06

## 2018-12-06 NOTE — ASSESSMENT & PLAN NOTE
· Noted to have fever at [facility] past 2 days with Tmax 102  Afebrile x48 hours  · Patient had urine cx and blood cx taken yesterday  Received fax from [facility] with results  · Urine cx from saleh: >100,000 cfu/mL staph species and >100,000 cfu/mL strep species    · Final UC from [facility] growing both staph and enterococcus   · Blood cx x2: gram + cocci in clusters  · Final BC results from [facility] are still pending-- contacting [facility] for final urine cultures   · Discussed with ID-- BC drawn here negative @ 24 hours; UC here + for enterococcus-- concern for contaminant vs  True bacteremia-- currently on vanco-- awaiting final blood cultures from [facility] for [employer] abx plan

## 2018-12-06 NOTE — ASSESSMENT & PLAN NOTE
· Supportive care  Redirect and reorient as possible  Patient is on Seroquel 12 5 mg q a m  And 25 mg q p m  With good benefit per family    · Did require haldol last night for agitation  · Frequently pulls out Ruelas catheter, monitor closely normal...

## 2018-12-06 NOTE — PLAN OF CARE
Problem: DISCHARGE PLANNING - CARE MANAGEMENT  Goal: Discharge to post-acute care or home with appropriate resources  INTERVENTIONS:  - Conduct assessment to determine patient/family and health care team treatment goals, and need for post-acute services based on payer coverage, community resources, and patient preferences, and barriers to discharge  - Address psychosocial, clinical, and financial barriers to discharge as identified in assessment in conjunction with the patient/family and health care team  - Arrange appropriate level of post-acute services according to patient's   needs and preference and payer coverage in collaboration with the physician and health care team  - Communicate with and update the patient/family, physician, and health care team regarding progress on the discharge plan  - Arrange appropriate transportation to post-acute venues   Outcome: Progressing  CM met with Pt's daughter at bedside who reports she spoke to Pt's PCP Dr Tayo Vivar who recommends [facility] for [address] [place]  Pt's daughter would like a referral placed  CM placed referral  Pt will need insurance auth prior to STR

## 2018-12-06 NOTE — PLAN OF CARE
Problem: DISCHARGE PLANNING - CARE MANAGEMENT  Goal: Discharge to post-acute care or home with appropriate resources  INTERVENTIONS:  - Conduct assessment to determine patient/family and health care team treatment goals, and need for post-acute services based on payer coverage, community resources, and patient preferences, and barriers to discharge  - Address psychosocial, clinical, and financial barriers to discharge as identified in assessment in conjunction with the patient/family and health care team  - Arrange appropriate level of post-acute services according to patient's   needs and preference and payer coverage in collaboration with the physician and health care team  - Communicate with and update the patient/family, physician, and health care team regarding progress on the discharge plan  - Arrange appropriate transportation to post-acute venues   Outcome: Progressing  CM spoke with Keli Hilton at [facility], [employer] is not in network and Pt would have a $10,000 deductible and then SNF would be covered at 60%  CM made Pt's daughter aware  Pt's daughter would like a referral to [facility]  CM submitted referral and spoke to abram [facility], they are able to accept Pt at discharge  CM made Pt's daughter aware [facility] can accept  CM faxed clinicals to Héctor Hager at [phone] for authorization for STR and BLS      CM dept will follow

## 2018-12-06 NOTE — SOCIAL WORK
CM met with Pt's daughter at bedside who reports she spoke to Pt's PCP Dr Cara Landry who recommends [facility] for [address] [place]  Pt's daughter would like a referral placed  CM placed referral  Pt will need insurance auth prior to STR

## 2018-12-06 NOTE — OCCUPATIONAL THERAPY NOTE
[address]  Evaluation     Patient Name: Latricia Espinoza  [id number] Date: 12/6/2018  Problem List  Patient Active Problem List   Diagnosis    Dementia    Urinary retention    History of throat cancer    Fever    Gram-positive bacteremia     Past Medical History  Past Medical History:   Diagnosis Date    Gall stone     Hypotension     Throat cancer ([address] )     Urinary retention      Past Surgical History  Past Surgical History:   Procedure Laterality Date    TONSILLECTOMY           12/06/18 1314   Note Type   Note type Eval only   Restrictions/Precautions   Weight Bearing Precautions Per Order No   Other Precautions Impulsive;Cognitive; Chair Alarm; Bed Alarm; Fall Risk;Pain;Multiple lines   Pain Assessment   Pain Assessment FLACC   Pain Score No Pain   Pain Location Generalized   Pain Rating: FLACC (Rest) - Face 0   Pain Rating: FLACC (Rest) - Legs 0   Pain Rating: FLACC (Rest) - Activity 0   Pain Rating: FLACC (Rest) - Cry 0   Pain Rating: FLACC (Rest) - Consolability 0   Score: FLACC (Rest) 0   Pain Rating: FLACC (Activity) - Face 1   Pain Rating: FLACC (Activity) - Legs 1   Pain Rating: FLACC (Activity) - Activity 1   Pain Rating: FLACC (Activity) - Cry 1   Pain Rating: FLACC (Activity) - Consolability 1   Score: FLACC (Activity) 5   Home Living   Type of Home House   Home Layout Two level  (0 ALEXIA from porch)   Bathroom Toilet Standard   Bathroom Equipment Grab bars in shower;Built-in shower seat   Bathroom Accessibility Accessible via walker   [address] [place] [address],[address]   Additional Comments Pt admit from rehab at [facility]  At baseline, pt lives w/ signficant other in [facility] w/ 0 [address]LEXIA from porch      Prior Function   Level of [place] Needs assistance with IADLs   Lives With Significant other   Receives Help From Family   ADL Assistance Needs assistance   IADLs Needs assistance   Vocational Retired   Comments Pt not accurate historian upon eval, and social history obtained from chart and pt's daughter present  Pt uses walker for functional mobiity and needs assist w/ ADL/ IADL   Lifestyle   Autonomy Pt not accurate historian upon eval and at baseline  Pt needs assistance w/ ADL / IADL and uses RW   Reciprocal Relationships Supportive daughter present at bedside provide majority of social history   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   [address] Pt unable to report upon eval  Pt's daughter reports that pt is figety and had begun to enjoy block puzzles at [facility]  ADL   Eating Assistance 6  Modified independent  (seated in bedside recliner chair using tray table)   Eating Deficit Setup   Grooming Assistance 4  Minimal Assistance   Grooming Deficit Setup;Verbal cueing; Impulsive;Supervision/safety; Increased time to complete;Standing with assistive device   UB Bathing Assistance Unable to assess   LB Bathing Assistance Unable to assess   UB Dressing Assistance 4  Minimal Assistance  (standing to don robe)   UB Dressing Deficit Setup;Supervision/safety; Increased time to complete   LB Dressing Assistance 4  Minimal Assistance  (to adjust / pull up socks while seated in recliner chair)   LB Dressing Deficit Setup;Verbal cueing; Impulsive;Supervision/safety; Increased time to complete   Bed Mobility   Supine to Sit 4  Minimal assistance   Additional items Assist x 1;Verbal cues;LE management; Increased time required; Bedrails   Sit to Supine Unable to assess   Additional Comments Pt seated OOB in chair post eval w/ call bell in reach, chair alarm activated, needs met   Transfers   Sit to Stand 3  Moderate assistance   Additional items Assist x 1; Armrests; Increased time required;Verbal cues   Stand to Sit 4  Minimal assistance   Additional items Assist x 1; Armrests; Increased time required;Verbal cues  (A, cues to control decent)   Stand pivot 4  Minimal assistance   Additional items Assist x 1; Increased time required;Verbal cues  (using RW)   Additional Comments pt declined additional functional mobiltiy due to dizzy, and lunch arrived   Balance   Static Sitting Fair +   Dynamic Sitting Fair -   Static Standing Poor +   Ambulatory Poor +   Activity Tolerance   Activity Tolerance Patient limited by fatigue;Patient limited by pain; Other (Comment)  (decreased cognition)   Medical Staff Made Aware spoke to [facility], [facility] spoke to RN, Bonnie Aldrich   RUE Assessment   RUE Assessment X  (able to complete ADL w/ ROM)   RUE Strength   RUE Overall Strength Due to cognitive deficits; Other (Comment)  (able to complete ADL, not formally assessed)   LUE Assessment   LUE Assessment (able to complete w/ ROM)   LUE Strength   LUE Overall Strength Due to cognitive deficits; Unable to assess; Other (Comment)  (not formally assessed, able to complete ADL)   Hand Function   Gross Motor Coordination Functional   Fine Motor Coordination (able to feed himself)   Sensation   Light Touch Not tested  (responed to light touch from therapist to arouse him)   Sharp/Dull Not tested   Vision-Basic Assessment   Current Vision Wears glasses only for reading   Cognition   Overall Cognitive Status Impaired   Arousal/Participation Arousable; Cooperative; Other (Comment)  (impulsive, fidgety)   Attention Attends with cues to redirect   Orientation Level Oriented to person; Other (Comment); Oriented to place  (able to report birthdate, and in hospital)   Memory Decreased recall of precautions;Decreased recall of recent events;Decreased short term memory   Following Commands Follows one step commands with increased time or repetition   Comments Identified pt by full name and birthdate  Pt benefits from cues to consistently follow directions during ADL performance  Able to follow one step functional directions and participate in conversation about work history (w/ increased time, and min cues/ prompts)  Cooperative, but impuslive and fidgety   Assessment   Limitation Decreased ADL status; Decreased UE strength;Decreased cognition;Decreased Safe judgement during ADL;Decreased self-care trans;Decreased high-level ADLs; Decreased endurance   Assessment Pt is a 81yo male admitted to [address] [place] [address] on 12/4/2018  Pt presents w/ gram-positive bacteremia and signficant PMH impacting his occupational performance including dementia  Pt recently discharged from acute care to [facility] for rehab, and admit from there  At baseline, pt lives w/ his signifcant other in [facility] w/ 0 [address]LEXIA from porch  Pt needs assistance w/ IADL and ADL using RW at baseline  Upon evaluation, pt completed bed mobiltiy supine to sit at EOB w/ min A and increased time  Pt completed sit to stand from EOB w/ mod A and min A to sit w/ cues and assist for hand placement and controlled decent  Pt completed UBD w/ min A after set- up  Pt completed functional transfers w/ min A using RW and able to feed himself w/ mod I after set- up  Pt pleasant, cooperative, but confused and benefits from cues to consistently follow directions  Unable to participate in conversation to discuss recent events, but able to discuss work history and identify his daughter present  Pt presents w/ decreased activity tolerance, decreased standing tolerance, decreased cognition, generalized weakness / deconditioning, increased pain impacting his I w/ dressing, bathing, functional mobility, functional transfers, activity engagement, clothing mgmt  Pt would benefit from OT while in acute care to address deficits  From an OT perspective, pt would benefit from post acute rehab when medically stable for discharge from acute care  Will continue to follow   Goals   Patient Goals Pt did not stated upon eval    Plan   Treatment Interventions ADL retraining;Functional transfer training;UE strengthening/ROM; Endurance training;Patient/family training;Equipment evaluation/education; Compensatory technique education;Continued evaluation; Activityengagement   Goal Expiration Date 12/13/18   OT Frequency 1-2x/wk   Recommendation   OT Discharge Recommendation Other (Comment)  (to post acute rehab)   Equipment Recommended Other (comment)  (will continue to assess)   OT - OK to Discharge (to post acute rehab)   Barthel Index   Feeding 10   Bathing 0   Grooming Score 0   Dressing Score 5   Bladder Score 0   Bowels Score 10   Toilet Use Score 5   Transfers (Bed/Chair) Score 10   Mobility (Level Surface) Score 0   Stairs Score 0   Barthel Index Score 40   Modified Pinal Scale   Modified Pinal Scale 4   Pt goals to be met in 7 days:  1  Pt will complete bed mobility supine <> sit w/ mod I to max I w/ ADL performance and improve activity engagement  2  Pt will complete UBD/LBD w/ S after set- up  3  Pt will demonstrate increased functional standing tolerance for at least 10 minutes using AD while engaged in ADL's to max I w/ functional mobility  4  Pt will complete functional transfers to bed, chair, and toilet using AD w/ S to max I w/ ADL performance  5  Pt will consistently follow two step directions w/ good recall during ADL performance  6  Pt will demonstrate good attention and participation in continued evaluation to assess for DME needs and leisure activity exploration to assist in safe discharge planning, and improve activity engagement  7   Pt will demonstrate increased attention to actively participate in preferred leisure activity for at least 15 minutes w/ good attention to max I and safety w/ ADL performance  Maria Eugenia Flores, OTR/L

## 2018-12-06 NOTE — SOCIAL WORK
CM spoke with Xiao Boateng at [facility], [employer] is not in network and Pt would have a $10,000 deductible and then SNF would be covered at 60%  CM made Pt's daughter aware  Pt's daughter would like a referral to [facility]  CM submitted referral and spoke to liaison Charity Layton, they are able to accept Pt at discharge  CM made Pt's daughter aware [facility] can accept  CM faxed clinicals to Leo Gupta at [phone] for authorization for STR and BLS      CM dept will follow

## 2018-12-06 NOTE — PROGRESS NOTES
Vancomycin IV Pharmacy-to-Dose Consultation    Venkatesh Fajardo is a 80 y o  male who is currently receiving Vancomycin IV with management by the Pharmacy Consult service  Assessment/Plan:  The patient was reviewed  Renal function is stable and no signs or symptoms of nephrotoxicity and/or infusion reactions were documented in the chart  Based on todays assessment, continue current vancomycin (day # 2) dosing of 20 mg/kg/dose (1750 mg) Q24H with a plan for trough to be drawn at 1630 on 12/8/18  Patient's culture results show Enterococcus faecalis susceptible to ampicillin  Awaiting cultures from [facility] and ID input to determine de-escalation of therapy  Will continue to monitor patient and follow up with ID for luna Early, JohannD

## 2018-12-06 NOTE — PLAN OF CARE
Problem: OCCUPATIONAL THERAPY ADULT  Goal: Performs self-care activities at highest level of function for planned discharge setting  See evaluation for individualized goals  Treatment Interventions: ADL retraining, Functional transfer training, UE strengthening/ROM, Endurance training, Patient/family training, Equipment evaluation/education, Compensatory technique education, Continued evaluation, Activityengagement  Equipment Recommended: Other (comment) (will continue to assess)       See flowsheet documentation for full assessment, interventions and recommendations  Limitation: Decreased ADL status, Decreased UE strength, Decreased cognition, Decreased Safe judgement during ADL, Decreased self-care trans, Decreased high-level ADLs, Decreased endurance     Assessment: Pt is a 79yo male [facility]/4[facility]2018  Pt presents w/ gram-positive bacteremia and signficant PMH impacting his occupational performance including dementia  Pt recently discharged from acute care to [facility] for rehab, and admit from there  At baseline, pt lives w/ his signifcant other in [facility] w/ 0 [address]LEXIA from porch  Pt needs assistance w/ IADL and ADL using RW at baseline  Upon evaluation, pt completed bed mobiltiy supine to sit at EOB w/ min A and increased time  Pt completed sit to stand from EOB w/ mod A and min A to sit w/ cues and assist for hand placement and controlled decent  Pt completed UBD w/ min A after set- up  Pt completed functional transfers w/ min A using RW and able to feed himself w/ mod I after set- up  Pt pleasant, cooperative, but confused and benefits from cues to consistently follow directions  Unable to participate in conversation to discuss recent events, but able to discuss work history and identify his daughter present   Pt presents w/ decreased activity tolerance, decreased standing tolerance, decreased cognition, generalized weakness / deconditioning, increased pain impacting his I w/ dressing, bathing, functional mobility, functional transfers, activity engagement, clothing mgmt  Pt would benefit from OT while in acute care to address deficits  From an OT perspective, pt would benefit from post acute rehab when medically stable for discharge from acute care   Will continue to follow     OT Discharge Recommendation: Other (Comment) (to post acute rehab)  OT - OK to Discharge:  (to post acute rehab)

## 2018-12-06 NOTE — PROGRESS NOTES
Progress Note Lawyer Sinha 8/14/1926, 80 y o  male MRN: [id number]    Unit/Bed#: -01 Encounter: [id number]    Primary Care Provider: Fish Batista DO   Date and time admitted to hospital: 12/4/2018 11:14 AM    Fever   Assessment & Plan    · Noted to have fever at [facility] past 2 days with Tmax 102  Afebrile x48 hours  · Patient had urine cx and blood cx taken yesterday  Received fax from [facility] with results  · Urine cx from saleh: >100,000 cfu/mL staph species and >100,000 cfu/mL strep species  · Final UC from [facility] growing both staph and enterococcus   · Blood cx x2: gram + cocci in clusters  · Final BC results from [facility] are still pending-- contacting [facility] for final urine cultures   · Discussed with ID-- BC drawn here negative @ 24 hours; UC here + for enterococcus-- concern for contaminant vs  True bacteremia-- currently on vanco-- awaiting final blood cultures from [facility] for definitve abx plan       Urinary retention   Assessment & Plan    · Ongoing issue  Has Saleh catheter  Would maintain and continue outpatient follow-up with Urology  · Maintain Proscar     Dementia   Assessment & Plan    · Supportive care  Redirect and reorient as possible  Patient is on Seroquel 12 5 mg q a m  And 25 mg q p m  With good benefit per family  · Did require haldol last night for agitation  · Frequently pulls out Saleh catheter, monitor closely      * Gram-positive bacteremia   Assessment & Plan    · Records reviewed from [facility]  Blood cultures collected 12/3 are both positive for gram-positive cocci in clusters-- staph species/enterococcus  Was given 2 doses of Cipro at [facility]  · Changed to IV vanco per ID  · Follow-up cultures here-- so far repeat cx negative @ 24 hours  · ID following-- appreciate their input      Acute-on-chronic kidney injury (HCC)-resolved as of 12/6/2018   Assessment & Plan    · Resolved   · Baseline creatinine appears to be between 1 2 and 1 4  Today 1 3  · Trend BMP       VTE Pharmacologic Prophylaxis:   Pharmacologic: Heparin  Mechanical VTE Prophylaxis in Place: Yes    Patient Centered Rounds: I have performed bedside rounds with nursing staff today  Discussions with Specialists or Other Care Team Provider: YESSICA SANTANA    Education and Discussions with Family / Patient: daughter via phone     Time Spent for Care: 30 minutes  More than 50% of total time spent on counseling and coordination of care as described above  Current Length of Stay: 1 day(s)    Current Patient Status: Inpatient   Certification Statement: The patient will continue to require additional inpatient hospital stay due to placement    Discharge Plan: d/c back to [facility]-- ? [address]-- possible d/c tomorrow     Code Status: Level 3 - DNAR and DNI      Subjective:   Patient is sleeping  RN reports increased agitation and pulling at saleh overnight-- required haldol  Objective:     Vitals:   Temp (24hrs), Av 9 °F (36 6 °C), Min:97 5 °F (36 4 °C), Max:98 1 °F (36 7 °C)    Temp:  [97 5 °F (36 4 °C)-98 1 °F (36 7 °C)] 97 5 °F (36 4 °C)  HR:  [65-79] 79  Resp:  [18] 18  BP: (118-140)/(58-65) 118/58  SpO2:  [95 %-96 %] 96 %  Body mass index is 25 68 kg/m²  Input and Output Summary (last 24 hours): Intake/Output Summary (Last 24 hours) at 18 1205  Last data filed at 18 2229   Gross per 24 hour   Intake              480 ml   Output             1150 ml   Net             -670 ml       Physical Exam:     Physical Exam   Constitutional: No distress  HENT:   Head: Normocephalic and atraumatic  Eyes: No scleral icterus  Neck: No JVD present  Cardiovascular: Normal rate and regular rhythm  Exam reveals no gallop and no friction rub  No murmur heard  Pulmonary/Chest: Effort normal and breath sounds normal  No respiratory distress  He has no wheezes  He has no rales  Abdominal: Soft  Bowel sounds are normal  He exhibits no distension   There is no tenderness  There is no rebound  Genitourinary:   Genitourinary Comments: Ruelas catheter draining bloody urine   Musculoskeletal: He exhibits no edema or tenderness  Neurological:   sleeping   Skin: Skin is warm and dry  No rash noted  No erythema  Additional Data:     Labs:      Results from last 7 days  Lab Units 12/06/18  0818 12/05/18  0614   WBC Thousand/uL 8 80 9 00   HEMOGLOBIN g/dL 13 4 13 3   HEMATOCRIT % 39 5 39 6   PLATELETS Thousands/uL 191 172   NEUTROS PCT %  --  71   LYMPHS PCT %  --  16   MONOS PCT %  --  10   EOS PCT %  --  1       Results from last 7 days  Lab Units 12/06/18  0818  12/04/18  1130   SODIUM mmol/L 138  < > 136   POTASSIUM mmol/L 4 4  < > 4 6   CHLORIDE mmol/L 104  < > 101   CO2 mmol/L 23  < > 25   BUN mg/dL 31*  < > 52*   CREATININE mg/dL 1 29  < > 1 81*   ANION GAP mmol/L 11  < > 10   CALCIUM mg/dL 8 3  < > 8 3   ALBUMIN g/dL  --   --  2 9*   TOTAL BILIRUBIN mg/dL  --   --  0 60   ALK PHOS U/L  --   --  206*   ALT U/L  --   --  21   AST U/L  --   --  30   GLUCOSE RANDOM mg/dL 104  < > 109   < > = values in this interval not displayed  Results from last 7 days  Lab Units 12/06/18  0818 12/05/18  0614 12/04/18  1130   LACTIC ACID mmol/L  --   --  1 4   PROCALCITONIN ng/ml 7 18* 15 43*  --            * I Have Reviewed All Lab Data Listed Above  * Additional Pertinent Lab Tests Reviewed: Peter 66 Admission Reviewed    Imaging:    Imaging Reports Reviewed Today Include: CXR  Imaging Personally Reviewed by Myself Includes:  CXR    Recent Cultures (last 7 days):       Results from last 7 days  Lab Units 12/04/18  1254 12/04/18  1153 12/04/18  1130   BLOOD CULTURE   --  No Growth at 24 hrs  No Growth at 24 hrs     URINE CULTURE  20,000-29,000 cfu/ml Enterococcus faecalis*  20,000-29,000 cfu/ml Diphtheroids  --   --        Last 24 Hours Medication List:     Current Facility-Administered Medications:  acetaminophen 650 mg Oral Q6H PRN Angelica Isidro MD Nickie    ALPRAZolam 0 25 mg Oral TID PRN Elliott Schneider MD    amLODIPine 10 mg Oral Daily Elliott Schneider MD    bisacodyl 10 mg Rectal Daily PRN Elliott Schneider MD    cyanocobalamin 500 mcg Oral Daily Elliott Schneider MD    finasteride 5 mg Oral Daily Elliott Schneider MD    gabapentin 300 mg Oral TID Elliott Schneider MD    heparin (porcine) 5,000 Units Subcutaneous Q8H [address] Gia Vargas PA-C    HYDROcodone-acetaminophen 1 tablet Oral Q6H PRN Elliott Schneider MD    melatonin 3 mg Oral HS Elliott Schneider MD    QUEtiapine 12 5 mg Oral Daily Elliott Schneider MD    QUEtiapine 25 mg Oral HS Gia Vargas PA-C    senna 1 tablet Oral HS Sant MD Nickie    sodium chloride 100 mL/hr Intravenous Continuous Rusty Neumann PA-C Last Rate: 100 mL/hr (12/04/18 1805)   sodium chloride 2 g Oral Daily Elliott Schneider MD    tamsulosin 0 4 mg Oral Daily With Valerie Orona MD    vancomycin 20 mg/kg Intravenous Q24H LONI Arcos Last Rate: 1,750 mg (12/05/18 1833)        Today, Patient Was Seen By: Maria Rubio PA-C    ** Please Note: Dictation voice to text software may have been used in the creation of this document   **

## 2018-12-06 NOTE — PHYSICAL THERAPY NOTE
PHYSICAL THERAPY EVALUATION  NAME: Adriana Moore  AGE:   80 y o  MRN:  [id number]  ADMIT DX: Fever [R50 [id number]]    PMH:   Past Medical History:   Diagnosis Date    Gall stone     Hypotension     Throat cancer ([facility] [address] )     Urinary retention      LENGTH OF STAY: 18 0801   Pain Assessment   Pain Assessment No/denies pain   Pain Score No Pain   Home Living   Type of Home House   Home Layout Two level; Able to live on main level with bedroom/bathroom; Ramped entrance   [address] [facility] [facility]   Additional Comments Pt admitted from [facility] Needs assistance   IADLs Needs assistance   Comments Most information obtained from chart as pt is a poor historian  Restrictions/Precautions   Weight Bearing Precautions Per Order No   Other Precautions Impulsive;Cognitive; Chair Alarm; Bed Alarm; Fall Risk;Hard of hearing  (saleh catheter)   General   Family/Caregiver Present No   Cognition   Overall Cognitive Status Impaired   Arousal/Participation Cooperative   Orientation Level Oriented to person;Oriented to place; Disoriented to time;Disoriented to situation   Memory Decreased recall of recent events;Decreased short term memory;Decreased recall of precautions;Decreased long term memory   Following Commands Follows one step commands with increased time or repetition   Comments Pt identified by name and   RLE Assessment   RLE Assessment X   Strength RLE   RLE Overall Strength 3+/5  (grossly)   LLE Assessment   LLE Assessment X   Strength LLE   LLE Overall Strength 3+/5  (grossly)   Bed Mobility   Supine to Sit 4  Minimal assistance   Additional items Assist x 1;HOB elevated; Bedrails; Increased time required;Verbal cues;LE management; Impulsive   Sit to Supine Unable to assess  (left OOB in chair post session with alarm intact)   Transfers   Sit to Stand 3  Moderate assistance   Additional items Assist x 1; Increased time required; Impulsive;Verbal cues   Stand to Sit 4  Minimal assistance   Additional items Assist x 1; Increased time required; Impulsive;Verbal cues  (hand placement)   Stand pivot 4  Minimal assistance   Additional items Assist x 1; Increased time required;Verbal cues  (with RW)   Ambulation/Elevation   Gait pattern Improper Weight shift; Forward Flexion;Decreased foot clearance;Shuffling; Short stride; Excessively slow   Gait Assistance 4  Minimal assist   Additional items Assist x 1;Verbal cues   Assistive Device Rolling walker   Distance 4` x1 and 50` x1   Balance   Static Sitting Fair +   Dynamic Sitting Fair -   Static Standing Fair -   Dynamic Standing Poor +   Ambulatory Poor +   Endurance Deficit   Endurance Deficit Yes   Endurance Deficit Description limited ambulation distance, reports fatigue   Activity Tolerance   Activity Tolerance Patient limited by fatigue   Nurse Made Aware Per RN, pt appropriate to evaluate  (updated PCA of status)   Assessment   Prognosis Fair   Problem List Decreased strength;Decreased endurance; Impaired balance;Decreased mobility; Decreased cognition; Impaired judgement;Decreased safety awareness; Impaired hearing   Goals   Patient Goals Pt reports he would like to go home  STG Expiration Date 12/15/18   Short Term Goal #1 Pt will be able to: (1) perform bed mobility with SBA (2) perform sit to stand with SBA (3) ambulate at least 300` with SBA and use of RW (4) initiate HEP (5) increase standing balance by 1 grade   Treatment Day 1   Plan   Treatment/Interventions Functional transfer training;LE strengthening/ROM; Therapeutic exercise; Endurance training;Patient/family training;Equipment eval/education; Bed mobility;Gait training;Cognitive reorientation   PT Frequency (3-5x/week)   Recommendation   Recommendation Post acute IP rehab  (return to rehab)   Equipment Recommended Walker   Barthel Index   Feeding 5   Bathing 0   Grooming Score 5   Dressing Score 5   Bladder Score 0   Bowels Score 10   Toilet Use Score 5   Transfers (Bed/Chair) Score 10   Mobility (Level Surface) Score 0   Stairs Score 0   Barthel Index Score 40       Assessment: Pt is a 80 y o  male seen for PT evaluation s/p admit to [facility] on 12/4/2018 w/ Gram-positive bacteremia  Order placed for PT  Comorbidities affecting pt's physical performance at time of assessment listed above  Personal factors affecting pt at time of IE include: limited home support, advanced age, behavioral pattern, inability to perform IADLs, inability to perform ADLs and limited insight into impairments  Prior to admission, pt was was independent w/ all functional mobility w/ RW, lived in one floor environment, lived with spouse and has a ramped entrance  Per chart, pt was relatively independent with RW and used W/C for long distances only  Upon evaluation: Pt requires min A for bed mobility, mod A for sit to stand, and min A for ambulation with RW  Forward flexed posture and poor RW proximity noted  Decreased safety awareness especially during transfers  (Please find full objective findings from PT assessment regarding body systems outlined above)  Impairments and limitations also listed above, especially due to  weakness, impaired balance, decreased endurance, gait deviations, decreased activity tolerance, decreased safety awareness, impaired judgement, fall risk and decreased cognition  The following objective measures performed on IE also reveal limitations: Barthel Index 40/100  Pt's clinical presentation is currently unstable/unpredictable seen in pt's presentation of decreased safety awareness, mild impulsivity, cognitive status, and fall risk  Pt to benefit from continued skilled PT tx while in hospital and upon DC to address deficits as defined above and maximize level of functional mobility   From PT/mobility standpoint, recommendation at time of d/c would be inpatient rehab pending progress in order to maximize pt's functional independence and consistency w/ mobility in order to facilitate return to PLOF  Recommend progression of ambulation and initiation of HEP  Stephen Vieira PT,DPT    Physical Therapy Treatment:    Time In: 7:46  Time Out: 8:01  Total Time: 15 minutes  Pt agrees to PT treatment following evaluation  Able to ambulate an additional 10` x1 and perform seated and standing LE exercises  Seated rest breaks required  Pt was able to perform (1x10) standing heel raises, (1x20) standing marches, (1x10) LAQ, and (1x10) seated adduction with pillow between knees  Pt is cooperative throughout session and is grateful to therapist for mobilization  Will continue to benefit from ongoing skilled PT to maximize his functional mobility and increase his level of independence  Recommending rehab at time of discharge when medically appropriate     Stephen Vieira PT,DPT

## 2018-12-06 NOTE — PROGRESS NOTES
Progress Note - Infectious Disease   Suzanne Matute 80 y o  male MRN: [id number]  Unit/Bed#: -01 Encounter: [id number]      Impression/Plan:  1  Gram-positive bacteremia  Blood cultures drawn at [facility] on 12/03/2018 preliminarily showed gram-positive cocci in clusters in both sets  Awaiting final culture results  Admission blood cultures are negative after 24 hours  Unclear etiology  Possibly secondary to bacteriuria and trauma from patient pulling out his Ruelas  No other clear source appreciated  [facility] obtained urine cultures on 12/03/2018 which showed coag negative staphylococcus and enterococcus faecalis  His follow up admission urine culture showed enterococcus  TTE was negative  Fortunately the patient is clinically stable and non toxic  He has been afebrile since his admission and had only mild leukocytosis  Will continue patient on IV vancomycin for now while we await final culture results from [facility]   -continue IV vancomycin  -monitor CBC and BMP  -follow-up original blood culture results from [facility]  -follow-up admission blood cultures  -follow-up admission urine culture  -monitor vitals  -supportive care     2  Bacteriuria  Patient with a chronic Ruelas catheter due to BPH and urinary retention  Patient has dementia and frequently pulls out his catheter  Catheter was most recently pulled out on 12/02/2018 and was replaced  Patient developed a fever of 102° on 12/03/2018 which prompted a urinalysis and culture at [facility] which showed coag negative staph and enterococcus faecalis  Admission urine culture also showing enterococcus  Patient now also with Gram-positive bacteremia although it is unclear if this is true infection vs contaminent    Patient likely has bladder colonization given his chronic Ruelas and may have become bacteremic from the trauma of pulling out his Ruelas   -antibiotic as above  -monitor CBC and BMP  -follow-up original urine culture result from 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 but may have become bacteremic from the trauma of pulling it out  Family is very concerned about the need for ongoing saleh and would like it removed  I recommend they discuss this concern with his outpatient urologist after his discharge   -continue outpatient follow-up with patient's urologist     4  Acute kidney injury  Likely pre renal given acute infection  Creatinine is improving   -monitor BMP  -dose adjust antibiotics for renal function as needed     5  Advanced dementia  -supportive care    Discussed with Anna TORREZ PA-C  Antibiotics:  Vancomycin 2  Antibiotics 3    Subjective:  Unable to perform ROS due to patient's advanced dementia  He is interactive and states that he does not feel well but cannot pinpoint specific symptoms  Objective:  Vitals:  Temp:  [97 5 °F (36 4 °C)-98 1 °F (36 7 °C)] 97 5 °F (36 4 °C)  HR:  [65-79] 79  Resp:  [18] 18  BP: (118-140)/(58-65) 118/58  SpO2:  [95 %-96 %] 96 %  Temp (24hrs), Av 9 °F (36 6 °C), Min:97 5 °F (36 4 °C), Max:98 1 °F (36 7 °C)  Current: Temperature: 97 5 °F (36 4 °C)    Physical Exam:   General Appearance:  Awake, somewhat interactive with dementia, nontoxic, no acute distress; appearing elderly   Throat: Oropharynx moist without lesions  Lungs:   Clear to auscultation bilaterally; no wheezes, rhonchi or rales; respirations unlabored   Heart:  RRR; no murmur, rub or gallop   Genitourinary: Fully intact, urine output is pink   Abdomen:   Soft, non-tender, non-distended, positive bowel sounds  Extremities: No clubbing or cyanosis, no edema   Skin: No new rashes or lesions  No draining wounds noted  Labs, Imaging, & Other studies:   All pertinent labs and imaging studies were personally reviewed    Results from last 7 days  Lab Units 12/06/18  0818 12/05/18  0614 12/04/18  1130   WBC Thousand/uL 8 80 9 00 11 60*   HEMOGLOBIN g/dL 13 4 13 3 13 4   PLATELETS Thousands/uL 191 172 173       Results from last 7 days  Lab Units 12/06/18  0818  12/04/18  1130   POTASSIUM mmol/L 4 4  < > 4 6   CHLORIDE mmol/L 104  < > 101   CO2 mmol/L 23  < > 25   BUN mg/dL 31*  < > 52*   CREATININE mg/dL 1 29  < > 1 81*   EGFR ml/min/1 73sq m 48  < > 32   CALCIUM mg/dL 8 3  < > 8 3   AST U/L  --   --  30   ALT U/L  --   --  21   ALK PHOS U/L  --   --  206*   < > = values in this interval not displayed  Results from last 7 days  Lab Units 12/04/18  1254 12/04/18  1153 12/04/18  1130   BLOOD CULTURE   --  No Growth at 24 hrs  No Growth at 24 hrs     URINE CULTURE  20,000-29,000 cfu/ml Enterococcus species*  20,000-29,000 cfu/ml Diphtheroids  --   --

## 2018-12-06 NOTE — ASSESSMENT & PLAN NOTE
· Records reviewed from [facility]  Blood cultures collected 12/3 are both positive for gram-positive cocci in clusters-- staph species/enterococcus    Was given 2 doses of Cipro at [facility]  · Changed to IV vanco per ID  · Follow-up cultures here-- so far repeat cx negative @ 24 hours  · ID following-- appreciate their input

## 2018-12-06 NOTE — PLAN OF CARE
Problem: PHYSICAL THERAPY ADULT  Goal: Performs mobility at highest level of function for planned discharge setting  See evaluation for individualized goals  Treatment/Interventions: Functional transfer training, LE strengthening/ROM, Therapeutic exercise, Endurance training, Patient/family training, Equipment eval/education, Bed mobility, Gait training, Cognitive reorientation  Equipment Recommended: [place]       See flowsheet documentation for full assessment, interventions and recommendations  Outcome: Progressing  Prognosis: Fair  Problem List: Decreased strength, Decreased endurance, Impaired balance, Decreased mobility, Decreased cognition, Impaired judgement, Decreased safety awareness, Impaired hearing  Assessment: Pt is a 80 y o  male seen for PT evaluation s/p admit to [facility] on 12/4/2018 w/ Gram-positive bacteremia  Order placed for PT  Comorbidities affecting pt's physical performance at time of assessment listed above  Personal factors affecting pt at time of IE include: limited home support, advanced age, behavioral pattern, inability to perform IADLs, inability to perform ADLs and limited insight into impairments  Prior to admission, pt was was independent w/ all functional mobility w/ RW, lived in one floor environment, lived with spouse and has a ramped entrance  Per chart, pt was relatively independent with RW and used W/C for long distances only  Upon evaluation: Pt requires min A for bed mobility, mod A for sit to stand, and min A for ambulation with RW  Forward flexed posture and poor RW proximity noted  Decreased safety awareness especially during transfers  (Please find full objective findings from PT assessment regarding body systems outlined above)   Impairments and limitations also listed above, especially due to  weakness, impaired balance, decreased endurance, gait deviations, decreased activity tolerance, decreased safety awareness, impaired judgement, fall risk and decreased cognition  The following objective measures performed on IE also reveal limitations: Barthel Index 40/100  Pt's clinical presentation is currently unstable/unpredictable seen in pt's presentation of decreased safety awareness, mild impulsivity, cognitive status, and fall risk  Pt to benefit from continued skilled PT tx while in hospital and upon DC to address deficits as defined above and maximize level of functional mobility  From PT/mobility standpoint, recommendation at time of d/c would be inpatient rehab pending progress in order to maximize pt's functional independence and consistency w/ mobility in order to facilitate return to PLOF  Recommend progression of ambulation and initiation of HEP  Recommendation: Post acute IP rehab (return to rehab)          See flowsheet documentation for full assessment

## 2018-12-06 NOTE — PLAN OF CARE
DISCHARGE PLANNING     Discharge to home or other facility with appropriate resources Progressing        DISCHARGE PLANNING - CARE MANAGEMENT     Discharge to post-acute care or home with appropriate resources Progressing        INFECTION - ADULT     Absence or prevention of progression during hospitalization Progressing        Knowledge Deficit     Patient/family/caregiver demonstrates understanding of disease process, treatment plan, medications, and discharge instructions Progressing        PAIN - ADULT     Verbalizes/displays adequate comfort level or baseline comfort level Progressing        Potential for Falls     Patient will remain free of falls Progressing        SAFETY ADULT     Maintain or return to baseline ADL function Progressing     Maintain or return mobility status to optimal level Progressing     Patient will remain free of falls Progressing

## 2018-12-07 VITALS
DIASTOLIC BLOOD PRESSURE: 74 MMHG | WEIGHT: 205.47 LBS | RESPIRATION RATE: 18 BRPM | SYSTOLIC BLOOD PRESSURE: 161 MMHG | HEART RATE: 72 BPM | TEMPERATURE: 98.3 F | OXYGEN SATURATION: 96 % | BODY MASS INDEX: 25.68 KG/M2

## 2018-12-07 PROBLEM — R50.9 FEVER: Status: RESOLVED | Noted: 2018-12-04 | Resolved: 2018-12-07

## 2018-12-07 LAB
ANION GAP SERPL CALCULATED.3IONS-SCNC: 12 MMOL/L (ref 4–13)
BUN SERPL-MCNC: 21 MG/DL (ref 5–25)
CALCIUM SERPL-MCNC: 8.1 MG/DL (ref 8.3–10.1)
CHLORIDE SERPL-SCNC: 108 MMOL/L (ref 100–108)
CO2 SERPL-SCNC: 23 MMOL/L (ref 21–32)
CREAT SERPL-MCNC: 1.14 MG/DL (ref 0.6–1.3)
ERYTHROCYTE [DISTWIDTH] IN BLOOD BY AUTOMATED COUNT: 12.1 % (ref 11.6–15.1)
GFR SERPL CREATININE-BSD FRML MDRD: 56 ML/MIN/1.73SQ M
GLUCOSE SERPL-MCNC: 103 MG/DL (ref 65–140)
HCT VFR BLD AUTO: 36.9 % (ref 36.5–49.3)
HGB BLD-MCNC: 12.3 G/DL (ref 12–17)
MCH RBC QN AUTO: 30.6 PG (ref 26.8–34.3)
MCHC RBC AUTO-ENTMCNC: 33.3 G/DL (ref 31.4–37.4)
MCV RBC AUTO: 92 FL (ref 82–98)
PLATELET # BLD AUTO: 190 THOUSANDS/UL (ref 149–390)
PMV BLD AUTO: 10.5 FL (ref 8.9–12.7)
POTASSIUM SERPL-SCNC: 4.4 MMOL/L (ref 3.5–5.3)
RBC # BLD AUTO: 4.02 MILLION/UL (ref 3.88–5.62)
SODIUM SERPL-SCNC: 143 MMOL/L (ref 136–145)
WBC # BLD AUTO: 7.55 THOUSAND/UL (ref 4.31–10.16)

## 2018-12-07 PROCEDURE — 97530 THERAPEUTIC ACTIVITIES: CPT

## 2018-12-07 PROCEDURE — 80048 BASIC METABOLIC PNL TOTAL CA: CPT | Performed by: PHYSICIAN ASSISTANT

## 2018-12-07 PROCEDURE — 85027 COMPLETE CBC AUTOMATED: CPT | Performed by: PHYSICIAN ASSISTANT

## 2018-12-07 PROCEDURE — 99239 HOSP IP/OBS DSCHRG MGMT >30: CPT | Performed by: PHYSICIAN ASSISTANT

## 2018-12-07 PROCEDURE — 97116 GAIT TRAINING THERAPY: CPT

## 2018-12-07 PROCEDURE — 99233 SBSQ HOSP IP/OBS HIGH 50: CPT | Performed by: INTERNAL MEDICINE

## 2018-12-07 RX ORDER — HYDROCODONE BITARTRATE AND ACETAMINOPHEN 5; 300 MG/1; MG/1
1 TABLET ORAL EVERY 6 HOURS PRN
Qty: 2 TABLET | Refills: 0 | Status: SHIPPED | OUTPATIENT
Start: 2018-12-07 | End: 2018-12-17

## 2018-12-07 RX ORDER — OXYBUTYNIN CHLORIDE 5 MG/1
5 TABLET, EXTENDED RELEASE ORAL DAILY
Qty: 30 TABLET | Refills: 0 | Status: SHIPPED | OUTPATIENT
Start: 2018-12-07

## 2018-12-07 RX ORDER — OXYBUTYNIN CHLORIDE 5 MG/1
5 TABLET, EXTENDED RELEASE ORAL DAILY
Status: DISCONTINUED | OUTPATIENT
Start: 2018-12-07 | End: 2018-12-07 | Stop reason: HOSPADM

## 2018-12-07 RX ORDER — ALPRAZOLAM 0.25 MG/1
0.25 TABLET ORAL 3 TIMES DAILY PRN
Qty: 2 TABLET | Refills: 0 | Status: SHIPPED | OUTPATIENT
Start: 2018-12-07 | End: 2018-12-17

## 2018-12-07 RX ADMIN — AMLODIPINE BESYLATE 10 MG: 10 TABLET ORAL at 09:54

## 2018-12-07 RX ADMIN — GABAPENTIN 300 MG: 300 CAPSULE ORAL at 16:08

## 2018-12-07 RX ADMIN — CYANOCOBALAMIN TAB 500 MCG 500 MCG: 500 TAB at 09:54

## 2018-12-07 RX ADMIN — SODIUM CHLORIDE 100 ML/HR: 0.9 INJECTION, SOLUTION INTRAVENOUS at 02:15

## 2018-12-07 RX ADMIN — HEPARIN SODIUM 5000 UNITS: 5000 INJECTION, SOLUTION INTRAVENOUS; SUBCUTANEOUS at 06:45

## 2018-12-07 RX ADMIN — QUETIAPINE FUMARATE 12.5 MG: 25 TABLET ORAL at 09:56

## 2018-12-07 RX ADMIN — SODIUM CHLORIDE TAB 1 GM 2 G: 1 TAB at 09:54

## 2018-12-07 RX ADMIN — FINASTERIDE 5 MG: 5 TABLET, FILM COATED ORAL at 09:54

## 2018-12-07 RX ADMIN — TAMSULOSIN HYDROCHLORIDE 0.4 MG: 0.4 CAPSULE ORAL at 16:08

## 2018-12-07 RX ADMIN — SODIUM CHLORIDE 100 ML/HR: 0.9 INJECTION, SOLUTION INTRAVENOUS at 11:28

## 2018-12-07 RX ADMIN — VANCOMYCIN HYDROCHLORIDE 1750 MG: 1 INJECTION, POWDER, LYOPHILIZED, FOR SOLUTION INTRAVENOUS at 16:09

## 2018-12-07 RX ADMIN — ALPRAZOLAM 0.25 MG: 0.25 TABLET ORAL at 09:52

## 2018-12-07 RX ADMIN — OXYBUTYNIN CHLORIDE 5 MG: 5 TABLET, EXTENDED RELEASE ORAL at 16:08

## 2018-12-07 RX ADMIN — GABAPENTIN 300 MG: 300 CAPSULE ORAL at 09:54

## 2018-12-07 NOTE — PROGRESS NOTES
Vancomycin IV Pharmacy-to-Dose Consultation    Cayden Augustin is a 80 y o  male who is currently receiving Vancomycin IV with management by the Pharmacy Consult service  Assessment/Plan:  The patient was reviewed  Renal function is stable and no signs or symptoms of nephrotoxicity and/or infusion reactions were documented in the chart  Based on todays assessment, continue current vancomycin (day # 3) dosing of 1750 mg q 24 h, with a plan for trough to be drawn at 1630 on 12/8    We will continue to follow the patients culture results and clinical progress daily      Adalgisa Mcconnell, Pharmacist

## 2018-12-07 NOTE — PROGRESS NOTES
Progress Note - Infectious Disease   Suzanne Matute 80 y o  male MRN: [id number]  Unit/Bed#: -01 Encounter: [id number]      Impression/Plan:  1  Staph schleiferi bacteremia  Now showing in both sets of blood cultures drawn at [facility] on 12/03/2018  Admission blood cultures are negative after 48 hours  Unclear etiology  Possibly secondary to bacteriuria and trauma from patient pulling out his Ruelas   No other clear source appreciated  Nataly Debora urine cultures on 12/03/2018 which showed coag negative staphylococcus and enterococcus faecalis  His follow up admission urine culture showed enterococcus  TTE was negative  Fortunately the patient is clinically stable and non toxic  He has been afebrile since his admission and had only mild leukocytosis which is now resolved  Will continue patient on IV vancomycin for a 7 day total  treatment course   -continue IV vancomycin through 12/11/18 for a 7-day treatment course  -monitor CBC and BMP  -follow-up admission blood cultures  -monitor vitals  -supportive care     2  Bacteriuria   Patient with a chronic Ruelas catheter due to BPH and urinary retention  Patient has dementia and frequently pulls out his [place] Setting was most recently pulled out on 12/02/2018 and was replaced  Guillaume Juan Miguel developed a fever of 102° on 12/03/2018 which prompted a urinalysis and culture at [facility] showed coag negative staph and enterococcus faecalis  Admission urine culture also showing enterococcus  Patient likely has bladder colonization given his chronic Ruelas and may have become bacteremic from the trauma of pulling out his Ruelas   -antibiotic as above  -monitor CBC and BMP  -monitor vitals  -monitor urine output     3  Urinary retention with chronic Ruelas   Patient with BPH and chronic urinary retention   He has dementia and frequently pulls out his catheter   Recent urinalysis showed large blood which I suspect is related to patient pulling out his Saleh on 2018  Lynnann Dakin 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 related to his Saleh but may have become bacteremic from the trauma of pulling it out  Family is very concerned about the need for ongoing saleh and would like it removed  I recommend they discuss this concern with his outpatient urologist after his discharge   -continue outpatient follow-up with patient's urologist     4  Acute kidney injury   Likely pre renal given acute infection  Creatinine is improving   -monitor BMP  -dose adjust antibiotics for renal function as needed     5  Advanced dementia  -supportive care    Discussed in detail with LORE GUZMAN, Jeevan Hardy  Antibiotics:  Vancomycin 3  Antibiotics 3    Subjective:  Unable to perform full ROS due to patient's dementia  He tells me he is not feeling well today but cannot pinpoint specific symptoms  Objective:  Vitals:  Temp:  [97 5 °F (36 4 °C)-98 3 °F (36 8 °C)] 98 3 °F (36 8 °C)  HR:  [65-76] 72  Resp:  [18] 18  BP: (107-163)/(54-74) 161/74  SpO2:  [96 %-98 %] 96 %  Temp (24hrs), Av 8 °F (36 6 °C), Min:97 5 °F (36 4 °C), Max:98 3 °F (36 8 °C)  Current: Temperature: 98 3 °F (36 8 °C)    Physical Exam:   General Appearance:  Awake, somewhat interactive with dementia, nontoxic, no acute distress; appears elderly   Throat: Oropharynx moist without lesions  Lungs:   Clear to auscultation bilaterally; no wheezes, rhonchi or rales; respirations unlabored   Heart:  RRR; no murmur, rub or gallop   Abdomen:   Soft, non-tender, non-distended, positive bowel sounds  Genitourinary: Intact Saleh catheter   Extremities: No clubbing or cyanosis, no edema   Skin: No new rashes or lesions  No draining wounds noted       Labs, Imaging, & Other studies:   All pertinent labs and imaging studies were personally reviewed    Results from last 7 days  Lab Units 18  0831 18  0818 18  0614   WBC Thousand/uL 7 55 8 80 9 00   HEMOGLOBIN g/dL 12 3 13 4 13 3 PLATELETS Thousands/uL 190 191 172       Results from last 7 days  Lab Units 12/07/18  0831  12/04/18  1130   POTASSIUM mmol/L 4 4  < > 4 6   CHLORIDE mmol/L 108  < > 101   CO2 mmol/L 23  < > 25   BUN mg/dL 21  < > 52*   CREATININE mg/dL 1 14  < > 1 81*   EGFR ml/min/1 73sq m 56  < > 32   CALCIUM mg/dL 8 1*  < > 8 3   AST U/L  --   --  30   ALT U/L  --   --  21   ALK PHOS U/L  --   --  206*   < > = values in this interval not displayed  Results from last 7 days  Lab Units 12/04/18  1254 12/04/18  1153 12/04/18  1130   BLOOD CULTURE   --  No Growth at 48 hrs  No Growth at 48 hrs     URINE CULTURE  20,000-29,000 cfu/ml Enterococcus faecalis*  20,000-29,000 cfu/ml Diphtheroids  --   --

## 2018-12-07 NOTE — ASSESSMENT & PLAN NOTE
· Patient at baseline  As per family Seroquel has been helping a lot  Still pulls at Saleh     · Will be discharged to [facility]   · Continue Seroquel   · Xanax PRN  · Monitor saleh as patient pulls at this frequently

## 2018-12-07 NOTE — DISCHARGE SUMMARY
[facility]  Discharge- Karrie Ada 8/14/1926, 80 y o  male MRN: [id number]    Unit/Bed#: -[facility] Encounter: [id number]    Primary Care Provider: Cesia Zhong DO   Date and time admitted to hospital: 12/4/2018 11:14 AM        * Gram-positive bacteremia   Assessment & Plan    · Records [place] from [facility] [place]  Blood cultures collected 12/3 are both positive for staph schleiferi  Was given 2 doses of Cipro at [facility]  Blood cultures here are negative at 48 hours   · Changed to IV vanco per ID and will remain on Vanco for 4 more days   · Appreciate ID recommendations   · Stable for discharge      Fever-resolved as of 12/7/2018   Assessment & Plan    · Noted to have fever at [facility] past 2 days with Tmax 102  Afebrile x72 hours  · Patient had urine cx and blood cx taken yesterday  Received fax from [facility] with results  · Urine cx from [facility]: >100,000 cfu/mL staph species and >100,000 cfu/mL strep species  · Final UC from [facility] growing both staph and enterococcus   · Blood cx x2: gram + cocci in clusters  · Final BC results from [facility] showing Staph schleiferi   · Discussed with ID-- BC drawn here negative @ 48 hours; UC here + for enterococcus-- concern for contaminant vs  True bacteremia-- currently on vanco and he will remain on Vanco for 4 more days to complete treatment      Urinary retention   Assessment & Plan    · Ongoing issue  Has Saleh catheter  Would maintain and continue outpatient follow-up with Urology  · Maintain Proscar, Flomax  · Will trial Oxybutynin to alleviate any possible bladder spasm that may be making patient pull at saleh      Dementia   Assessment & Plan    · Patient at baseline  As per family Seroquel has been helping a lot  Still pulls at [facility]     · Will be discharged to [facility]   · Continue Seroquel   · Xanax PRN  · Monitor saleh as patient pulls at this frequently       Discharging Physician / Practitioner: Rahul Huber PA-C  PCP: Alejandrina Ruiz DO  Admission Date:   Admission Orders     Ordered        12/05/18 1201  Inpatient Admission  Once         12/04/18 1252  Place in Observation (expected length of stay for this patient is less than two midnights)  Once             Discharge Date: 12/07/18    Disposition:      Short Term Rehab or SNF at [facility]    For Discharges to [facility] SNF:   · Not Applicable to this Patient - Not Applicable to this Patient    Reason for Admission: Gram-positive bacteremia     Discharge Diagnoses:     Please see assessment and plan section above for further details regarding discharge diagnoses  Resolved Problems  Date Reviewed: 12/7/2018          Resolved    Acute-on-chronic kidney injury ([facility] [address] ) 12/6/2018     Resolved by  Nelly Lovett PA-C    Fever 12/7/2018     Resolved by  Olena Bryant PA-C          Consultations During Hospital Stay:  · Infectious Disease - Dr Elva Ruiz     Procedures Performed:     · CXR PA and Lateral     Medication Adjustments and Discharge Medications:  · Summary of Medication Adjustments made as a result of this hospitalization: Vancomycin for 4 more days through peripheral IV  Oxybutynin added for Ruelas discomfort - may be discontinued if no benefit   · Medication Dosing Tapers - Please refer to Discharge Medication List for details on any medication dosing tapers (if applicable to patient)  · Medications being temporarily held (include recommended restart time): None  · Discharge Medication List: See after visit summary for reconciled discharge medications  Wound Care Recommendations:  When applicable, please see wound care section of After Visit Summary      Diet Recommendations at Discharge:  Diet -        Diet Orders            Start     Ordered    12/04/18 1503  Room Service  Once     Question:  Type of Service  Answer:  Room Service - Appropriate with Assistance    12/04/18 1502    12/04/18 1441  Diet Regular; Regular House  (ED Bridging Orders Panel) Diet effective now     Question Answer Comment   Diet Type Regular    Regular Regular House    RD to adjust diet per protocol? Yes        12/04/18 1440          Instructions for any Catheters / Lines Present at Discharge (including removal date, if applicable): Continue to maintain saleh catheter     Significant Findings / Test Results:     XR chest 2 views   Final Result by Jaime Vásquez DO (12/04 1255)   Diminished inspiration  Bibasilar atelectasis  Workstation performed: [id number]           · As above     Incidental Findings:   · None     Test Results Pending at Discharge (will require follow up): · None     Outpatient Tests Requested:  · None    Complications:  None    Hospital Course: Edgard Dhaliwal is a 80 y o  male patient who originally presented to the hospital on 12/4/2018 due to gram positive bacteremia  [facility] and blood cultures were drawn there  He had received 2 doses of Cipro prior to presentation  He was admitted and started on IV ancef initially and an infectious disease consultation was requested  Repeat blood cultures were drawn  Initially the blood cultures from [facility] were thought to possibly be a contaminant versus related to patient's indwelling saleh and traumatic removal and replacement  On evaluation infectious disease changed the patient to IV vancomycin for the time being  The patient remained afebrile during his stay here and his cultures here were negative at 48 hours  The patients blood cultures from [facility] came back 2/2 Staph schleiferi so it was treated as a true bacteremia with a total of 7 days of IV vancomycin   He was discharged to [facility] SNF as set up by case management     Condition at Discharge: stable     Discharge Day Visit / Exam:     Subjective:  Patient demented and unable to provide usable history   Vitals: Blood Pressure: 161/74 (12/07/18 0717)  Pulse: 72 (12/07/18 0717)  Temperature: 98 3 °F (36 8 °C) (12/07/18 9265)  Temp Source: Oral (12/07/18 0717)  Respirations: 18 (12/07/18 0717)  Weight - Scale: 93 2 kg (205 lb 7 5 oz) (12/04/18 1119)  SpO2: 96 % (12/07/18 0717)  Exam:   Physical Exam   Constitutional: Vital signs are normal  He appears well-developed and well-nourished  Non-toxic appearance  No distress  HENT:   Head: Normocephalic and atraumatic  Mouth/Throat: Mucous membranes are not dry  Eyes: Pupils are equal, round, and reactive to light  Conjunctivae and EOM are normal    Neck: Neck supple  Cardiovascular: Normal rate, regular rhythm, S1 normal, S2 normal, normal heart sounds and intact distal pulses  Exam reveals no S3 and no S4  No murmur heard  Pulmonary/Chest: Effort normal and breath sounds normal  No accessory muscle usage  No respiratory distress  He has no decreased breath sounds  He has no wheezes  He has no rhonchi  He has no rales  He exhibits no tenderness  Abdominal: Soft  Bowel sounds are normal  He exhibits no distension and no mass  There is no tenderness  There is no rigidity, no rebound and no guarding  Neurological: He is alert  Skin: Skin is warm and dry  Discussion with Family: Discussed with family at bedside     Goals of Care Discussions:  · Code Status at Discharge: Level 3 - DNAR and DNI  · Were there any Goals of Care Discussions during Hospitalization?: No  · Results of any General Goals of Care Discussions: N/A   · POLST Completed: No   · If POLST Completed, Summary of POLST Agreement Provided Here: N/A   · OK to Rehospitalize if Needed? Yes    Discharge instructions/Information to patient and family:   See after visit summary section titled Discharge Instructions for information provided to patient and family  Planned Readmission: None      Discharge Statement:  I spent 45 minutes discharging the patient  This time was spent on the day of discharge  I had direct contact with the patient on the day of discharge   Greater than 50% of the total time was spent examining patient, answering all patient questions, arranging and discussing plan of care with patient as well as directly providing post-discharge instructions  Additional time then spent on discharge activities      ** Please Note: This note has been constructed using a voice recognition system **

## 2018-12-07 NOTE — PLAN OF CARE
Problem: DISCHARGE PLANNING - CARE MANAGEMENT  Goal: Discharge to post-acute care or home with appropriate resources  INTERVENTIONS:  - Conduct assessment to determine patient/family and health care team treatment goals, and need for post-acute services based on payer coverage, community resources, and patient preferences, and barriers to discharge  - Address psychosocial, clinical, and financial barriers to discharge as identified in assessment in conjunction with the patient/family and health care team  - Arrange appropriate level of post-acute services according to patient's   needs and preference and payer coverage in collaboration with the physician and health care team  - Communicate with and update the patient/family, physician, and health care team regarding progress on the discharge plan  - Arrange appropriate transportation to post-acute venues   Outcome: Completed Date Met: 12/07/18  Per ID, Pt will need 4 more days of Vanco  CM spoke to [facility], Ismael can give medication through peripheral  Pt is cleared for discharge today  Ismael will not have Pt's dose of Vanco for today  Pt will get Vanco at 4pm and can discharge after it is completed  CM arranged BLS via [facility], per [facility], [place] will  Pt at 6:30pm  CM made MEGAN Domínguez, nurse Akiko Maza, Pt's daughter and Pt's spouse aware

## 2018-12-07 NOTE — PLAN OF CARE
Problem: PHYSICAL THERAPY ADULT  Goal: Performs mobility at highest level of function for planned discharge setting  See evaluation for individualized goals  Treatment/Interventions: Functional transfer training, LE strengthening/ROM, Therapeutic exercise, Endurance training, Patient/family training, Equipment eval/education, Bed mobility, Gait training, Cognitive reorientation  Equipment Recommended: Gato Ojeda       See flowsheet documentation for full assessment, interventions and recommendations  Outcome: Progressing  Prognosis: Fair  Problem List: Decreased strength, Decreased range of motion, Decreased endurance, Impaired balance, Decreased mobility, Decreased coordination, Decreased cognition, Impaired hearing, Decreased safety awareness, Impaired judgement  Assessment: Pt  is a 79 yo M who presents with gram-postive bactermia  PMH includes significant dementia  Pt  identified with full name and birthdate  Pt  transfers with Patricia and VCs for sequencing and hand placement with impulsivity to return to seating requiring Patricia for controlled descent to chair  Pt  Ambulated 60'x1 with RW and ModA x1 with VCs for sequencing, gait mechanics, hand placement, and posture  Pt  Stood at counter with RW  And Patricia with VCs for posture and sequencing, Pt demonstrated significant kyphosis in static standing  Pt  Elected to return to recliner and was positioned with all needs within reach and Spouse at bedside  Pt  Required consistent VCs for throughout session for appropriate mobility with little carryover noted  Pt  Will benefit from continued skilled therapy to address functional mobility for increased independence and decreased burden of care  Discharge recommendation is post-acute inpatient rehab for continued skilled PT  Recommendation: Post acute IP rehab          See flowsheet documentation for full assessment

## 2018-12-07 NOTE — ASSESSMENT & PLAN NOTE
· Records reviewed from [facility]  Blood cultures collected 12/3 are both positive for staph schleiferi  Was given 2 doses of Cipro at [facility]   Blood cultures here are negative at 48 hours   · Changed to IV vanco per ID and will remain on Vanco for 4 more days   · Appreciate ID recommendations   · Stable for discharge

## 2018-12-07 NOTE — PLAN OF CARE
Problem: Potential for Falls  Goal: Patient will remain free of falls  INTERVENTIONS:  - Assess patient frequently for physical needs  -  Identify cognitive and physical deficits and behaviors that affect risk of falls    -  [place] fall precautions as indicated by assessment   - Educate patient/family on patient safety including physical limitations  - Instruct patient to call for assistance with activity based on assessment  - Modify environment to reduce risk of injury  - Consider OT/PT consult to assist with strengthening/mobility    Outcome: Progressing      Problem: PAIN - ADULT  Goal: Verbalizes/displays adequate comfort level or baseline comfort level  Interventions:  - Encourage patient to monitor pain and request assistance  - Assess pain using appropriate pain scale  - Administer analgesics based on type and severity of pain and evaluate response  - Implement non-pharmacological measures as appropriate and evaluate response  - Consider cultural and social influences on pain and pain management  - Notify physician/advanced practitioner if interventions unsuccessful or patient reports new pain   Outcome: Progressing      Problem: INFECTION - ADULT  Goal: Absence or prevention of progression during hospitalization  INTERVENTIONS:  - Assess and monitor for signs and symptoms of infection  - Monitor lab/diagnostic results  - Monitor all insertion sites, i e  indwelling lines, tubes, and drains  - Monitor endotracheal (as able) and nasal secretions for changes in amount and color  - [place] appropriate cooling/warming therapies per order  - Administer medications as ordered  - Instruct and encourage patient and family to use good hand hygiene technique  - Identify and instruct in appropriate isolation precautions for identified infection/condition    Outcome: Progressing      Problem: SAFETY ADULT  Goal: Maintain or return to baseline ADL function  INTERVENTIONS:  -  Assess patient's ability to carry out ADLs; assess patient's baseline for ADL function and identify physical deficits which impact ability to perform ADLs (bathing, care of mouth/teeth, toileting, grooming, dressing, etc )  - Assess/evaluate cause of self-care deficits   - Assess range of motion  - Assess patient's mobility; develop plan if impaired  - Assess patient's need for assistive devices and provide as appropriate  - Encourage maximum independence but intervene and supervise when necessary  ¯ Involve family in performance of ADLs  ¯ Assess for home care needs following discharge   ¯ Request OT consult to assist with ADL evaluation and planning for discharge  ¯ Provide patient education as appropriate    Outcome: Progressing    Goal: Maintain or return mobility status to optimal level  INTERVENTIONS:  - Assess patient's baseline mobility status (ambulation, transfers, stairs, etc )    - Identify cognitive and physical deficits and behaviors that affect mobility  - Identify mobility aids required to assist with transfers and/or ambulation (gait belt, sit-to-stand, lift, walker, cane, etc )  - [place] fall precautions as indicated by assessment  - Record patient progress and toleration of activity level on Mobility SBAR; progress patient to next Phase/Stage  - Instruct patient to call for assistance with activity based on assessment  - Request Rehabilitation consult to assist with strengthening/weightbearing, etc     Outcome: Progressing    Goal: Patient will remain free of falls  INTERVENTIONS:  - Assess patient frequently for physical needs  -  Identify cognitive and physical deficits and behaviors that affect risk of falls    -  [place] fall precautions as indicated by assessment   - Educate patient/family on patient safety including physical limitations  - Instruct patient to call for assistance with activity based on assessment  - Modify environment to reduce risk of injury  - Consider OT/PT consult to assist with strengthening/mobility Outcome: Progressing      Problem: DISCHARGE PLANNING  Goal: Discharge to home or other facility with appropriate resources  INTERVENTIONS:  - Identify barriers to discharge w/patient and caregiver  - Arrange for needed discharge resources and transportation as appropriate  - Identify discharge learning needs (meds, wound care, etc )  - Arrange for interpretive services to assist at discharge as needed  - Refer to Case Management Department for coordinating discharge planning if the patient needs post-hospital services based on physician/advanced practitioner order or complex needs related to functional status, cognitive ability, or social support system    Outcome: Progressing      Problem: Knowledge Deficit  Goal: Patient/family/caregiver demonstrates understanding of disease process, treatment plan, medications, and discharge instructions  Complete learning assessment and assess knowledge base    Interventions:  - Provide teaching at level of understanding  - Provide teaching via preferred learning methods    Outcome: Progressing      Problem: DISCHARGE PLANNING - CARE MANAGEMENT  Goal: Discharge to post-acute care or home with appropriate resources  INTERVENTIONS:  - Conduct assessment to determine patient/family and health care team treatment goals, and need for post-acute services based on payer coverage, community resources, and patient preferences, and barriers to discharge  - Address psychosocial, clinical, and financial barriers to discharge as identified in assessment in conjunction with the patient/family and health care team  - Arrange appropriate level of post-acute services according to patient's   needs and preference and payer coverage in collaboration with the physician and health care team  - Communicate with and update the patient/family, physician, and health care team regarding progress on the discharge plan  - Arrange appropriate transportation to post-acute venues   Outcome: Progressing

## 2018-12-07 NOTE — ASSESSMENT & PLAN NOTE
· Ongoing issue  Has [place] catheter    Would maintain and continue outpatient follow-up with Urology  · Maintain Proscar, Flomax  · Will trial Oxybutynin to alleviate any possible bladder spasm that may be making patient pull at saleh

## 2018-12-07 NOTE — SOCIAL WORK
Per ID, Pt will need 4 more days of Vanco  CM spoke to [facility] Betty Urena can give medication through peripheral  Pt is cleared for discharge today  [facility] will not have Pt's dose of Vanco for today  Pt will get Vanco at 4pm and can discharge after it is completed  CM arranged BLS via [facility], per [facility], [place] will  Pt at 6:30pm  CM made MEGAN Domínguez, nurse Jaime Damico, Pt's daughter and Pt's spouse aware

## 2018-12-07 NOTE — ASSESSMENT & PLAN NOTE
· Noted to have fever at [facility] past 2 days with Tmax 102  Afebrile x72 hours  · Patient had urine cx and blood cx taken yesterday  Received fax from [facility] with results  · Urine cx from [facility]: >100,000 cfu/mL staph species and >100,000 cfu/mL strep species    · Final UC from [facility] growing both staph and enterococcus   · Blood cx x2: gram + cocci in clusters  · Final BC results from [facility] showing Staph schleiferi   · Discussed with ID-- BC drawn here negative @ 48 hours; UC here + for enterococcus-- concern for contaminant vs  True bacteremia-- currently on vanco and he will remain on Vanco for 4 more days to complete treatment

## 2018-12-07 NOTE — PLAN OF CARE
DISCHARGE PLANNING     Discharge to home or other facility with appropriate resources Completed        INFECTION - ADULT     Absence or prevention of progression during hospitalization Completed        Knowledge Deficit     Patient/family/caregiver demonstrates understanding of disease process, treatment plan, medications, and discharge instructions Completed        PAIN - ADULT     Verbalizes/displays adequate comfort level or baseline comfort level Completed        Potential for Falls     Patient will remain free of falls Completed        SAFETY ADULT     Maintain or return to baseline ADL function Completed     Maintain or return mobility status to optimal level Completed     Patient will remain free of falls Completed

## 2018-12-07 NOTE — SOCIAL WORK
[facility]ramer # [id number] with next review on 12/12 to Leona Gonzalez Phone: [phone] and fax: [phone]  [facility] Cramer is same number  CM provided [facility] with auth info  Pt needs ID clearance prior to discharge

## 2018-12-07 NOTE — PHYSICAL THERAPY NOTE
PHYSICAL THERAPY Treatment NOTE  Patient Name: Genie Sanches  MTNSB'X Date: 12/7/2018 12/07/18 1151   Restrictions/Precautions   Weight Bearing Precautions Per Order No   Other Precautions Impulsive; Chair Alarm; Bed Alarm;Cognitive;Multiple lines; Fall Risk   General   Chart Reviewed Yes   Additional Pertinent History Pt  is a 81 yo M who presents with gram-postive bactermia  PMH includes significant dementia  Family/Caregiver Present No   Cognition   Overall Cognitive Status Impaired   Arousal/Participation Arousable;Persistent stimuli required  (impulsive)   Attention Attends with cues to redirect   Orientation Level Oriented to person;Oriented to place; Disoriented to time;Disoriented to situation   Memory Decreased recall of recent events;Decreased recall of precautions   Following Commands Follows one step commands inconsistently   Comments Pt  identified with full name and birthdate   Bed Mobility   Additional Comments unable to assess Pt  OOB prior to PT session and elected to return to recliner following   Transfers   Sit to Stand 4  Minimal assistance   Additional items Assist x 1; Increased time required;Verbal cues  (for posture and sequencing)   Stand to Sit 4  Minimal assistance   Additional items Assist x 1; Impulsive;Verbal cues  (to reach back for controlled descent)   Additional Comments Pt  transfers with Patricia and VCs for sequencing and hand placement with impulsivity to return to seating requiring Patricia for controlled descent to chair   Ambulation/Elevation   Gait pattern Narrow SAE; Forward Flexion;Decreased foot clearance;Shuffling; Foward flexed; Redundant gait at times; Short stride; Step to;Excessively slow   Gait Assistance 3  Moderate assist   Additional items Assist x 1;Verbal cues  (for sequencing, gait mechanics, hand placement, and posture)   Assistive Device Rolling walker   Distance 60'x1    Balance Static Sitting Fair +   Dynamic Sitting Fair -   Static Standing Poor +   Dynamic Standing Poor +   Ambulatory Poor +   Endurance Deficit   Endurance Deficit Yes   Endurance Deficit Description notable postural degradation with increased ambulation   Activity Tolerance   Activity Tolerance Patient limited by fatigue;Patient limited by pain; Other (Comment)  (decreased cognition)   Medical Staff Made Aware Spoke to Darnell Barfield   Nurse Made Aware Spoke to Ousmane Davila [facility]   Assessment   Prognosis Fair   Problem List Decreased strength;Decreased range of motion;Decreased endurance; Impaired balance;Decreased mobility; Decreased coordination;Decreased cognition; Impaired hearing;Decreased safety awareness; Impaired judgement   Assessment Pt  is a 79 yo M who presents with gram-postive bactermia  PMH includes significant dementia  Pt  identified with full name and birthdate  Pt  transfers with Patricia and VCs for sequencing and hand placement with impulsivity to return to seating requiring Patricia for controlled descent to chair  Pt  Ambulated 60'x1 with RW and ModA x1 with VCs for sequencing, gait mechanics, hand placement, and posture  Pt  Stood at counter with RW  And Patricia with VCs for posture and sequencing, Pt demonstrated significant kyphosis in static standing  Pt  Elected to return to recliner and was positioned with all needs within reach and Spouse at bedside  Pt  Required consistent VCs for throughout session for appropriate mobility with little carryover noted  Pt  Will benefit from continued skilled therapy to address functional mobility for increased independence and decreased burden of care  Discharge recommendation is post-acute inpatient rehab for continued skilled PT  Goals   Patient Goals to feel better   [facility] Expiration Date 12/15/18   Treatment Day 2   Plan   Treatment/Interventions Functional transfer training;LE strengthening/ROM; Therapeutic exercise; Endurance training;Cognitive reorientation;Patient/family training;Equipment eval/education; Bed mobility;Gait training;Continued evaluation;Spoke to nursing;Spoke to case management; Family   Progress Slow progress, cognitive deficits   PT Frequency (3-5x/week)   Recommendation   Recommendation Post acute IP rehab   Equipment Recommended Robert Toledo, PT 12/7/2018

## 2018-12-07 NOTE — PLAN OF CARE
Problem: DISCHARGE PLANNING - CARE MANAGEMENT  Goal: Discharge to post-acute care or home with appropriate resources  INTERVENTIONS:  - Conduct assessment to determine patient/family and health care team treatment goals, and need for post-acute services based on payer coverage, community resources, and patient preferences, and barriers to discharge  - Address psychosocial, clinical, and financial barriers to discharge as identified in assessment in conjunction with the patient/family and health care team  - Arrange appropriate level of post-acute services according to patient's   needs and preference and payer coverage in collaboration with the physician and health care team  - Communicate with and update the patient/family, physician, and health care team regarding progress on the discharge plan  - Arrange appropriate transportation to post-acute venues   Outcome: Progressing  CM received call from [facility] for BLS and STR is approved, [facility] # [id number] with next review on 12/12 to [facility] Phone: [phone] and fax: [phone]  [facility] is same number  CM provided [place] with auth info  Pt needs ID clearance prior to discharge

## 2018-12-08 NOTE — PROGRESS NOTES
ALPRAZolam (Xanax)   25mg from 12/6/18 returned to the pharmacy on 12/8/18 at [id number], by Raul Brown RN, witnessed by Trip García RN

## 2018-12-09 LAB
BACTERIA BLD CULT: NORMAL
BACTERIA BLD CULT: NORMAL

## 2025-01-13 NOTE — SOCIAL WORK
PATIENT IS ON OBS  PATIENT IS NOT A BUNDLE  PATIENT IS NOT A READMISSION  Observation notice reviewed  Copy provided to patient  Copy placed in medical records  Patient currently is confused and baseline and patient spouse is primary care take  Patient was seen by PT and determined that he did  Very well during evaluation however, will benefit from VNA  CM spoke with patient spouse about VNA options and determined that she would prefer a referral be sent to [facility] for SN and PT  At this time, patient spouse is interested in patient remain for "few days" however, CM explained that pending medical stability, patient maybe able to discharge home later today  CM explained to spouse the attending physician will round on patient today and discuss options and discharge frame  CM will send referral for [facility] and will follow through discharge  Statement Selected